# Patient Record
Sex: FEMALE | Race: WHITE | Employment: OTHER | URBAN - METROPOLITAN AREA
[De-identification: names, ages, dates, MRNs, and addresses within clinical notes are randomized per-mention and may not be internally consistent; named-entity substitution may affect disease eponyms.]

---

## 2018-03-26 ENCOUNTER — OFFICE VISIT (OUTPATIENT)
Dept: GERIATRIC MEDICINE | Age: 83
End: 2018-03-26
Payer: COMMERCIAL

## 2018-03-26 VITALS
SYSTOLIC BLOOD PRESSURE: 130 MMHG | TEMPERATURE: 98.2 F | DIASTOLIC BLOOD PRESSURE: 74 MMHG | RESPIRATION RATE: 12 BRPM | BODY MASS INDEX: 31.54 KG/M2 | WEIGHT: 178 LBS | HEIGHT: 63 IN | HEART RATE: 68 BPM

## 2018-03-26 DIAGNOSIS — G31.84 MCI (MILD COGNITIVE IMPAIRMENT): Primary | ICD-10-CM

## 2018-03-26 PROCEDURE — 1036F TOBACCO NON-USER: CPT | Performed by: INTERNAL MEDICINE

## 2018-03-26 PROCEDURE — G8400 PT W/DXA NO RESULTS DOC: HCPCS | Performed by: INTERNAL MEDICINE

## 2018-03-26 PROCEDURE — G8427 DOCREV CUR MEDS BY ELIG CLIN: HCPCS | Performed by: INTERNAL MEDICINE

## 2018-03-26 PROCEDURE — 1090F PRES/ABSN URINE INCON ASSESS: CPT | Performed by: INTERNAL MEDICINE

## 2018-03-26 PROCEDURE — 99211 OFF/OP EST MAY X REQ PHY/QHP: CPT

## 2018-03-26 PROCEDURE — 4040F PNEUMOC VAC/ADMIN/RCVD: CPT | Performed by: INTERNAL MEDICINE

## 2018-03-26 PROCEDURE — 1123F ACP DISCUSS/DSCN MKR DOCD: CPT | Performed by: INTERNAL MEDICINE

## 2018-03-26 PROCEDURE — 99212 OFFICE O/P EST SF 10 MIN: CPT | Performed by: INTERNAL MEDICINE

## 2018-03-26 PROCEDURE — G8417 CALC BMI ABV UP PARAM F/U: HCPCS | Performed by: INTERNAL MEDICINE

## 2018-03-26 PROCEDURE — G8484 FLU IMMUNIZE NO ADMIN: HCPCS | Performed by: INTERNAL MEDICINE

## 2018-03-26 ASSESSMENT — PATIENT HEALTH QUESTIONNAIRE - PHQ9
1. LITTLE INTEREST OR PLEASURE IN DOING THINGS: 0
SUM OF ALL RESPONSES TO PHQ9 QUESTIONS 1 & 2: 1
2. FEELING DOWN, DEPRESSED OR HOPELESS: 1
SUM OF ALL RESPONSES TO PHQ QUESTIONS 1-9: 1

## 2018-03-26 NOTE — PROGRESS NOTES
Chief Complaint   Patient presents with    Follow-up     2nd visit here. Last seen in September re: MCI. Daughter feels pt's memory is about the same.  Discuss Medications     Daughter states pt is concerned about thinning hair & always being cold. Pt adds that her skin is very dry and she is always tired. Pt states she is already on Thyoid medicine. Encouraged her to check with PCP as well.  Immunizations     Per pt / daughter, pt does not take flu or pneumonia vaccines.  Fall     + screening for falls. Has fallen. Water aerobics helps pt, per daughter.

## 2018-03-26 NOTE — PATIENT INSTRUCTIONS
you can lose your balance and fall. · Talk to your doctor if you have numbness in your feet. Preventing falls at home  · Remove raised doorway thresholds, throw rugs, and clutter. Repair loose carpet or raised areas in the floor. · Move furniture and electrical cords to keep them out of walking paths. · Use nonskid floor wax, and wipe up spills right away, especially on ceramic tile floors. · If you use a walker or cane, put rubber tips on it. If you use crutches, clean the bottoms of them regularly with an abrasive pad, such as steel wool. · Keep your house well lit, especially Dontae Pitts, and outside walkways. Use night-lights in areas such as hallways and bathrooms. Add extra light switches or use remote switches (such as switches that go on or off when you clap your hands) to make it easier to turn lights on if you have to get up during the night. · Install sturdy handrails on stairways. · Move items in your cabinets so that the things you use a lot are on the lower shelves (about waist level). · Keep a cordless phone and a flashlight with new batteries by your bed. If possible, put a phone in each of the main rooms of your house, or carry a cell phone in case you fall and cannot reach a phone. Or, you can wear a device around your neck or wrist. You push a button that sends a signal for help. · Wear low-heeled shoes that fit well and give your feet good support. Use footwear with nonskid soles. Check the heels and soles of your shoes for wear. Repair or replace worn heels or soles. · Do not wear socks without shoes on wood floors. · Walk on the grass when the sidewalks are slippery. If you live in an area that gets snow and ice in the winter, sprinkle salt on slippery steps and sidewalks. Preventing falls in the bath  · Install grab bars and nonskid mats inside and outside your shower or tub and near the toilet and sinks. · Use shower chairs and bath benches.   · Use a hand-held shower head

## 2018-09-24 ENCOUNTER — OFFICE VISIT (OUTPATIENT)
Dept: GERIATRIC MEDICINE | Age: 83
End: 2018-09-24
Payer: COMMERCIAL

## 2018-09-24 VITALS
SYSTOLIC BLOOD PRESSURE: 136 MMHG | HEART RATE: 68 BPM | BODY MASS INDEX: 31.1 KG/M2 | RESPIRATION RATE: 16 BRPM | TEMPERATURE: 97.7 F | DIASTOLIC BLOOD PRESSURE: 72 MMHG | HEIGHT: 63 IN | WEIGHT: 175.5 LBS

## 2018-09-24 DIAGNOSIS — G31.84 MCI (MILD COGNITIVE IMPAIRMENT): Primary | ICD-10-CM

## 2018-09-24 PROCEDURE — 99212 OFFICE O/P EST SF 10 MIN: CPT | Performed by: INTERNAL MEDICINE

## 2018-09-24 PROCEDURE — 99211 OFF/OP EST MAY X REQ PHY/QHP: CPT | Performed by: INTERNAL MEDICINE

## 2018-09-24 PROCEDURE — 1101F PT FALLS ASSESS-DOCD LE1/YR: CPT | Performed by: INTERNAL MEDICINE

## 2018-09-24 PROCEDURE — 1123F ACP DISCUSS/DSCN MKR DOCD: CPT | Performed by: INTERNAL MEDICINE

## 2018-09-24 PROCEDURE — 1036F TOBACCO NON-USER: CPT | Performed by: INTERNAL MEDICINE

## 2018-09-24 PROCEDURE — 1090F PRES/ABSN URINE INCON ASSESS: CPT | Performed by: INTERNAL MEDICINE

## 2018-09-24 PROCEDURE — G8427 DOCREV CUR MEDS BY ELIG CLIN: HCPCS | Performed by: INTERNAL MEDICINE

## 2018-09-24 PROCEDURE — G8417 CALC BMI ABV UP PARAM F/U: HCPCS | Performed by: INTERNAL MEDICINE

## 2018-09-24 PROCEDURE — G8400 PT W/DXA NO RESULTS DOC: HCPCS | Performed by: INTERNAL MEDICINE

## 2018-09-24 PROCEDURE — 4040F PNEUMOC VAC/ADMIN/RCVD: CPT | Performed by: INTERNAL MEDICINE

## 2018-10-04 ENCOUNTER — HOSPITAL ENCOUNTER (EMERGENCY)
Age: 83
Discharge: HOME | End: 2018-10-04
Attending: EMERGENCY MEDICINE
Payer: COMMERCIAL

## 2018-10-04 ENCOUNTER — APPOINTMENT (OUTPATIENT)
Dept: GENERAL RADIOLOGY | Age: 83
End: 2018-10-04
Payer: COMMERCIAL

## 2018-10-04 VITALS
BODY MASS INDEX: 31.01 KG/M2 | WEIGHT: 175 LBS | DIASTOLIC BLOOD PRESSURE: 68 MMHG | TEMPERATURE: 98 F | SYSTOLIC BLOOD PRESSURE: 145 MMHG | RESPIRATION RATE: 18 BRPM | HEART RATE: 76 BPM | OXYGEN SATURATION: 98 % | HEIGHT: 63 IN

## 2018-10-04 DIAGNOSIS — S42.211A CLOSED DISPLACED FRACTURE OF SURGICAL NECK OF RIGHT HUMERUS, UNSPECIFIED FRACTURE MORPHOLOGY, INITIAL ENCOUNTER: ICD-10-CM

## 2018-10-04 DIAGNOSIS — S42.90XA CLOSED FRACTURE OF SHOULDER, UNSPECIFIED LATERALITY, INITIAL ENCOUNTER: Primary | ICD-10-CM

## 2018-10-04 PROCEDURE — 73030 X-RAY EXAM OF SHOULDER: CPT

## 2018-10-04 PROCEDURE — 96372 THER/PROPH/DIAG INJ SC/IM: CPT

## 2018-10-04 PROCEDURE — 99284 EMERGENCY DEPT VISIT MOD MDM: CPT

## 2018-10-04 PROCEDURE — 73070 X-RAY EXAM OF ELBOW: CPT

## 2018-10-04 PROCEDURE — 6360000002 HC RX W HCPCS: Performed by: EMERGENCY MEDICINE

## 2018-10-04 PROCEDURE — 6360000002 HC RX W HCPCS

## 2018-10-04 PROCEDURE — 6370000000 HC RX 637 (ALT 250 FOR IP): Performed by: EMERGENCY MEDICINE

## 2018-10-04 RX ORDER — HYDROCODONE BITARTRATE AND ACETAMINOPHEN 5; 325 MG/1; MG/1
1 TABLET ORAL EVERY 6 HOURS PRN
Qty: 10 TABLET | Refills: 0 | Status: SHIPPED | OUTPATIENT
Start: 2018-10-04 | End: 2018-10-07

## 2018-10-04 RX ORDER — DICLOFENAC SODIUM 75 MG/1
75 TABLET, DELAYED RELEASE ORAL 2 TIMES DAILY
Qty: 20 TABLET | Refills: 0 | Status: SHIPPED | OUTPATIENT
Start: 2018-10-04 | End: 2019-03-29

## 2018-10-04 RX ORDER — OXYCODONE HYDROCHLORIDE AND ACETAMINOPHEN 5; 325 MG/1; MG/1
2 TABLET ORAL ONCE
Status: COMPLETED | OUTPATIENT
Start: 2018-10-04 | End: 2018-10-04

## 2018-10-04 RX ADMIN — Medication 1 MG: at 13:25

## 2018-10-04 RX ADMIN — HYDROMORPHONE HYDROCHLORIDE 1 MG: 1 INJECTION, SOLUTION INTRAMUSCULAR; INTRAVENOUS; SUBCUTANEOUS at 13:25

## 2018-10-04 RX ADMIN — HYDROMORPHONE HYDROCHLORIDE 1 MG: 1 INJECTION, SOLUTION INTRAMUSCULAR; INTRAVENOUS; SUBCUTANEOUS at 15:37

## 2018-10-04 RX ADMIN — OXYCODONE HYDROCHLORIDE AND ACETAMINOPHEN 2 TABLET: 5; 325 TABLET ORAL at 16:51

## 2018-10-04 ASSESSMENT — PAIN SCALES - GENERAL
PAINLEVEL_OUTOF10: 8
PAINLEVEL_OUTOF10: 8
PAINLEVEL_OUTOF10: 7
PAINLEVEL_OUTOF10: 6
PAINLEVEL_OUTOF10: 7

## 2018-10-04 ASSESSMENT — PAIN DESCRIPTION - LOCATION
LOCATION: ARM
LOCATION: ELBOW

## 2018-10-04 ASSESSMENT — PAIN DESCRIPTION - ORIENTATION: ORIENTATION: LEFT

## 2018-10-04 ASSESSMENT — PAIN DESCRIPTION - PAIN TYPE: TYPE: ACUTE PAIN

## 2018-10-04 NOTE — ED NOTES
Bed: 02  Expected date:   Expected time:   Means of arrival:   Comments:  FALL     Ludmila Tomlin RN  10/04/18 1090

## 2018-10-04 NOTE — ED PROVIDER NOTES
dislocation.          ------------------------- NURSING NOTES AND VITALS REVIEWED ---------------------------   The nursing notes within the ED encounter and vital signs as below have been reviewed. BP (!) 145/68   Pulse 76   Temp 98 °F (36.7 °C) (Oral)   Resp 18   Ht 5' 3\" (1.6 m)   Wt 175 lb (79.4 kg)   SpO2 98%   BMI 31.00 kg/m²   Oxygen Saturation Interpretation: Normal    ---------------------------------------------------PHYSICAL EXAM--------------------------------------    Constitutional/General: Alert and oriented x3, well appearing, non toxic in NAD. Afebrile. Head: NC/AT  Eyes: PERRL, EOMI  HENT: Oropharynx clear. Handling secretions. Neck: Supple, full ROM  Pulmonary: Lungs clear to auscultation bilaterally, no wheezes, rales, or rhonchi. Not in respiratory distress. Cardiovascular: Regular rate. Regular rhythm. No murmurs. No gallops, or rubs. 2+ distal pulses. Abdomen: Soft, non tender, non distended. No guarding, rebound, or rigidity. Extremities: Moves all extremities x 4. Warm and well perfused. No edema. Skin: Warm and dry without rash. Back: No CVA tenderness. Neurologic: GCS 15, no focal deficits, systemic strength 5/5 to all extremities symmetrically, CN II-XII grossly intact. Psych: Speech and behavior appropriate.     ------------------------------ ED COURSE/MEDICAL DECISION MAKING----------------------  Medications   HYDROmorphone (DILAUDID) injection 1 mg (1 mg Intramuscular Given 10/4/18 1325)   HYDROmorphone (DILAUDID) injection 1 mg (1 mg Intramuscular Given 10/4/18 1537)   oxyCODONE-acetaminophen (PERCOCET) 5-325 MG per tablet 2 tablet (2 tablets Oral Given 10/4/18 3521)            Medical Decision Making:    Pt is an [de-identified]year old female presenting to the ED for mechanical fall, left shoulder injury. No thinners, no head injury, no LOC. She appears in NAD. Imaging ordered. Given Dilaudid. Pt's left humerus is Fx.      Re-evaluations:  Discussed results of imaging with the pt. She is requesting more medications for pain. She will be given another dose of dilaudid and discharged to home. Counseling: The emergency provider has spoken with the patient and daughter and discussed todays results, in addition to providing specific details for the plan of care and counseling regarding the diagnosis and prognosis. Questions are answered at this time and they are agreeable with the plan.      --------------------------------- IMPRESSION AND DISPOSITION ---------------------------------    IMPRESSION  1. Closed fracture of shoulder, unspecified laterality, initial encounter    2. Closed displaced fracture of surgical neck of right humerus, unspecified fracture morphology, initial encounter        DISPOSITION  Disposition: Discharge to home  Patient condition is stable    10/4/18, 12:56 PM.    This note is prepared by Chiquita Ruelas, acting as Scribe for Cj Zaiid MD.    Cj Zaidi MD:  The scribe's documentation has been prepared under my direction and personally reviewed by me in its entirety. I confirm that the note above accurately reflects all work, treatment, procedures, and medical decision making performed by me.              Cj Zaidi MD  11/01/18 7437

## 2018-10-16 ENCOUNTER — HOSPITAL ENCOUNTER (OUTPATIENT)
Age: 83
Discharge: HOME | End: 2018-10-18
Payer: COMMERCIAL

## 2018-10-16 ENCOUNTER — HOSPITAL ENCOUNTER (OUTPATIENT)
Dept: GENERAL RADIOLOGY | Age: 83
Discharge: HOME | End: 2018-10-18
Payer: COMMERCIAL

## 2018-10-16 DIAGNOSIS — S42.222A 2-PART DISP FX OF SURGICAL NECK OF LEFT HUMERUS, INIT: ICD-10-CM

## 2018-10-16 PROCEDURE — 73030 X-RAY EXAM OF SHOULDER: CPT

## 2018-10-30 ENCOUNTER — HOSPITAL ENCOUNTER (OUTPATIENT)
Age: 83
Discharge: HOME | End: 2018-11-01
Payer: MEDICARE

## 2018-10-30 ENCOUNTER — HOSPITAL ENCOUNTER (OUTPATIENT)
Dept: GENERAL RADIOLOGY | Age: 83
Discharge: HOME | End: 2018-11-01
Payer: MEDICARE

## 2018-10-30 DIAGNOSIS — S42.222A 2-PART DISP FX OF SURGICAL NECK OF LEFT HUMERUS, INIT: ICD-10-CM

## 2018-10-30 PROCEDURE — 73030 X-RAY EXAM OF SHOULDER: CPT

## 2018-11-13 ENCOUNTER — HOSPITAL ENCOUNTER (OUTPATIENT)
Dept: GENERAL RADIOLOGY | Age: 83
Discharge: HOME | End: 2018-11-15
Payer: MEDICARE

## 2018-11-13 ENCOUNTER — HOSPITAL ENCOUNTER (OUTPATIENT)
Age: 83
Discharge: HOME | End: 2018-11-15
Payer: MEDICARE

## 2018-11-13 DIAGNOSIS — S42.222A 2-PART DISP FX OF SURGICAL NECK OF LEFT HUMERUS, INIT: ICD-10-CM

## 2018-11-13 PROCEDURE — 73030 X-RAY EXAM OF SHOULDER: CPT

## 2018-12-03 ENCOUNTER — TELEPHONE (OUTPATIENT)
Dept: GERIATRIC MEDICINE | Age: 83
End: 2018-12-03

## 2018-12-04 NOTE — TELEPHONE ENCOUNTER
Spoke with daughter and patient has had exhaustive medical workup  And may have an early UTI . However since yesterday she is remarkably wide awake and alert   And doing well. Suggested to daughter we continue to watch her only at this time.

## 2018-12-11 ENCOUNTER — HOSPITAL ENCOUNTER (OUTPATIENT)
Age: 83
Discharge: HOME | End: 2018-12-13
Payer: MEDICARE

## 2018-12-11 ENCOUNTER — HOSPITAL ENCOUNTER (OUTPATIENT)
Dept: GENERAL RADIOLOGY | Age: 83
Discharge: HOME | End: 2018-12-13
Payer: MEDICARE

## 2018-12-11 DIAGNOSIS — S42.222A 2-PART DISP FX OF SURGICAL NECK OF LEFT HUMERUS, INIT: ICD-10-CM

## 2018-12-11 PROCEDURE — 73030 X-RAY EXAM OF SHOULDER: CPT

## 2019-01-22 ENCOUNTER — HOSPITAL ENCOUNTER (OUTPATIENT)
Dept: GENERAL RADIOLOGY | Age: 84
Discharge: HOME | End: 2019-01-24
Payer: MEDICARE

## 2019-01-22 ENCOUNTER — HOSPITAL ENCOUNTER (OUTPATIENT)
Age: 84
Discharge: HOME | End: 2019-01-24
Payer: MEDICARE

## 2019-01-22 DIAGNOSIS — S42.222A 2-PART DISP FX OF SURGICAL NECK OF LEFT HUMERUS, INIT: ICD-10-CM

## 2019-01-22 PROCEDURE — 73030 X-RAY EXAM OF SHOULDER: CPT

## 2019-03-29 ENCOUNTER — OFFICE VISIT (OUTPATIENT)
Dept: GERIATRIC MEDICINE | Age: 84
End: 2019-03-29
Payer: MEDICARE

## 2019-03-29 VITALS
HEIGHT: 63 IN | TEMPERATURE: 98.1 F | HEART RATE: 68 BPM | WEIGHT: 166.4 LBS | DIASTOLIC BLOOD PRESSURE: 74 MMHG | BODY MASS INDEX: 29.48 KG/M2 | SYSTOLIC BLOOD PRESSURE: 136 MMHG | RESPIRATION RATE: 16 BRPM

## 2019-03-29 DIAGNOSIS — G31.84 MCI (MILD COGNITIVE IMPAIRMENT): Primary | ICD-10-CM

## 2019-03-29 PROCEDURE — 99211 OFF/OP EST MAY X REQ PHY/QHP: CPT | Performed by: INTERNAL MEDICINE

## 2019-03-29 PROCEDURE — G8400 PT W/DXA NO RESULTS DOC: HCPCS | Performed by: INTERNAL MEDICINE

## 2019-03-29 PROCEDURE — 4040F PNEUMOC VAC/ADMIN/RCVD: CPT | Performed by: INTERNAL MEDICINE

## 2019-03-29 PROCEDURE — G8427 DOCREV CUR MEDS BY ELIG CLIN: HCPCS | Performed by: INTERNAL MEDICINE

## 2019-03-29 PROCEDURE — G8417 CALC BMI ABV UP PARAM F/U: HCPCS | Performed by: INTERNAL MEDICINE

## 2019-03-29 PROCEDURE — 1123F ACP DISCUSS/DSCN MKR DOCD: CPT | Performed by: INTERNAL MEDICINE

## 2019-03-29 PROCEDURE — G8484 FLU IMMUNIZE NO ADMIN: HCPCS | Performed by: INTERNAL MEDICINE

## 2019-03-29 PROCEDURE — 1090F PRES/ABSN URINE INCON ASSESS: CPT | Performed by: INTERNAL MEDICINE

## 2019-03-29 PROCEDURE — 1036F TOBACCO NON-USER: CPT | Performed by: INTERNAL MEDICINE

## 2019-03-29 PROCEDURE — 99212 OFFICE O/P EST SF 10 MIN: CPT | Performed by: INTERNAL MEDICINE

## 2019-03-29 ASSESSMENT — PATIENT HEALTH QUESTIONNAIRE - PHQ9
SUM OF ALL RESPONSES TO PHQ QUESTIONS 1-9: 0
2. FEELING DOWN, DEPRESSED OR HOPELESS: 0
SUM OF ALL RESPONSES TO PHQ9 QUESTIONS 1 & 2: 0
SUM OF ALL RESPONSES TO PHQ QUESTIONS 1-9: 0
1. LITTLE INTEREST OR PLEASURE IN DOING THINGS: 0

## 2019-03-29 NOTE — PROGRESS NOTES
Here with Marina Tejeda  And living with Sumaya Holguin   And injured left shoulder and not at home   And NOT [address] at pool in November  And recently back to [address]   She has a whistling behavior and stops with gum  ADL's indepndent but help on standby  IADL's per daughter   Has 6 girls and 2 boys mostly   HYM? 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

## 2019-03-29 NOTE — PROGRESS NOTES
Chief Complaint   Patient presents with    Follow-up     September [facility] follow up.  Hearing Problem     Pt states she has some trouble with hearing. Daughter would like DR's recommendation on who to see for this.  Blood Pressure Check     150/80. Repeat 136/74.  Immunizations     Pt states she does not take flu or pneumonia vaccines.  Fall     History of fall with injury. + for risk factors.

## 2019-03-29 NOTE — PATIENT INSTRUCTIONS
Patient Education        Preventing Falls: Care Instructions  Your Care Instructions    Getting around your home safely can be a challenge if you have injuries or health problems that make it easy for you to fall. Loose rugs and furniture in walkways are among the dangers for many older people who have problems walking or who have poor eyesight. People who have conditions such as arthritis, osteoporosis, or dementia also have to be careful not to fall. You can make your home safer with a few simple measures. Follow-up care is a key part of your treatment and safety. Be sure to make and go to all appointments, and call your doctor if you are having problems. It's also a good idea to know your test results and keep a list of the medicines you take. How can you care for yourself at home? Taking care of yourself  · You may get dizzy if you do not drink enough water. To prevent dehydration, drink plenty of fluids, enough so that your urine is light yellow or clear like water. Choose water and other caffeine-free clear liquids. If you have kidney, heart, or liver disease and have to limit fluids, talk with your doctor before you increase the amount of fluids you drink. · Exercise regularly to improve your strength, muscle tone, and balance. Walk if you can. Swimming may be a good choice if you cannot walk easily. · Have your vision and hearing checked each year or any time you notice a change. If you have trouble seeing and hearing, you might not be able to avoid objects and could lose your balance. · Know the side effects of the medicines you take. Ask your doctor or pharmacist whether the medicines you take can affect your balance. Sleeping pills or sedatives can affect your balance. · Limit the amount of alcohol you drink. Alcohol can impair your balance and other senses. · Ask your doctor whether calluses or corns on your feet need to be removed.  If you wear loose-fitting shoes because of calluses or corns, you can lose your balance and fall. · Talk to your doctor if you have numbness in your feet. Preventing falls at home  · Remove raised doorway thresholds, throw rugs, and clutter. Repair loose carpet or raised areas in the floor. · Move furniture and electrical cords to keep them out of walking paths. · Use nonskid floor wax, and wipe up spills right away, especially on ceramic tile floors. · If you use a walker or cane, put rubber tips on it. If you use crutches, clean the bottoms of them regularly with an abrasive pad, such as steel wool. · Keep your house well lit, especially Donneta Brocks, and outside walkways. Use night-lights in areas such as hallways and bathrooms. Add extra light switches or use remote switches (such as switches that go on or off when you clap your hands) to make it easier to turn lights on if you have to get up during the night. · Install sturdy handrails on stairways. · Move items in your cabinets so that the things you use a lot are on the lower shelves (about waist level). · Keep a cordless phone and a flashlight with new batteries by your bed. If possible, put a phone in each of the main rooms of your house, or carry a cell phone in case you fall and cannot reach a phone. Or, you can wear a device around your neck or wrist. You push a button that sends a signal for help. · Wear low-heeled shoes that fit well and give your feet good support. Use footwear with nonskid soles. Check the heels and soles of your shoes for wear. Repair or replace worn heels or soles. · Do not wear socks without shoes on wood floors. · Walk on the grass when the sidewalks are slippery. If you live in an area that gets snow and ice in the winter, sprinkle salt on slippery steps and sidewalks. Preventing falls in the bath  · Install grab bars and nonskid mats inside and outside your shower or tub and near the toilet and sinks. · Use shower chairs and bath benches.   · Use a hand-held shower head

## 2019-04-01 ENCOUNTER — TELEPHONE (OUTPATIENT)
Dept: GERIATRIC MEDICINE | Age: 84
End: 2019-04-01

## 2019-04-01 DIAGNOSIS — H91.13 PRESBYCUSIS OF BOTH EARS: Primary | ICD-10-CM

## 2019-04-01 NOTE — TELEPHONE ENCOUNTER
LM for daughter to call re: hearing eval.  Will need PCP to make referral d/t insurance. Left message for [facility] to cancel our referral d/t insurance issues.

## 2019-04-02 NOTE — TELEPHONE ENCOUNTER
Left message for NATASHA BOLIVAR [facility] reconfirming cancellation of referral, advised that PCP must order eval, and that daughter is aware. Requested they call back with any questions.

## 2019-05-20 ENCOUNTER — HOSPITAL ENCOUNTER (OUTPATIENT)
Dept: AUDIOLOGY | Age: 84
Discharge: HOME | End: 2019-05-20
Payer: MEDICARE

## 2019-05-20 PROCEDURE — 92557 COMPREHENSIVE HEARING TEST: CPT | Performed by: AUDIOLOGIST

## 2019-05-20 PROCEDURE — 92567 TYMPANOMETRY: CPT | Performed by: AUDIOLOGIST

## 2019-05-20 NOTE — PROGRESS NOTES
AUDIOMETRIC EVALUATION    REASON FOR REFERRAL:  This patient was referred for audiometric testing by Rajeev Joe MD   due to hearing loss. She tried hearing aids a few years ago but did not keep them. She denies tinnitus but notes balance problems, especially when standing up. RESULTS:  Pure tone audiometric testing using earphones was carried out. Results revealed air conduction thresholds averaging 40 dBHL through 2000 Hz, sloping to 80-90 dBHL at 8000 Hz. Speech reception thresholds were obtained at 40 dBHL . Speech discrimination testing was performed at 70-75 dBHL. Obtained were scores of 92% in the left ear and 76% in the right ear. (Un)Masked Bone Conduction Testing revealed no air bone gaps. Tympanometry was administered and revealed normal peak pressure and compliance bilaterally. 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 Speech testing was in agreement with the pure tones, bilaterally. Speech discrimination was excellent in the left ear and good in the right ear. Impedance testing revealed essentially normal middle ear function bilaterally. An ENT consult is suggested if you feel it is clinically warranted due to asymmetrical speech discrimination (right ear worse than left ear). A re-evaluation is recommended annually or if changes are noted. She would like to proceed with ITE hearing aids, with TV and phone adapters. The above results were reviewed with the patient and her daughter. Can call Kevin Pal ([email or web address]) at [phone] to schedule hearing aid fitting when hearing aids are in. If I can be of further assistance or provide additional information, please do not hesitate to contact this office.       Thank you for the referral.      Leanne Alonzo M.A., [address] [facility] [id number]  Electronically signed by Jina Esparza on 5/20/2019 at 2:53 PM

## 2019-10-30 ENCOUNTER — OFFICE VISIT (OUTPATIENT)
Dept: GERIATRIC MEDICINE | Age: 84
End: 2019-10-30
Payer: MEDICARE

## 2019-10-30 VITALS
HEART RATE: 60 BPM | SYSTOLIC BLOOD PRESSURE: 178 MMHG | DIASTOLIC BLOOD PRESSURE: 88 MMHG | RESPIRATION RATE: 16 BRPM | HEIGHT: 64 IN | TEMPERATURE: 98 F | BODY MASS INDEX: 28.61 KG/M2 | WEIGHT: 167.6 LBS

## 2019-10-30 DIAGNOSIS — G31.84 MCI (MILD COGNITIVE IMPAIRMENT): Primary | ICD-10-CM

## 2019-10-30 PROCEDURE — G8417 CALC BMI ABV UP PARAM F/U: HCPCS | Performed by: INTERNAL MEDICINE

## 2019-10-30 PROCEDURE — 99212 OFFICE O/P EST SF 10 MIN: CPT | Performed by: INTERNAL MEDICINE

## 2019-10-30 PROCEDURE — 1090F PRES/ABSN URINE INCON ASSESS: CPT | Performed by: INTERNAL MEDICINE

## 2019-10-30 PROCEDURE — G8400 PT W/DXA NO RESULTS DOC: HCPCS | Performed by: INTERNAL MEDICINE

## 2019-10-30 PROCEDURE — 1036F TOBACCO NON-USER: CPT | Performed by: INTERNAL MEDICINE

## 2019-10-30 PROCEDURE — 1123F ACP DISCUSS/DSCN MKR DOCD: CPT | Performed by: INTERNAL MEDICINE

## 2019-10-30 PROCEDURE — G8427 DOCREV CUR MEDS BY ELIG CLIN: HCPCS | Performed by: INTERNAL MEDICINE

## 2019-10-30 PROCEDURE — 4040F PNEUMOC VAC/ADMIN/RCVD: CPT | Performed by: INTERNAL MEDICINE

## 2019-10-30 PROCEDURE — G8484 FLU IMMUNIZE NO ADMIN: HCPCS | Performed by: INTERNAL MEDICINE

## 2020-01-01 ENCOUNTER — OFFICE VISIT (OUTPATIENT)
Dept: GERIATRIC MEDICINE | Age: 85
End: 2020-01-01
Payer: MEDICARE

## 2020-01-01 VITALS
DIASTOLIC BLOOD PRESSURE: 82 MMHG | RESPIRATION RATE: 22 BRPM | WEIGHT: 164.5 LBS | SYSTOLIC BLOOD PRESSURE: 158 MMHG | TEMPERATURE: 97.5 F | HEIGHT: 63 IN | BODY MASS INDEX: 29.15 KG/M2 | HEART RATE: 64 BPM

## 2020-01-01 PROCEDURE — G8400 PT W/DXA NO RESULTS DOC: HCPCS | Performed by: INTERNAL MEDICINE

## 2020-01-01 PROCEDURE — 1036F TOBACCO NON-USER: CPT | Performed by: INTERNAL MEDICINE

## 2020-01-01 PROCEDURE — 99212 OFFICE O/P EST SF 10 MIN: CPT | Performed by: INTERNAL MEDICINE

## 2020-01-01 PROCEDURE — 1090F PRES/ABSN URINE INCON ASSESS: CPT | Performed by: INTERNAL MEDICINE

## 2020-01-01 PROCEDURE — 1123F ACP DISCUSS/DSCN MKR DOCD: CPT | Performed by: INTERNAL MEDICINE

## 2020-01-01 PROCEDURE — 4040F PNEUMOC VAC/ADMIN/RCVD: CPT | Performed by: INTERNAL MEDICINE

## 2020-01-01 PROCEDURE — G8427 DOCREV CUR MEDS BY ELIG CLIN: HCPCS | Performed by: INTERNAL MEDICINE

## 2020-01-01 PROCEDURE — G8484 FLU IMMUNIZE NO ADMIN: HCPCS | Performed by: INTERNAL MEDICINE

## 2020-01-01 PROCEDURE — G8417 CALC BMI ABV UP PARAM F/U: HCPCS | Performed by: INTERNAL MEDICINE

## 2020-01-01 RX ORDER — MEMANTINE HYDROCHLORIDE 5 MG/1
5 TABLET ORAL DAILY
COMMUNITY

## 2020-01-01 RX ORDER — MEMANTINE HYDROCHLORIDE 10 MG/1
10 TABLET ORAL DAILY
COMMUNITY

## 2020-01-01 RX ORDER — UBIDECARENONE 100 MG
2 CAPSULE ORAL DAILY
COMMUNITY

## 2020-01-01 RX ORDER — CHLORPHENIR/PHENYLEPH/ASPIRIN 2-7.8-325
1 TABLET, EFFERVESCENT ORAL DAILY
COMMUNITY

## 2020-01-01 RX ORDER — DONEPEZIL HYDROCHLORIDE 5 MG/1
5 TABLET, FILM COATED ORAL NIGHTLY
COMMUNITY

## 2020-01-01 RX ORDER — CHOLECALCIFEROL (VITAMIN D3) 125 MCG
5 CAPSULE ORAL NIGHTLY
COMMUNITY

## 2020-01-01 ASSESSMENT — PATIENT HEALTH QUESTIONNAIRE - PHQ9
SUM OF ALL RESPONSES TO PHQ QUESTIONS 1-9: 0
1. LITTLE INTEREST OR PLEASURE IN DOING THINGS: 0
SUM OF ALL RESPONSES TO PHQ9 QUESTIONS 1 & 2: 0
2. FEELING DOWN, DEPRESSED OR HOPELESS: 0
SUM OF ALL RESPONSES TO PHQ QUESTIONS 1-9: 0
SUM OF ALL RESPONSES TO PHQ QUESTIONS 1-9: 0

## 2020-01-17 ENCOUNTER — TELEPHONE (OUTPATIENT)
Dept: GERIATRIC MEDICINE | Age: 85
End: 2020-01-17

## 2020-01-21 NOTE — TELEPHONE ENCOUNTER
I spoke with daughter and it seems she had altered mental state with a URI now clearing. Will be spending the next week one on one with the other daughter. Recommend no changes at this time. They will be attending the [address] [place] [address] next week   And I feel this is a great option.

## 2020-04-29 ENCOUNTER — VIRTUAL VISIT (OUTPATIENT)
Dept: GERIATRIC MEDICINE | Age: 85
End: 2020-04-29
Payer: MEDICARE

## 2020-04-29 VITALS — WEIGHT: 165 LBS | BODY MASS INDEX: 29.23 KG/M2 | HEIGHT: 63 IN

## 2020-04-29 RX ORDER — NITROFURANTOIN MACROCRYSTALS 25 MG/1
25 CAPSULE ORAL DAILY
COMMUNITY

## 2020-04-29 RX ORDER — MEMANTINE HYDROCHLORIDE 10 MG/1
TABLET ORAL
Qty: 30 TABLET | Refills: 5 | Status: SHIPPED
Start: 2020-04-29 | End: 2020-01-01

## 2020-06-17 ENCOUNTER — APPOINTMENT (OUTPATIENT)
Dept: GENERAL RADIOLOGY | Age: 85
DRG: 482 | End: 2020-06-17
Payer: MEDICARE

## 2020-06-17 ENCOUNTER — APPOINTMENT (OUTPATIENT)
Dept: CT IMAGING | Age: 85
DRG: 482 | End: 2020-06-17
Payer: MEDICARE

## 2020-06-17 ENCOUNTER — HOSPITAL ENCOUNTER (INPATIENT)
Age: 85
LOS: 5 days | Discharge: HOME HEALTH | DRG: 482 | End: 2020-06-22
Attending: EMERGENCY MEDICINE | Admitting: FAMILY MEDICINE
Payer: MEDICARE

## 2020-06-17 ENCOUNTER — ANESTHESIA EVENT (OUTPATIENT)
Dept: OPERATING ROOM | Age: 85
DRG: 482 | End: 2020-06-17
Payer: MEDICARE

## 2020-06-17 PROBLEM — S72.142A INTERTROCHANTERIC FRACTURE OF LEFT HIP, CLOSED, INITIAL ENCOUNTER (HCC): Status: ACTIVE | Noted: 2020-06-17

## 2020-06-17 LAB
ANION GAP SERPL CALCULATED.3IONS-SCNC: 8 MMOL/L (ref 7–16)
BASOPHILS ABSOLUTE: 0.02 E9/L (ref 0–0.2)
BASOPHILS RELATIVE PERCENT: 0.3 % (ref 0–2)
BUN BLDV-MCNC: 22 MG/DL (ref 8–23)
CALCIUM SERPL-MCNC: 9.4 MG/DL (ref 8.6–10.2)
CHLORIDE BLD-SCNC: 102 MMOL/L (ref 98–107)
CO2: 27 MMOL/L (ref 22–29)
CREAT SERPL-MCNC: 1.2 MG/DL (ref 0.5–1)
EOSINOPHILS ABSOLUTE: 0.17 E9/L (ref 0.05–0.5)
EOSINOPHILS RELATIVE PERCENT: 2.7 % (ref 0–6)
GFR AFRICAN AMERICAN: 52
GFR NON-AFRICAN AMERICAN: 43 ML/MIN/1.73
GLUCOSE BLD-MCNC: 154 MG/DL (ref 74–99)
HCT VFR BLD CALC: 35.2 % (ref 34–48)
HEMOGLOBIN: 11.3 G/DL (ref 11.5–15.5)
IMMATURE GRANULOCYTES #: 0.04 E9/L
IMMATURE GRANULOCYTES %: 0.6 % (ref 0–5)
LYMPHOCYTES ABSOLUTE: 0.9 E9/L (ref 1.5–4)
LYMPHOCYTES RELATIVE PERCENT: 14.4 % (ref 20–42)
MCH RBC QN AUTO: 29.2 PG (ref 26–35)
MCHC RBC AUTO-ENTMCNC: 32.1 % (ref 32–34.5)
MCV RBC AUTO: 91 FL (ref 80–99.9)
METER GLUCOSE: 119 MG/DL (ref 74–99)
METER GLUCOSE: 187 MG/DL (ref 74–99)
MONOCYTES ABSOLUTE: 0.46 E9/L (ref 0.1–0.95)
MONOCYTES RELATIVE PERCENT: 7.3 % (ref 2–12)
NEUTROPHILS ABSOLUTE: 4.67 E9/L (ref 1.8–7.3)
NEUTROPHILS RELATIVE PERCENT: 74.7 % (ref 43–80)
PDW BLD-RTO: 14.4 FL (ref 11.5–15)
PLATELET # BLD: 189 E9/L (ref 130–450)
PMV BLD AUTO: 10.8 FL (ref 7–12)
POTASSIUM REFLEX MAGNESIUM: 4.3 MMOL/L (ref 3.5–5)
RBC # BLD: 3.87 E12/L (ref 3.5–5.5)
SODIUM BLD-SCNC: 137 MMOL/L (ref 132–146)
TROPONIN: <0.01 NG/ML (ref 0–0.03)
WBC # BLD: 6.3 E9/L (ref 4.5–11.5)

## 2020-06-17 PROCEDURE — 85025 COMPLETE CBC W/AUTO DIFF WBC: CPT

## 2020-06-17 PROCEDURE — 80048 BASIC METABOLIC PNL TOTAL CA: CPT

## 2020-06-17 PROCEDURE — 73562 X-RAY EXAM OF KNEE 3: CPT

## 2020-06-17 PROCEDURE — 6360000002 HC RX W HCPCS: Performed by: EMERGENCY MEDICINE

## 2020-06-17 PROCEDURE — 99285 EMERGENCY DEPT VISIT HI MDM: CPT

## 2020-06-17 PROCEDURE — 93005 ELECTROCARDIOGRAM TRACING: CPT | Performed by: EMERGENCY MEDICINE

## 2020-06-17 PROCEDURE — 72125 CT NECK SPINE W/O DYE: CPT

## 2020-06-17 PROCEDURE — 96374 THER/PROPH/DIAG INJ IV PUSH: CPT

## 2020-06-17 PROCEDURE — 6360000002 HC RX W HCPCS: Performed by: ORTHOPAEDIC SURGERY

## 2020-06-17 PROCEDURE — 82962 GLUCOSE BLOOD TEST: CPT

## 2020-06-17 PROCEDURE — 1200000000 HC SEMI PRIVATE

## 2020-06-17 PROCEDURE — 71045 X-RAY EXAM CHEST 1 VIEW: CPT

## 2020-06-17 PROCEDURE — 73502 X-RAY EXAM HIP UNI 2-3 VIEWS: CPT

## 2020-06-17 PROCEDURE — 2580000003 HC RX 258: Performed by: ORTHOPAEDIC SURGERY

## 2020-06-17 PROCEDURE — 96375 TX/PRO/DX INJ NEW DRUG ADDON: CPT

## 2020-06-17 PROCEDURE — 84484 ASSAY OF TROPONIN QUANT: CPT

## 2020-06-17 PROCEDURE — 70450 CT HEAD/BRAIN W/O DYE: CPT

## 2020-06-17 PROCEDURE — 6370000000 HC RX 637 (ALT 250 FOR IP): Performed by: FAMILY MEDICINE

## 2020-06-17 PROCEDURE — 6370000000 HC RX 637 (ALT 250 FOR IP): Performed by: ORTHOPAEDIC SURGERY

## 2020-06-17 RX ORDER — ACETAMINOPHEN 650 MG/1
650 SUPPOSITORY RECTAL EVERY 6 HOURS PRN
Status: DISCONTINUED | OUTPATIENT
Start: 2020-06-17 | End: 2020-06-22 | Stop reason: HOSPADM

## 2020-06-17 RX ORDER — DONEPEZIL HYDROCHLORIDE 5 MG/1
10 TABLET, FILM COATED ORAL NIGHTLY
Status: DISCONTINUED | OUTPATIENT
Start: 2020-06-17 | End: 2020-06-22 | Stop reason: HOSPADM

## 2020-06-17 RX ORDER — FENTANYL CITRATE 50 UG/ML
50 INJECTION, SOLUTION INTRAMUSCULAR; INTRAVENOUS ONCE
Status: COMPLETED | OUTPATIENT
Start: 2020-06-17 | End: 2020-06-17

## 2020-06-17 RX ORDER — DOCUSATE SODIUM 100 MG/1
100 CAPSULE, LIQUID FILLED ORAL DAILY
Status: DISCONTINUED | OUTPATIENT
Start: 2020-06-17 | End: 2020-06-22 | Stop reason: HOSPADM

## 2020-06-17 RX ORDER — FENOFIBRATE 160 MG/1
160 TABLET ORAL DAILY
Status: DISCONTINUED | OUTPATIENT
Start: 2020-06-17 | End: 2020-06-22 | Stop reason: HOSPADM

## 2020-06-17 RX ORDER — MORPHINE SULFATE 4 MG/ML
4 INJECTION, SOLUTION INTRAMUSCULAR; INTRAVENOUS ONCE
Status: COMPLETED | OUTPATIENT
Start: 2020-06-17 | End: 2020-06-17

## 2020-06-17 RX ORDER — SODIUM CHLORIDE 0.9 % (FLUSH) 0.9 %
10 SYRINGE (ML) INJECTION PRN
Status: DISCONTINUED | OUTPATIENT
Start: 2020-06-17 | End: 2020-06-22 | Stop reason: HOSPADM

## 2020-06-17 RX ORDER — PROMETHAZINE HYDROCHLORIDE 25 MG/1
12.5 TABLET ORAL EVERY 6 HOURS PRN
Status: DISCONTINUED | OUTPATIENT
Start: 2020-06-17 | End: 2020-06-22 | Stop reason: HOSPADM

## 2020-06-17 RX ORDER — CHOLECALCIFEROL (VITAMIN D3) 50 MCG
2000 TABLET ORAL DAILY
Status: DISCONTINUED | OUTPATIENT
Start: 2020-06-17 | End: 2020-06-18

## 2020-06-17 RX ORDER — ONDANSETRON 2 MG/ML
4 INJECTION INTRAMUSCULAR; INTRAVENOUS EVERY 6 HOURS PRN
Status: DISCONTINUED | OUTPATIENT
Start: 2020-06-17 | End: 2020-06-22 | Stop reason: HOSPADM

## 2020-06-17 RX ORDER — ACETAMINOPHEN 325 MG/1
650 TABLET ORAL EVERY 6 HOURS PRN
Status: DISCONTINUED | OUTPATIENT
Start: 2020-06-17 | End: 2020-06-22 | Stop reason: HOSPADM

## 2020-06-17 RX ORDER — HYDROCODONE BITARTRATE AND ACETAMINOPHEN 5; 325 MG/1; MG/1
1 TABLET ORAL EVERY 6 HOURS PRN
Status: DISCONTINUED | OUTPATIENT
Start: 2020-06-17 | End: 2020-06-22 | Stop reason: HOSPADM

## 2020-06-17 RX ORDER — NICOTINE POLACRILEX 4 MG
15 LOZENGE BUCCAL PRN
Status: DISCONTINUED | OUTPATIENT
Start: 2020-06-17 | End: 2020-06-22 | Stop reason: HOSPADM

## 2020-06-17 RX ORDER — MEMANTINE HYDROCHLORIDE 10 MG/1
10 TABLET ORAL DAILY
Status: DISCONTINUED | OUTPATIENT
Start: 2020-06-17 | End: 2020-06-22 | Stop reason: HOSPADM

## 2020-06-17 RX ORDER — SODIUM CHLORIDE 0.9 % (FLUSH) 0.9 %
10 SYRINGE (ML) INJECTION EVERY 12 HOURS SCHEDULED
Status: DISCONTINUED | OUTPATIENT
Start: 2020-06-17 | End: 2020-06-22 | Stop reason: HOSPADM

## 2020-06-17 RX ORDER — MORPHINE SULFATE 2 MG/ML
1 INJECTION, SOLUTION INTRAMUSCULAR; INTRAVENOUS
Status: DISCONTINUED | OUTPATIENT
Start: 2020-06-17 | End: 2020-06-22 | Stop reason: HOSPADM

## 2020-06-17 RX ORDER — DEXTROSE MONOHYDRATE 25 G/50ML
12.5 INJECTION, SOLUTION INTRAVENOUS PRN
Status: DISCONTINUED | OUTPATIENT
Start: 2020-06-17 | End: 2020-06-22 | Stop reason: HOSPADM

## 2020-06-17 RX ORDER — LEVOTHYROXINE SODIUM 0.07 MG/1
75 TABLET ORAL DAILY
Status: DISCONTINUED | OUTPATIENT
Start: 2020-06-17 | End: 2020-06-22 | Stop reason: HOSPADM

## 2020-06-17 RX ORDER — HYDROCODONE BITARTRATE AND ACETAMINOPHEN 5; 325 MG/1; MG/1
0.5 TABLET ORAL EVERY 6 HOURS PRN
Status: DISCONTINUED | OUTPATIENT
Start: 2020-06-17 | End: 2020-06-22 | Stop reason: HOSPADM

## 2020-06-17 RX ORDER — MORPHINE SULFATE 2 MG/ML
2 INJECTION, SOLUTION INTRAMUSCULAR; INTRAVENOUS
Status: DISCONTINUED | OUTPATIENT
Start: 2020-06-17 | End: 2020-06-22 | Stop reason: HOSPADM

## 2020-06-17 RX ORDER — POLYETHYLENE GLYCOL 3350 17 G/17G
17 POWDER, FOR SOLUTION ORAL DAILY PRN
Status: DISCONTINUED | OUTPATIENT
Start: 2020-06-17 | End: 2020-06-22 | Stop reason: HOSPADM

## 2020-06-17 RX ORDER — DEXTROSE MONOHYDRATE 50 MG/ML
100 INJECTION, SOLUTION INTRAVENOUS PRN
Status: DISCONTINUED | OUTPATIENT
Start: 2020-06-17 | End: 2020-06-22 | Stop reason: HOSPADM

## 2020-06-17 RX ORDER — SODIUM CHLORIDE 450 MG/100ML
INJECTION, SOLUTION INTRAVENOUS CONTINUOUS
Status: DISCONTINUED | OUTPATIENT
Start: 2020-06-17 | End: 2020-06-20

## 2020-06-17 RX ADMIN — MORPHINE SULFATE 4 MG: 4 INJECTION, SOLUTION INTRAMUSCULAR; INTRAVENOUS at 14:50

## 2020-06-17 RX ADMIN — MORPHINE SULFATE 1 MG: 2 INJECTION, SOLUTION INTRAMUSCULAR; INTRAVENOUS at 23:12

## 2020-06-17 RX ADMIN — HYDROCODONE BITARTRATE AND ACETAMINOPHEN 0.5 TABLET: 5; 325 TABLET ORAL at 19:00

## 2020-06-17 RX ADMIN — DONEPEZIL HYDROCHLORIDE 10 MG: 5 TABLET, FILM COATED ORAL at 21:21

## 2020-06-17 RX ADMIN — METFORMIN HYDROCHLORIDE 1000 MG: 1000 TABLET ORAL at 21:20

## 2020-06-17 RX ADMIN — MEMANTINE HYDROCHLORIDE 10 MG: 10 TABLET, FILM COATED ORAL at 21:20

## 2020-06-17 RX ADMIN — CHOLECALCIFEROL TAB 50 MCG (2000 UNIT) 2000 UNITS: 50 TAB at 23:12

## 2020-06-17 RX ADMIN — DOCUSATE SODIUM 100 MG: 100 CAPSULE, LIQUID FILLED ORAL at 21:21

## 2020-06-17 RX ADMIN — SODIUM CHLORIDE: 4.5 INJECTION, SOLUTION INTRAVENOUS at 23:17

## 2020-06-17 RX ADMIN — FENTANYL CITRATE 50 MCG: 50 INJECTION INTRAMUSCULAR; INTRAVENOUS at 13:22

## 2020-06-17 ASSESSMENT — PAIN DESCRIPTION - DESCRIPTORS
DESCRIPTORS: ACHING;DISCOMFORT;CONSTANT
DESCRIPTORS: ACHING;DISCOMFORT;SORE
DESCRIPTORS: ACHING;DISCOMFORT;SORE

## 2020-06-17 ASSESSMENT — PAIN DESCRIPTION - ORIENTATION
ORIENTATION: LEFT

## 2020-06-17 ASSESSMENT — PAIN DESCRIPTION - LOCATION
LOCATION: HIP

## 2020-06-17 ASSESSMENT — PAIN DESCRIPTION - PROGRESSION
CLINICAL_PROGRESSION: GRADUALLY WORSENING

## 2020-06-17 ASSESSMENT — PAIN DESCRIPTION - PAIN TYPE
TYPE: ACUTE PAIN

## 2020-06-17 ASSESSMENT — PAIN DESCRIPTION - ONSET
ONSET: ON-GOING

## 2020-06-17 ASSESSMENT — PAIN SCALES - GENERAL
PAINLEVEL_OUTOF10: 9
PAINLEVEL_OUTOF10: 10
PAINLEVEL_OUTOF10: 0
PAINLEVEL_OUTOF10: 10
PAINLEVEL_OUTOF10: 6
PAINLEVEL_OUTOF10: 6
PAINLEVEL_OUTOF10: 10
PAINLEVEL_OUTOF10: 5
PAINLEVEL_OUTOF10: 0
PAINLEVEL_OUTOF10: 0

## 2020-06-17 ASSESSMENT — PAIN DESCRIPTION - FREQUENCY
FREQUENCY: CONTINUOUS

## 2020-06-17 ASSESSMENT — PAIN - FUNCTIONAL ASSESSMENT
PAIN_FUNCTIONAL_ASSESSMENT: PREVENTS OR INTERFERES SOME ACTIVE ACTIVITIES AND ADLS

## 2020-06-17 NOTE — ED NOTES
Bed: 16  Expected date:   Expected time:   Means of arrival:   Comments:  fatuma Sandhu RN  06/17/20 8688

## 2020-06-17 NOTE — ED PROVIDER NOTES
HPI:  6/17/20,   Time: 1:08 PM EDT         Ainsley Rivera is a 82 y.o. female presenting to the ED for a fall after a chair twisted unexpectedly with associated injury to the left hip as well as a fractured tooth and pain in her knee and her neck, beginning a short time ago. The complaint has been persistent, moderate in severity, and worsened by changing position. No prior dizziness palpitations. Patient denies back pain, abdominal pain, nausea, vomiting and chest pain    ROS:   Pertinent positives and negatives are stated within HPI, all other systems reviewed and are negative.  --------------------------------------------- PAST HISTORY ---------------------------------------------  Past Medical History:  has a past medical history of Diabetes ([facility] [address] and Thyroid disease. Past Surgical History:  has no past surgical history on file. Social History:  reports that she quit smoking about 63 years ago. She started smoking about 64 years ago. She has a 0.30 pack-year smoking history. She has never used smokeless tobacco. She reports current alcohol use. She reports that she does not use drugs. Family History: family history is not on file. The patients home medications have been reviewed.     Allergies: Seasonal    -------------------------------------------------- RESULTS -------------------------------------------------  All laboratory and radiology results have been personally reviewed by myself   LABS:  Results for orders placed or performed during the hospital encounter of 06/17/20   CBC Auto Differential   Result Value Ref Range    WBC 6.3 4.5 - 11.5 E9/L    RBC 3.87 3.50 - 5.50 E12/L    Hemoglobin 11.3 (L) 11.5 - 15.5 g/dL    Hematocrit 35.2 34.0 - 48.0 %    MCV 91.0 80.0 - 99.9 fL    MCH 29.2 26.0 - 35.0 pg    MCHC 32.1 32.0 - 34.5 %    RDW 14.4 11.5 - 15.0 fL    Platelets 929 849 - 502 E9/L    MPV 10.8 7.0 - 12.0 fL    Neutrophils % 74.7 43.0 - 80.0 %    Immature Granulocytes % 0.6 0.0 - 5.0 %

## 2020-06-18 ENCOUNTER — ANESTHESIA (OUTPATIENT)
Dept: OPERATING ROOM | Age: 85
DRG: 482 | End: 2020-06-18
Payer: MEDICARE

## 2020-06-18 ENCOUNTER — APPOINTMENT (OUTPATIENT)
Dept: GENERAL RADIOLOGY | Age: 85
DRG: 482 | End: 2020-06-18
Payer: MEDICARE

## 2020-06-18 VITALS — SYSTOLIC BLOOD PRESSURE: 102 MMHG | DIASTOLIC BLOOD PRESSURE: 47 MMHG | OXYGEN SATURATION: 99 % | TEMPERATURE: 95.7 F

## 2020-06-18 LAB
ABO/RH: NORMAL
ALBUMIN SERPL-MCNC: 3.4 G/DL (ref 3.5–5.2)
ALP BLD-CCNC: 68 U/L (ref 35–104)
ALT SERPL-CCNC: 18 U/L (ref 0–32)
ANION GAP SERPL CALCULATED.3IONS-SCNC: 10 MMOL/L (ref 7–16)
ANTIBODY SCREEN: NORMAL
AST SERPL-CCNC: 29 U/L (ref 0–31)
BASOPHILS ABSOLUTE: 0.02 E9/L (ref 0–0.2)
BASOPHILS RELATIVE PERCENT: 0.3 % (ref 0–2)
BILIRUB SERPL-MCNC: 0.5 MG/DL (ref 0–1.2)
BUN BLDV-MCNC: 25 MG/DL (ref 8–23)
CALCIUM SERPL-MCNC: 9 MG/DL (ref 8.6–10.2)
CHLORIDE BLD-SCNC: 103 MMOL/L (ref 98–107)
CO2: 22 MMOL/L (ref 22–29)
CREAT SERPL-MCNC: 1.2 MG/DL (ref 0.5–1)
EKG ATRIAL RATE: 65 BPM
EKG P AXIS: 25 DEGREES
EKG P-R INTERVAL: 168 MS
EKG Q-T INTERVAL: 400 MS
EKG QRS DURATION: 90 MS
EKG QTC CALCULATION (BAZETT): 416 MS
EKG R AXIS: -10 DEGREES
EKG T AXIS: 40 DEGREES
EKG VENTRICULAR RATE: 65 BPM
EOSINOPHILS ABSOLUTE: 0.04 E9/L (ref 0.05–0.5)
EOSINOPHILS RELATIVE PERCENT: 0.7 % (ref 0–6)
GFR AFRICAN AMERICAN: 52
GFR NON-AFRICAN AMERICAN: 43 ML/MIN/1.73
GLUCOSE BLD-MCNC: 120 MG/DL (ref 74–99)
HCT VFR BLD CALC: 32.2 % (ref 34–48)
HEMOGLOBIN: 10.1 G/DL (ref 11.5–15.5)
IMMATURE GRANULOCYTES #: 0.02 E9/L
IMMATURE GRANULOCYTES %: 0.3 % (ref 0–5)
LYMPHOCYTES ABSOLUTE: 0.99 E9/L (ref 1.5–4)
LYMPHOCYTES RELATIVE PERCENT: 16.2 % (ref 20–42)
MCH RBC QN AUTO: 28.9 PG (ref 26–35)
MCHC RBC AUTO-ENTMCNC: 31.4 % (ref 32–34.5)
MCV RBC AUTO: 92 FL (ref 80–99.9)
METER GLUCOSE: 106 MG/DL (ref 74–99)
METER GLUCOSE: 114 MG/DL (ref 74–99)
METER GLUCOSE: 186 MG/DL (ref 74–99)
METER GLUCOSE: 99 MG/DL (ref 74–99)
MONOCYTES ABSOLUTE: 0.55 E9/L (ref 0.1–0.95)
MONOCYTES RELATIVE PERCENT: 9 % (ref 2–12)
NEUTROPHILS ABSOLUTE: 4.48 E9/L (ref 1.8–7.3)
NEUTROPHILS RELATIVE PERCENT: 73.5 % (ref 43–80)
PDW BLD-RTO: 14.5 FL (ref 11.5–15)
PLATELET # BLD: 159 E9/L (ref 130–450)
PMV BLD AUTO: 10.8 FL (ref 7–12)
POTASSIUM REFLEX MAGNESIUM: 4.3 MMOL/L (ref 3.5–5)
RBC # BLD: 3.5 E12/L (ref 3.5–5.5)
SODIUM BLD-SCNC: 135 MMOL/L (ref 132–146)
TOTAL PROTEIN: 6.5 G/DL (ref 6.4–8.3)
VITAMIN D 25-HYDROXY: 37 NG/ML (ref 30–100)
WBC # BLD: 6.1 E9/L (ref 4.5–11.5)

## 2020-06-18 PROCEDURE — 6360000002 HC RX W HCPCS: Performed by: ORTHOPAEDIC SURGERY

## 2020-06-18 PROCEDURE — 2580000003 HC RX 258: Performed by: NURSE ANESTHETIST, CERTIFIED REGISTERED

## 2020-06-18 PROCEDURE — 7100000001 HC PACU RECOVERY - ADDTL 15 MIN: Performed by: ORTHOPAEDIC SURGERY

## 2020-06-18 PROCEDURE — 86901 BLOOD TYPING SEROLOGIC RH(D): CPT

## 2020-06-18 PROCEDURE — 85025 COMPLETE CBC W/AUTO DIFF WBC: CPT

## 2020-06-18 PROCEDURE — 2720000010 HC SURG SUPPLY STERILE: Performed by: ORTHOPAEDIC SURGERY

## 2020-06-18 PROCEDURE — 2580000003 HC RX 258: Performed by: ORTHOPAEDIC SURGERY

## 2020-06-18 PROCEDURE — 6370000000 HC RX 637 (ALT 250 FOR IP): Performed by: ORTHOPAEDIC SURGERY

## 2020-06-18 PROCEDURE — 86900 BLOOD TYPING SEROLOGIC ABO: CPT

## 2020-06-18 PROCEDURE — 82306 VITAMIN D 25 HYDROXY: CPT

## 2020-06-18 PROCEDURE — 2709999900 HC NON-CHARGEABLE SUPPLY: Performed by: ORTHOPAEDIC SURGERY

## 2020-06-18 PROCEDURE — 6360000002 HC RX W HCPCS: Performed by: NURSE ANESTHETIST, CERTIFIED REGISTERED

## 2020-06-18 PROCEDURE — 2500000003 HC RX 250 WO HCPCS: Performed by: NURSE ANESTHETIST, CERTIFIED REGISTERED

## 2020-06-18 PROCEDURE — 6370000000 HC RX 637 (ALT 250 FOR IP): Performed by: FAMILY MEDICINE

## 2020-06-18 PROCEDURE — 3600000014 HC SURGERY LEVEL 4 ADDTL 15MIN: Performed by: ORTHOPAEDIC SURGERY

## 2020-06-18 PROCEDURE — 86850 RBC ANTIBODY SCREEN: CPT

## 2020-06-18 PROCEDURE — 7100000000 HC PACU RECOVERY - FIRST 15 MIN: Performed by: ORTHOPAEDIC SURGERY

## 2020-06-18 PROCEDURE — 3600000004 HC SURGERY LEVEL 4 BASE: Performed by: ORTHOPAEDIC SURGERY

## 2020-06-18 PROCEDURE — 3700000001 HC ADD 15 MINUTES (ANESTHESIA): Performed by: ORTHOPAEDIC SURGERY

## 2020-06-18 PROCEDURE — 0QS706Z REPOSITION LEFT UPPER FEMUR WITH INTRAMEDULLARY INTERNAL FIXATION DEVICE, OPEN APPROACH: ICD-10-PCS | Performed by: ORTHOPAEDIC SURGERY

## 2020-06-18 PROCEDURE — 1200000000 HC SEMI PRIVATE

## 2020-06-18 PROCEDURE — 3700000000 HC ANESTHESIA ATTENDED CARE: Performed by: ORTHOPAEDIC SURGERY

## 2020-06-18 PROCEDURE — C1769 GUIDE WIRE: HCPCS | Performed by: ORTHOPAEDIC SURGERY

## 2020-06-18 PROCEDURE — 36415 COLL VENOUS BLD VENIPUNCTURE: CPT

## 2020-06-18 PROCEDURE — 82962 GLUCOSE BLOOD TEST: CPT

## 2020-06-18 PROCEDURE — C1713 ANCHOR/SCREW BN/BN,TIS/BN: HCPCS | Performed by: ORTHOPAEDIC SURGERY

## 2020-06-18 PROCEDURE — 80053 COMPREHEN METABOLIC PANEL: CPT

## 2020-06-18 PROCEDURE — C1776 JOINT DEVICE (IMPLANTABLE): HCPCS | Performed by: ORTHOPAEDIC SURGERY

## 2020-06-18 PROCEDURE — 3209999900 FLUORO FOR SURGICAL PROCEDURES

## 2020-06-18 PROCEDURE — 93010 ELECTROCARDIOGRAM REPORT: CPT | Performed by: INTERNAL MEDICINE

## 2020-06-18 PROCEDURE — 73502 X-RAY EXAM HIP UNI 2-3 VIEWS: CPT

## 2020-06-18 PROCEDURE — 2500000003 HC RX 250 WO HCPCS: Performed by: ORTHOPAEDIC SURGERY

## 2020-06-18 DEVICE — IMPLANTABLE DEVICE: Type: IMPLANTABLE DEVICE | Site: HIP | Status: FUNCTIONAL

## 2020-06-18 DEVICE — SCREW BNE L36MM DIA5MM TIB LT GRN TI ST CANN LOK FULL THRD: Type: IMPLANTABLE DEVICE | Site: HIP | Status: FUNCTIONAL

## 2020-06-18 DEVICE — SCREW BNE L95MM DIA10.35MM PROX FEM G TI CANN FOR TFN ADV: Type: IMPLANTABLE DEVICE | Site: HIP | Status: FUNCTIONAL

## 2020-06-18 RX ORDER — PROMETHAZINE HYDROCHLORIDE 25 MG/ML
6.25 INJECTION, SOLUTION INTRAMUSCULAR; INTRAVENOUS
Status: DISCONTINUED | OUTPATIENT
Start: 2020-06-18 | End: 2020-06-18 | Stop reason: HOSPADM

## 2020-06-18 RX ORDER — BUPIVACAINE HYDROCHLORIDE AND EPINEPHRINE 2.5; 5 MG/ML; UG/ML
INJECTION, SOLUTION EPIDURAL; INFILTRATION; INTRACAUDAL; PERINEURAL PRN
Status: DISCONTINUED | OUTPATIENT
Start: 2020-06-18 | End: 2020-06-18 | Stop reason: ALTCHOICE

## 2020-06-18 RX ORDER — CHOLECALCIFEROL (VITAMIN D3) 50 MCG
1000 TABLET ORAL DAILY
Status: DISCONTINUED | OUTPATIENT
Start: 2020-06-19 | End: 2020-06-22 | Stop reason: HOSPADM

## 2020-06-18 RX ORDER — SODIUM CHLORIDE, SODIUM LACTATE, POTASSIUM CHLORIDE, CALCIUM CHLORIDE 600; 310; 30; 20 MG/100ML; MG/100ML; MG/100ML; MG/100ML
INJECTION, SOLUTION INTRAVENOUS CONTINUOUS PRN
Status: DISCONTINUED | OUTPATIENT
Start: 2020-06-18 | End: 2020-06-18 | Stop reason: SDUPTHER

## 2020-06-18 RX ORDER — LIDOCAINE HYDROCHLORIDE 20 MG/ML
INJECTION, SOLUTION EPIDURAL; INFILTRATION; INTRACAUDAL; PERINEURAL PRN
Status: DISCONTINUED | OUTPATIENT
Start: 2020-06-18 | End: 2020-06-18 | Stop reason: SDUPTHER

## 2020-06-18 RX ORDER — HYDROCODONE BITARTRATE AND ACETAMINOPHEN 5; 325 MG/1; MG/1
.5-1 TABLET ORAL EVERY 6 HOURS PRN
Qty: 28 TABLET | Refills: 0 | Status: SHIPPED | OUTPATIENT
Start: 2020-06-18 | End: 2020-06-25

## 2020-06-18 RX ORDER — ASPIRIN 81 MG/1
81 TABLET ORAL DAILY
Qty: 30 TABLET | Refills: 0 | Status: SHIPPED | OUTPATIENT
Start: 2020-06-18 | End: 2021-01-01

## 2020-06-18 RX ORDER — LABETALOL HYDROCHLORIDE 5 MG/ML
5 INJECTION, SOLUTION INTRAVENOUS EVERY 10 MIN PRN
Status: DISCONTINUED | OUTPATIENT
Start: 2020-06-18 | End: 2020-06-18 | Stop reason: HOSPADM

## 2020-06-18 RX ORDER — PROPOFOL 10 MG/ML
INJECTION, EMULSION INTRAVENOUS CONTINUOUS PRN
Status: DISCONTINUED | OUTPATIENT
Start: 2020-06-18 | End: 2020-06-18 | Stop reason: SDUPTHER

## 2020-06-18 RX ORDER — HYDRALAZINE HYDROCHLORIDE 20 MG/ML
5 INJECTION INTRAMUSCULAR; INTRAVENOUS EVERY 10 MIN PRN
Status: DISCONTINUED | OUTPATIENT
Start: 2020-06-18 | End: 2020-06-18 | Stop reason: HOSPADM

## 2020-06-18 RX ORDER — MEPERIDINE HYDROCHLORIDE 25 MG/ML
12.5 INJECTION INTRAMUSCULAR; INTRAVENOUS; SUBCUTANEOUS EVERY 5 MIN PRN
Status: DISCONTINUED | OUTPATIENT
Start: 2020-06-18 | End: 2020-06-18 | Stop reason: HOSPADM

## 2020-06-18 RX ORDER — PROPOFOL 10 MG/ML
INJECTION, EMULSION INTRAVENOUS PRN
Status: DISCONTINUED | OUTPATIENT
Start: 2020-06-18 | End: 2020-06-18 | Stop reason: SDUPTHER

## 2020-06-18 RX ORDER — ASPIRIN 81 MG/1
81 TABLET ORAL DAILY
Status: DISCONTINUED | OUTPATIENT
Start: 2020-06-19 | End: 2020-06-22 | Stop reason: HOSPADM

## 2020-06-18 RX ORDER — BUPIVACAINE HYDROCHLORIDE 5 MG/ML
INJECTION, SOLUTION EPIDURAL; INTRACAUDAL PRN
Status: DISCONTINUED | OUTPATIENT
Start: 2020-06-18 | End: 2020-06-18 | Stop reason: SDUPTHER

## 2020-06-18 RX ORDER — EPHEDRINE SULFATE/0.9% NACL/PF 50 MG/5 ML
SYRINGE (ML) INTRAVENOUS PRN
Status: DISCONTINUED | OUTPATIENT
Start: 2020-06-18 | End: 2020-06-18 | Stop reason: SDUPTHER

## 2020-06-18 RX ADMIN — MORPHINE SULFATE 2 MG: 2 INJECTION, SOLUTION INTRAMUSCULAR; INTRAVENOUS at 08:00

## 2020-06-18 RX ADMIN — MEMANTINE HYDROCHLORIDE 10 MG: 10 TABLET, FILM COATED ORAL at 22:38

## 2020-06-18 RX ADMIN — LIDOCAINE HYDROCHLORIDE 40 MG: 20 INJECTION, SOLUTION EPIDURAL; INFILTRATION; INTRACAUDAL; PERINEURAL at 16:29

## 2020-06-18 RX ADMIN — BUPIVACAINE HYDROCHLORIDE 2.2 ML: 5 INJECTION, SOLUTION EPIDURAL; INTRACAUDAL; PERINEURAL at 17:17

## 2020-06-18 RX ADMIN — Medication 20 MG: at 17:20

## 2020-06-18 RX ADMIN — DONEPEZIL HYDROCHLORIDE 10 MG: 5 TABLET, FILM COATED ORAL at 22:38

## 2020-06-18 RX ADMIN — HYDROCODONE BITARTRATE AND ACETAMINOPHEN 1 TABLET: 5; 325 TABLET ORAL at 02:29

## 2020-06-18 RX ADMIN — Medication 2 G: at 16:23

## 2020-06-18 RX ADMIN — LEVOTHYROXINE SODIUM 75 MCG: 75 TABLET ORAL at 05:21

## 2020-06-18 RX ADMIN — MORPHINE SULFATE 2 MG: 2 INJECTION, SOLUTION INTRAMUSCULAR; INTRAVENOUS at 12:11

## 2020-06-18 RX ADMIN — SODIUM CHLORIDE: 4.5 INJECTION, SOLUTION INTRAVENOUS at 12:12

## 2020-06-18 RX ADMIN — SODIUM CHLORIDE, POTASSIUM CHLORIDE, SODIUM LACTATE AND CALCIUM CHLORIDE: 600; 310; 30; 20 INJECTION, SOLUTION INTRAVENOUS at 15:57

## 2020-06-18 RX ADMIN — PROPOFOL 100 MCG/KG/MIN: 10 INJECTION, EMULSION INTRAVENOUS at 16:31

## 2020-06-18 RX ADMIN — PROPOFOL 30 MG: 10 INJECTION, EMULSION INTRAVENOUS at 16:29

## 2020-06-18 RX ADMIN — Medication 10 MG: at 16:51

## 2020-06-18 RX ADMIN — Medication 20 MG: at 17:24

## 2020-06-18 ASSESSMENT — PAIN DESCRIPTION - ONSET
ONSET: ON-GOING
ONSET: ON-GOING

## 2020-06-18 ASSESSMENT — PAIN - FUNCTIONAL ASSESSMENT
PAIN_FUNCTIONAL_ASSESSMENT: PREVENTS OR INTERFERES SOME ACTIVE ACTIVITIES AND ADLS
PAIN_FUNCTIONAL_ASSESSMENT: 0-10
PAIN_FUNCTIONAL_ASSESSMENT: PREVENTS OR INTERFERES SOME ACTIVE ACTIVITIES AND ADLS

## 2020-06-18 ASSESSMENT — PAIN SCALES - GENERAL
PAINLEVEL_OUTOF10: 0
PAINLEVEL_OUTOF10: 7
PAINLEVEL_OUTOF10: 0
PAINLEVEL_OUTOF10: 7
PAINLEVEL_OUTOF10: 7
PAINLEVEL_OUTOF10: 0
PAINLEVEL_OUTOF10: 0

## 2020-06-18 ASSESSMENT — PAIN DESCRIPTION - LOCATION
LOCATION: HIP

## 2020-06-18 ASSESSMENT — PAIN DESCRIPTION - FREQUENCY
FREQUENCY: CONTINUOUS
FREQUENCY: CONTINUOUS

## 2020-06-18 ASSESSMENT — PAIN DESCRIPTION - ORIENTATION
ORIENTATION: LEFT

## 2020-06-18 ASSESSMENT — PAIN DESCRIPTION - PAIN TYPE
TYPE: SURGICAL PAIN
TYPE: ACUTE PAIN
TYPE: SURGICAL PAIN
TYPE: ACUTE PAIN
TYPE: SURGICAL PAIN

## 2020-06-18 ASSESSMENT — PAIN DESCRIPTION - PROGRESSION
CLINICAL_PROGRESSION: GRADUALLY WORSENING
CLINICAL_PROGRESSION: GRADUALLY WORSENING

## 2020-06-18 ASSESSMENT — LIFESTYLE VARIABLES: SMOKING_STATUS: 0

## 2020-06-18 ASSESSMENT — PAIN DESCRIPTION - DESCRIPTORS
DESCRIPTORS: ACHING;DISCOMFORT
DESCRIPTORS: ACHING;DISCOMFORT;DULL

## 2020-06-18 NOTE — CONSULTS
[address] [address]                                  CONSULTATION    PATIENT NAME: oRseann Munguia                  :        1935  MED REC NO:   [id number]                            ROOM:  ACCOUNT NO:   [de-identified]                           ADMIT DATE: 2020  PROVIDER:     Charleen Anthony MD    CONSULT DATE:  2020    REQUESTING PHYSICIAN:  Shahida Khan M.D., from the emergency room. CHIEF COMPLAINT:  Left hip pain. HISTORY OF PRESENT ILLNESS:  This is an 53-year-old minimal community  ambulator without any assistive devices that lives with her daughter in  a single-story dwelling. She put her hand on the armrest of a chair  that is on a hinge that allows it to rotate and it spun out, causing her  to fall yesterday. Prior to the injury, she would be able to go up and  down steps one after the other, but would hold onto a handrail. She  does not feel that she is able to get up from a kneeling position, but  she can tie her shoelaces without any difficulty. She also states her  left knee is bothering her a little bit. She is accompanied by her  daughter, who is present while examining her. Please see my note in the  [address] for further information. PHYSICAL EXAMINATION:  She is awake and alert and answers questions  appropriately. She has no pain with passive range of motion of either  wrist, elbow, or shoulder. There is no crepitation with either  clavicle.  strength is 5/5. She is able to dorsiflex and plantar  flex her left foot. Her left leg is shortened and externally rotated. There is no pain with palpation of the medial or lateral malleoli,  medial or lateral femoral condyle, or medial or lateral tibial plateau  bilaterally. There is no effusion in her left knee. She is a little  tender to light touch at the soft tissue, but I see no bruising around  the left knee. Her extensor mechanism is intact. The skin around her  left hip is unremarkable. There is no pain with logrolling her right  leg. She denies any numbness in her feet or her fingers. DIAGNOSTIC STUDIES:  Radiographic studies showed displaced right  intertrochanteric hip fracture on the right side. X-rays of her right  knee are essentially unremarkable. LABORATORY STUDIES:  Laboratory data reviewed showing a drop in her  hemoglobin to 10.1 from her admission. Her vitamin D level was 37. ASSESSMENT:  Displaced left intertrochanteric hip fracture. PLAN:  I have discussed the risks and benefits with the daughter and the  patient. They verbalized understanding and wished to proceed. I have  had a lengthy discussion with the daughter via phone yesterday and  described the entire procedure as well as the rationale for fixation  versus hip replacement and she has been made aware and given a homework  assignment to review what the implant looks like and how the surgery is  done online for further information. Postoperatively, she will be  weightbearing as tolerated and I will recommend a baby aspirin starting  24 hours postprocedure. We will keep an eye on her blood count. I will  continue to give her some vitamin D, but at a lower level of a 1000  units per day. She will follow up in the office in two weeks' time. She will continue with pneumatic stockings as well as MAGED hose on her  legs to limit DVT prophylaxis. I start the baby aspirin 24 hours  postprocedure in order for the patient to limit the risk of bleeding.         Michael Zavala MD    D: 06/18/2020 16:35:39       T: 06/18/2020 16:49:06     [email or web address]  Job#: [id number]     Doc#: [id number]    CC:  MD Murali Bright MD

## 2020-06-18 NOTE — ANESTHESIA PRE PROCEDURE
injection 10 mL  10 mL Intravenous 2 times per day Leslie Luna MD        sodium chloride flush 0.9 % injection 10 mL  10 mL Intravenous PRN Leslie Luna MD        acetaminophen (TYLENOL) tablet 650 mg  650 mg Oral Q6H PRN Leslie Luna MD        Or    acetaminophen (TYLENOL) suppository 650 mg  650 mg Rectal Q6H PRN Leslie Luna MD        polyethylene glycol [facility]) packet 17 g  17 g Oral Daily PRN Leslie Luna MD        promethazine (PHENERGAN) tablet 12.5 mg  12.5 mg Oral Q6H PRN Leslie Luna MD        Or    ondansetron [facility]) injection 4 mg  4 mg Intravenous Q6H PRN Leslie Luna MD        insulin lispro (HUMALOG) injection vial 0-12 Units  0-12 Units Subcutaneous TID WC Leslie Luna MD        insulin lispro (HUMALOG) injection vial 0-6 Units  0-6 Units Subcutaneous Nightly Leslie Luna MD        glucose (GLUTOSE) 40 % oral gel 15 g  15 g Oral PRN Leslie Luna MD        dextrose 50 % IV solution  12.5 g Intravenous PRN Leslie Luna MD        glucagon (rDNA) injection 1 mg  1 mg Intramuscular PRN Leslie Luna MD        dextrose 5 % solution  100 mL/hr Intravenous PRN Leslie Luna MD        morphine (PF) injection 1 mg  1 mg Intravenous Q3H PRN Evy Briseno MD        Or    morphine (PF) injection 2 mg  2 mg Intravenous Q3H PRN Evy Briseno MD        HYDROcodone-acetaminophen [facility]) 5-325 MG per tablet 0.5 tablet  0.5 tablet Oral Q6H PRN Evy Briseno MD   0.5 tablet at 06/17/20 1900    Or    HYDROcodone-acetaminophen (NORCO) 5-325 MG per tablet 1 tablet  1 tablet Oral Q6H PRN Evy Briseno MD        ceFAZolin (ANCEF) 2 g in sterile water 20 mL IV syringe  2 g Intravenous See Admin Instructions Evy Briseno MD        0.45 % sodium chloride infusion   Intravenous Continuous Evy Briseno MD        vitamin D tablet 2,000 Units  2,000 Units Oral Daily Evy Briseno MD        docusate sodium (COLACE) capsule 100 mg  100 mg Oral Daily Evy Briseno MD   100 mg at 06/17/20 8992       Allergies:     Allergies ROS.                                   Anesthesia Plan      MAC and spinal     ASA 3       Induction: intravenous. MIPS: Postoperative opioids intended and Prophylactic antiemetics administered. Anesthetic plan and risks discussed with patient, child/children and healthcare power of . Anesthesia pre-op assessment was generated by review of the EMR. Patient was sleeping at time of anesthesia pre-op visit. Patient will need to be interviewed and anesthesia pre-op assessment will need to be updated by DOS anesthesiologist        Hoa Saldivar MD   6/17/2020    -----DOS anesthesiologist addendum-----------  Patient seen and evaluated. Patient's daughter concerned regarding cognitive decline due to anesthesia. I explained that spinal anesthesia would be better for limiting cognitive decline due to anesthesia. Risks and benefits of spinal anesthetic discussed with patient's daughter, who states she is also her mom's POA. All patient's daughter's questions were answered to her satisfaction. Patient's daughter consents to and agrees to spinal anesthetic. She is also agreeable to GETA if a spinal technique is unsuccessful.   [address] Lazaro HOFFMAN  6/18/20

## 2020-06-18 NOTE — H&P
[address] [facility]                  [address]                              HISTORY AND PHYSICAL    PATIENT NAME: Yudelka Eubanks                  :        1935  MED REC NO:   [id number]                            ROOM:       0703  ACCOUNT NO:   [de-identified]                           ADMIT DATE: 2020  PROVIDER:     Joel Barba MD    CHIEF COMPLAINT:  Left hip fracture. HISTORY OF PRESENTING ILLNESS:  This 82-year-old with a history of  hypertension, hyperlipidemia, diabetes, hypothyroidism, and dementia,  who fell at home fracturing her left hip. The patient is admitted, will  be having surgery. RECENT PRESENT MEDICATIONS:  See med rec in the chart. PAST MEDICAL HISTORY:  As described above. PAST SURGICAL HISTORY:  None. SOCIAL HISTORY:  Lives at home with her daughter. Quit smoking many  years ago. Occasional alcohol. FAMILY MEDICAL HISTORY:  Noncontributory. REVIEW OF SYSTEMS:  ALLERGIES:  Just seasonal, no med allergies. SKIN AND LYMPHATICS:  Negative. CENTRAL NERVOUS SYSTEM:  She does have dementia. CIRCULATORY:  She denies chest pain, orthopnea, or PND. DIGESTIVE:  Denies abdominal pain. Denies diarrhea, nausea, melena,  hematochezia, constipation. GENITOURINARY:  She does have history of chronic UTIs. MUSCULOSKELETAL:  Fractured hip as discussed above. PHYSICAL EXAMINATION:  GENERAL:  The patient is lying in bed. She is in no distress at all. VITAL SIGNS:  Temperature 98.1, pulse 70, respirations 16, pressure  128/58, O2 sat 98% on room air. SKIN:  Warm and dry. Good turgor. No lesions. No pallor. No  jaundice. EYES:  Reveal no icterus. NECK:  Supple without bruits or masses. CHEST:  Clear to auscultation. HEART:  Regular rate and rhythm without murmur, gallop, or rub. ABDOMEN:  Soft, nontender. EXTREMITIES:  Left leg is short and externally rotated. She has no  edema.   She has decent peripheral pulses. NEUROLOGICAL:  There does not appear to be any focal neurological  deficits. LABORATORIES:  CMP basically unremarkable. CBC:  Hemoglobin is 10. White count is normal.  Sugar this morning was 120. Chest x-ray was  negative. CT of the head and cervical spine showed no acute changes. EKG was normal.    DIAGNOSTIC IMPRESSION:  Left intertrochanteric hip fracture, history of  hypertension, hyperlipidemia, diabetes, hypothyroidism, and dementia. The patient is medically stable for surgery. PLAN:  Orthopedic surgery and then placement at rehab once stable.         Anirudh Gutierrez MD    D: 06/18/2020 6:47:31       T: 06/18/2020 6:55:15     /[email or web address]  Job#: [id number]     Doc#: [id number]    CC:

## 2020-06-18 NOTE — ANESTHESIA PROCEDURE NOTES
Spinal Block    Patient location during procedure: OR  Start time: 6/18/2020 5:00 PM  End time: 6/18/2020 5:07 PM  Reason for block: primary anesthetic and at surgeon's request  Staffing  Anesthesiologist: Hayden Mustafa MD  Resident/CRNA: EFREN Prince CRNA  Performed: resident/CRNA   Preanesthetic Checklist  Completed: patient identified, site marked, surgical consent, pre-op evaluation, timeout performed, IV checked, risks and benefits discussed, monitors and equipment checked, anesthesia consent given, oxygen available and patient being monitored  Spinal Block  Patient position: sitting  Prep: ChloraPrep  Patient monitoring: cardiac monitor, continuous pulse ox, continuous capnometry and frequent blood pressure checks  Approach: midline  Provider prep: mask, sterile gloves and sterile gown  Local infiltration: lidocaine  Agent: bupivacaine  Dose: 2.2  Dose: 2.2  Needle  Needle type: Quincke   Needle gauge: 22 G  Needle length: 3.5 in  Assessment  Sensory level: T6  Swirl obtained: Yes  CSF: clear  Attempts: 1  Hemodynamics: stable

## 2020-06-18 NOTE — DISCHARGE INSTR - COC
MENTAL STATUS:13369}    IV Access:  { CECILIA IV ACCESS:952764991}    Nursing Mobility/ADLs:  Walking   {P DME DYDA:981902155}  Transfer  {P DME YOEW:765440765}  Bathing  {CHP DME CIIN:006668203}  Dressing  {CHP DME GONL:206287636}  Toileting  {P DME DBLU:316013222}  Feeding  {[facility] DME DOFT:507334219}  Med Admin  {P DME AGPT:545845253}  Med Delivery   { CECILIA MED Delivery:632729390}    Wound Care Documentation and Therapy:  Keep dressing clean and dry, may remove staples 7/2 and get wet, don't soak blot dry. May cover with dressing to prevent irritation of staples. Elimination:  Continence:   · Bowel: {YES / NP:11285}  · Bladder: {YES / XO:08290}  Urinary Catheter: {Urinary Catheter:518613287}   Colostomy/Ileostomy/Ileal Conduit: {YES / HD:58932}       Date of Last BM: ***  No intake or output data in the 24 hours ending 06/18/20 1626  No intake/output data recorded.     Safety Concerns:     [address] Safety Concerns:209100736}    Impairments/Disabilities:      [address] Impairments/Disabilities:387583089}    Nutrition Therapy:  Current Nutrition Therapy:   [address] Diet List:625441707}    Routes of Feeding: {[facility] DME Other Feedings:776470838}  Liquids: {Slp liquid thickness:67527}  Daily Fluid Restriction: {P DME Yes amt example:866039524}  Last Modified Barium Swallow with Video (Video Swallowing Test): {Done Not Done GFQV:102450430}    Treatments at the Time of Hospital Discharge:   Respiratory Treatments: ***  Oxygen Therapy:  {Therapy; copd oxygen:00158}  Ventilator:    { CC Vent QZML:454966322}    Rehab Therapies: {THERAPEUTIC INTERVENTION:2915881306}  Weight Bearing Status/Restrictions: wbat, no restrictions  Other Medical Equipment (for information only, NOT a DME order):  {EQUIPMENT:395851831}  Other Treatments: ***    Patient's personal belongings (please select all that are sent with patient):  {P DME Belongings:995475948}    RN SIGNATURE:  {Esignature:385200446}    CASE MANAGEMENT/SOCIAL WORK SECTION    Inpatient Status Date: ***    Readmission Risk Assessment Score:  Readmission Risk              Risk of Unplanned Readmission:        14           Discharging to Facility/ Agency   · Name:   · Address:  · Phone:  · Fax:    Dialysis Facility (if applicable)   · Name:  · Address:  · Dialysis Schedule:  · Phone:  · Fax:    / signature: {Esignature:794774165}    PHYSICIAN SECTION    Prognosis: {Prognosis:2245908800}    Condition at Discharge: [address] [place] [address] Patient Condition:134534952}    Rehab Potential (if transferring to Rehab): {Prognosis:7931215699}    Recommended Labs or Other Treatments After Discharge: ***    Physician Certification: I certify the above information and transfer of Cadence Chowdhury  is necessary for the continuing treatment of the diagnosis listed and that she requires {Admit to Appropriate Level of Care:07828} for {GREATER/LESS:811332157} 30 days.      Update Admission H&P: {CHP DME Changes in OAXBS:200028454}    PHYSICIAN SIGNATURE:  {Esignature:851900365}

## 2020-06-19 LAB
ANION GAP SERPL CALCULATED.3IONS-SCNC: 9 MMOL/L (ref 7–16)
BASOPHILS ABSOLUTE: 0.01 E9/L (ref 0–0.2)
BASOPHILS RELATIVE PERCENT: 0.1 % (ref 0–2)
BUN BLDV-MCNC: 21 MG/DL (ref 8–23)
CALCIUM SERPL-MCNC: 8.7 MG/DL (ref 8.6–10.2)
CHLORIDE BLD-SCNC: 99 MMOL/L (ref 98–107)
CO2: 24 MMOL/L (ref 22–29)
CREAT SERPL-MCNC: 1 MG/DL (ref 0.5–1)
EOSINOPHILS ABSOLUTE: 0.02 E9/L (ref 0.05–0.5)
EOSINOPHILS RELATIVE PERCENT: 0.3 % (ref 0–6)
GFR AFRICAN AMERICAN: >60
GFR NON-AFRICAN AMERICAN: 53 ML/MIN/1.73
GLUCOSE BLD-MCNC: 193 MG/DL (ref 74–99)
HCT VFR BLD CALC: 28.3 % (ref 34–48)
HEMOGLOBIN: 9.3 G/DL (ref 11.5–15.5)
IMMATURE GRANULOCYTES #: 0.02 E9/L
IMMATURE GRANULOCYTES %: 0.3 % (ref 0–5)
LYMPHOCYTES ABSOLUTE: 0.55 E9/L (ref 1.5–4)
LYMPHOCYTES RELATIVE PERCENT: 7 % (ref 20–42)
MCH RBC QN AUTO: 29.7 PG (ref 26–35)
MCHC RBC AUTO-ENTMCNC: 32.9 % (ref 32–34.5)
MCV RBC AUTO: 90.4 FL (ref 80–99.9)
METER GLUCOSE: 138 MG/DL (ref 74–99)
METER GLUCOSE: 147 MG/DL (ref 74–99)
METER GLUCOSE: 151 MG/DL (ref 74–99)
METER GLUCOSE: 153 MG/DL (ref 74–99)
MONOCYTES ABSOLUTE: 0.73 E9/L (ref 0.1–0.95)
MONOCYTES RELATIVE PERCENT: 9.3 % (ref 2–12)
NEUTROPHILS ABSOLUTE: 6.48 E9/L (ref 1.8–7.3)
NEUTROPHILS RELATIVE PERCENT: 83 % (ref 43–80)
PDW BLD-RTO: 14.2 FL (ref 11.5–15)
PLATELET # BLD: 135 E9/L (ref 130–450)
PMV BLD AUTO: 11.2 FL (ref 7–12)
POTASSIUM SERPL-SCNC: 4.3 MMOL/L (ref 3.5–5)
RBC # BLD: 3.13 E12/L (ref 3.5–5.5)
RBC # BLD: NORMAL 10*6/UL
SODIUM BLD-SCNC: 132 MMOL/L (ref 132–146)
WBC # BLD: 7.8 E9/L (ref 4.5–11.5)

## 2020-06-19 PROCEDURE — 80048 BASIC METABOLIC PNL TOTAL CA: CPT

## 2020-06-19 PROCEDURE — 82962 GLUCOSE BLOOD TEST: CPT

## 2020-06-19 PROCEDURE — 6360000002 HC RX W HCPCS: Performed by: ORTHOPAEDIC SURGERY

## 2020-06-19 PROCEDURE — 97530 THERAPEUTIC ACTIVITIES: CPT

## 2020-06-19 PROCEDURE — 97165 OT EVAL LOW COMPLEX 30 MIN: CPT

## 2020-06-19 PROCEDURE — 85025 COMPLETE CBC W/AUTO DIFF WBC: CPT

## 2020-06-19 PROCEDURE — 6370000000 HC RX 637 (ALT 250 FOR IP): Performed by: ORTHOPAEDIC SURGERY

## 2020-06-19 PROCEDURE — 2580000003 HC RX 258: Performed by: ORTHOPAEDIC SURGERY

## 2020-06-19 PROCEDURE — 36415 COLL VENOUS BLD VENIPUNCTURE: CPT

## 2020-06-19 PROCEDURE — 97161 PT EVAL LOW COMPLEX 20 MIN: CPT

## 2020-06-19 PROCEDURE — 1200000000 HC SEMI PRIVATE

## 2020-06-19 RX ADMIN — CEFAZOLIN 1 G: 1 INJECTION, POWDER, FOR SOLUTION INTRAMUSCULAR; INTRAVENOUS at 01:51

## 2020-06-19 RX ADMIN — DOCUSATE SODIUM 100 MG: 100 CAPSULE, LIQUID FILLED ORAL at 08:35

## 2020-06-19 RX ADMIN — MEMANTINE HYDROCHLORIDE 10 MG: 10 TABLET, FILM COATED ORAL at 22:03

## 2020-06-19 RX ADMIN — DONEPEZIL HYDROCHLORIDE 10 MG: 5 TABLET, FILM COATED ORAL at 22:03

## 2020-06-19 RX ADMIN — INSULIN LISPRO 2 UNITS: 100 INJECTION, SOLUTION INTRAVENOUS; SUBCUTANEOUS at 11:28

## 2020-06-19 RX ADMIN — LEVOTHYROXINE SODIUM 75 MCG: 75 TABLET ORAL at 07:28

## 2020-06-19 RX ADMIN — METFORMIN HYDROCHLORIDE 1000 MG: 1000 TABLET ORAL at 22:03

## 2020-06-19 RX ADMIN — CEFAZOLIN 1 G: 1 INJECTION, POWDER, FOR SOLUTION INTRAMUSCULAR; INTRAVENOUS at 08:29

## 2020-06-19 RX ADMIN — HYDROCODONE BITARTRATE AND ACETAMINOPHEN 1 TABLET: 5; 325 TABLET ORAL at 07:28

## 2020-06-19 RX ADMIN — FENOFIBRATE 160 MG: 160 TABLET ORAL at 08:35

## 2020-06-19 RX ADMIN — CHOLECALCIFEROL TAB 50 MCG (2000 UNIT) 1000 UNITS: 50 TAB at 08:28

## 2020-06-19 RX ADMIN — HYDROCODONE BITARTRATE AND ACETAMINOPHEN 1 TABLET: 5; 325 TABLET ORAL at 01:23

## 2020-06-19 RX ADMIN — INSULIN LISPRO 2 UNITS: 100 INJECTION, SOLUTION INTRAVENOUS; SUBCUTANEOUS at 08:30

## 2020-06-19 RX ADMIN — ASPIRIN 81 MG: 81 TABLET, COATED ORAL at 17:48

## 2020-06-19 RX ADMIN — INSULIN LISPRO 1 UNITS: 100 INJECTION, SOLUTION INTRAVENOUS; SUBCUTANEOUS at 22:03

## 2020-06-19 RX ADMIN — HYDROCODONE BITARTRATE AND ACETAMINOPHEN 1 TABLET: 5; 325 TABLET ORAL at 14:15

## 2020-06-19 ASSESSMENT — PAIN SCALES - GENERAL
PAINLEVEL_OUTOF10: 0
PAINLEVEL_OUTOF10: 8
PAINLEVEL_OUTOF10: 0
PAINLEVEL_OUTOF10: 0
PAINLEVEL_OUTOF10: 8
PAINLEVEL_OUTOF10: 7
PAINLEVEL_OUTOF10: 0
PAINLEVEL_OUTOF10: 0

## 2020-06-19 ASSESSMENT — PAIN DESCRIPTION - PAIN TYPE: TYPE: SURGICAL PAIN

## 2020-06-19 ASSESSMENT — PAIN SCALES - PAIN ASSESSMENT IN ADVANCED DEMENTIA (PAINAD)
BREATHING: 0
TOTALSCORE: 0
BODYLANGUAGE: 0
NEGVOCALIZATION: 0
CONSOLABILITY: 0
FACIALEXPRESSION: 0

## 2020-06-19 ASSESSMENT — PAIN DESCRIPTION - PROGRESSION: CLINICAL_PROGRESSION: GRADUALLY WORSENING

## 2020-06-19 ASSESSMENT — PAIN DESCRIPTION - LOCATION: LOCATION: HIP

## 2020-06-19 ASSESSMENT — PAIN DESCRIPTION - DESCRIPTORS: DESCRIPTORS: ACHING;DISCOMFORT;DULL

## 2020-06-19 ASSESSMENT — PAIN DESCRIPTION - ORIENTATION: ORIENTATION: LEFT

## 2020-06-19 ASSESSMENT — PAIN DESCRIPTION - ONSET: ONSET: ON-GOING

## 2020-06-19 ASSESSMENT — PAIN - FUNCTIONAL ASSESSMENT: PAIN_FUNCTIONAL_ASSESSMENT: ACTIVITIES ARE NOT PREVENTED

## 2020-06-19 ASSESSMENT — PAIN DESCRIPTION - FREQUENCY: FREQUENCY: CONTINUOUS

## 2020-06-19 NOTE — PROGRESS NOTES
Subjective: The patient is awake and alert. No problems overnight. Denies chest pain, angina, and dyspnea. Denies abdominal pain. Tolerating diet. No nausea or vomiting. Objective:    /60   Pulse 76   Temp 97.5 °F (36.4 °C) (Oral)   Resp 16   Ht 5' 4\" (1.626 m)   Wt 167 lb (75.8 kg)   SpO2 97%   BMI 28.67 kg/m²     Heart:  RRR, no murmurs, gallops, or rubs.   Lungs:  CTA bilaterally, no wheeze, rales or rhonchi  Abd: bowel sounds present, nontender, nondistended, no masses  Extrem:  No clubbing, cyanosis, or edema    CBC with Differential:    Lab Results   Component Value Date    WBC 7.8 06/19/2020    RBC 3.13 06/19/2020    HGB 9.3 06/19/2020    HCT 28.3 06/19/2020     06/19/2020    MCV 90.4 06/19/2020    MCH 29.7 06/19/2020    MCHC 32.9 06/19/2020    RDW 14.2 06/19/2020    LYMPHOPCT 7.0 06/19/2020    MONOPCT 9.3 06/19/2020    BASOPCT 0.1 06/19/2020    MONOSABS 0.73 06/19/2020    LYMPHSABS 0.55 06/19/2020    EOSABS 0.02 06/19/2020    BASOSABS 0.01 06/19/2020     CMP:    Lab Results   Component Value Date     06/19/2020    K 4.3 06/19/2020    K 4.3 06/18/2020    CL 99 06/19/2020    CO2 24 06/19/2020    BUN 21 06/19/2020    CREATININE 1.0 06/19/2020    GFRAA >60 06/19/2020    LABGLOM 53 06/19/2020    GLUCOSE 193 06/19/2020    PROT 6.5 06/18/2020    LABALBU 3.4 06/18/2020    CALCIUM 8.7 06/19/2020    BILITOT 0.5 06/18/2020    ALKPHOS 68 06/18/2020    AST 29 06/18/2020    ALT 18 06/18/2020        Assessment:    Patient Active Problem List   Diagnosis    Intertrochanteric fracture of left hip, closed, initial encounter ([facility] [address]   hypertension  NIDDM  Dementia    Plan:  Continue current care per orthopedics  Discharge planning        Leslie Luna  8:32 AM  6/19/2020

## 2020-06-19 NOTE — PROGRESS NOTES
[employer]  Small steps armchair to bed  Max increased time, hand over hand placement of hands, assist in weight shift to allow step of R LE  Min A during ADLs   Balance Sitting: fair/fair minus pending attention and level of pain  Sitting EOB maylin x7 min    Standing: poor at [employer]  Standing maylin x4 min     Activity Tolerance Poor  significantly limited due to reports of pain in L LE  standing maylin x5-6 min with fair balance during self care tasks             Treatment: Patient educated on techniques for completion of ADL, safe functional transfers and functional mobility. Patient required cues for follow through with proper hand/foot placement, pacing, safety, attention, sequencing and technique in bed mobility, functional transfers, functional mobility and LB dressing in preparation for maximum independence in all self care tasks. Hand Dominance: Right [x]  Left []   Strength ROM Additional Info:    RUE  3+/5 WFL good  and FMC/dexterity noted during ADL tasks     LUE Proximally: 2-/5  Distally: 3+/5 WFL elbow to digits.  L shoulder flexion approx 45 degrees fair  and FMC/dexterity noted during ADL tasks         Hearing: [place]  Vision: WFL, glasses  Sensation:  No c/o numbness or tingling   Tone: WFL                             Long Term Goal (1-3 wks): Pt will maximize functional performance in all self care tasks/functional transfers with good follow through of all trained techniques for safe transition to next level of care    Eval Complexity: Low    Assessment of current deficits   Functional mobility [x]  ADLs [x] Strength [x]  Cognition []  Functional transfers  [x] IADLs [x] Safety Awareness [x]  Endurance [x]  Fine Motor Coordination [] Balance [x] Vision/perception [] Sensation []   Gross Motor Coordination [] ROM [] Delirium []                  Motor Control []    Plan of Care:   ADL retraining [x]   Equipment needs [x]   Neuromuscular re-education [x] Energy Conservation Techniques [x]  Functional Transfer

## 2020-06-19 NOTE — PROGRESS NOTES
Physical Therapy    Facility/Department: [facility] SURGERY  Initial Assessment    NAME: Luciaan Person  : 1935  MRN: [id number]    Date of Service: 2020      Attending Provider:  Alise Diaz MD    Evaluating PT:  Didier Molina P.T. Room #:  6669/4468-Q  Diagnosis:  L hip fx due to fall at home  Pertinent PMHx/PSHx:  dementia  Procedure/Surgery:  20 ORIF L hip with IM nail  Precautions:  WBAT LLE, falls, bed/chair alarm if no person is present with pt. Equipment Needs:  Wheeled walker    SUBJECTIVE:    Pt lives with daughter in a 1 story home with 2 stairs and 1 rail to enter. Pt ambulated with no AD PTA. OBJECTIVE:   Initial Evaluation  Date: 20 Treatment Short Term/ Long Term   Goals   Was pt agreeable to Eval/treatment? yes     Does pt have pain? No c/o pain at rest, but had increased L hip pain with moving and standing     Bed Mobility  Rolling: MAX A  Supine to sit: MAX A  Sit to supine: NA  Scooting: MAX A  Supervision   Transfers Sit to stand: MAX A x2  Stand to sit: MAX A x2  Stand pivot: MOD A x2 with ww  SBA   Ambulation   3 feet with ww MOD A x2  150 feet with ww SBA   Stair negotiation: ascended and descended NA  2 steps with 1 rail SBA   AM-PAC 6 Clicks 58/92       BLE ROM is WFL. RLE strength is grossly 4/5 and L hip 2/5 to 3-/5, knee 3-/5, and ankle 4-/5. Edema:  None noted  Balance: sitting is SBA and standing with ww is MOD A x2  Endurance: Fair-    Patient education  Pt educated on WBAT LLE, gait with ww, hand placement during transfers, and bed mobility. Patient response to education:   Pt verbalized understanding Pt demonstrated skill Pt requires further education in this area   yes inconsistent yes     ASSESSMENT:    Comments:  Pt was in bed and had increased L hip pain with movement. She moved slowly due to pain and required increased time to complete functional mobility tasks.   She sat EOB x 8 min working on posture and core strength and exercises 57039 ** minutes  [] Neuromuscular reeducation 49354 ** minutes     Lamont Marsh., P.T.   License Number: [id number]

## 2020-06-19 NOTE — PROGRESS NOTES
[facility] Quality Flow/Interdisciplinary Rounds Progress Note        Quality Flow Rounds held on June 19, 2020    Disciplines Attending:  Bedside Nurse, ,  and Nursing Unit Leadership    Rory Alexandre was admitted on 6/17/2020 12:55 PM    Anticipated Discharge Date:       Disposition:    Adam Score:  Adam Scale Score: 16    Readmission Risk              Risk of Unplanned Readmission:        11           Discussed patient goal for the day, patient clinical progression, and barriers to discharge. The following Goal(s) of the Day/Commitment(s) have been identified:  PT/OT eval, safety, discharge planning.        Jasvir James  June 19, 2020

## 2020-06-20 LAB
BASOPHILS ABSOLUTE: 0.01 E9/L (ref 0–0.2)
BASOPHILS RELATIVE PERCENT: 0.2 % (ref 0–2)
EOSINOPHILS ABSOLUTE: 0.05 E9/L (ref 0.05–0.5)
EOSINOPHILS RELATIVE PERCENT: 0.8 % (ref 0–6)
HCT VFR BLD CALC: 27.5 % (ref 34–48)
HEMOGLOBIN: 8.8 G/DL (ref 11.5–15.5)
IMMATURE GRANULOCYTES #: 0.02 E9/L
IMMATURE GRANULOCYTES %: 0.3 % (ref 0–5)
LYMPHOCYTES ABSOLUTE: 0.7 E9/L (ref 1.5–4)
LYMPHOCYTES RELATIVE PERCENT: 10.6 % (ref 20–42)
MCH RBC QN AUTO: 29 PG (ref 26–35)
MCHC RBC AUTO-ENTMCNC: 32 % (ref 32–34.5)
MCV RBC AUTO: 90.8 FL (ref 80–99.9)
METER GLUCOSE: 115 MG/DL (ref 74–99)
METER GLUCOSE: 139 MG/DL (ref 74–99)
METER GLUCOSE: 143 MG/DL (ref 74–99)
METER GLUCOSE: 154 MG/DL (ref 74–99)
MONOCYTES ABSOLUTE: 0.54 E9/L (ref 0.1–0.95)
MONOCYTES RELATIVE PERCENT: 8.2 % (ref 2–12)
NEUTROPHILS ABSOLUTE: 5.26 E9/L (ref 1.8–7.3)
NEUTROPHILS RELATIVE PERCENT: 79.9 % (ref 43–80)
PDW BLD-RTO: 14.3 FL (ref 11.5–15)
PLATELET # BLD: 122 E9/L (ref 130–450)
PMV BLD AUTO: 11.1 FL (ref 7–12)
RBC # BLD: 3.03 E12/L (ref 3.5–5.5)
WBC # BLD: 6.6 E9/L (ref 4.5–11.5)

## 2020-06-20 PROCEDURE — 36415 COLL VENOUS BLD VENIPUNCTURE: CPT

## 2020-06-20 PROCEDURE — 97530 THERAPEUTIC ACTIVITIES: CPT

## 2020-06-20 PROCEDURE — 1200000000 HC SEMI PRIVATE

## 2020-06-20 PROCEDURE — 6370000000 HC RX 637 (ALT 250 FOR IP): Performed by: ORTHOPAEDIC SURGERY

## 2020-06-20 PROCEDURE — 6370000000 HC RX 637 (ALT 250 FOR IP): Performed by: FAMILY MEDICINE

## 2020-06-20 PROCEDURE — 97535 SELF CARE MNGMENT TRAINING: CPT

## 2020-06-20 PROCEDURE — 85025 COMPLETE CBC W/AUTO DIFF WBC: CPT

## 2020-06-20 PROCEDURE — 97110 THERAPEUTIC EXERCISES: CPT

## 2020-06-20 PROCEDURE — 87088 URINE BACTERIA CULTURE: CPT

## 2020-06-20 PROCEDURE — 82962 GLUCOSE BLOOD TEST: CPT

## 2020-06-20 PROCEDURE — 99231 SBSQ HOSP IP/OBS SF/LOW 25: CPT | Performed by: FAMILY MEDICINE

## 2020-06-20 RX ADMIN — DOCUSATE SODIUM 100 MG: 100 CAPSULE, LIQUID FILLED ORAL at 08:26

## 2020-06-20 RX ADMIN — LEVOTHYROXINE SODIUM 75 MCG: 75 TABLET ORAL at 06:51

## 2020-06-20 RX ADMIN — INSULIN LISPRO 2 UNITS: 100 INJECTION, SOLUTION INTRAVENOUS; SUBCUTANEOUS at 16:30

## 2020-06-20 RX ADMIN — DONEPEZIL HYDROCHLORIDE 10 MG: 5 TABLET, FILM COATED ORAL at 22:30

## 2020-06-20 RX ADMIN — CHOLECALCIFEROL TAB 50 MCG (2000 UNIT) 1000 UNITS: 50 TAB at 08:26

## 2020-06-20 RX ADMIN — INSULIN LISPRO 1 UNITS: 100 INJECTION, SOLUTION INTRAVENOUS; SUBCUTANEOUS at 22:32

## 2020-06-20 RX ADMIN — HYDROCODONE BITARTRATE AND ACETAMINOPHEN 0.5 TABLET: 5; 325 TABLET ORAL at 08:26

## 2020-06-20 RX ADMIN — FENOFIBRATE 160 MG: 160 TABLET ORAL at 08:26

## 2020-06-20 RX ADMIN — ASPIRIN 81 MG: 81 TABLET, COATED ORAL at 16:42

## 2020-06-20 RX ADMIN — MEMANTINE HYDROCHLORIDE 10 MG: 10 TABLET, FILM COATED ORAL at 22:30

## 2020-06-20 RX ADMIN — HYDROCODONE BITARTRATE AND ACETAMINOPHEN 1 TABLET: 5; 325 TABLET ORAL at 22:30

## 2020-06-20 RX ADMIN — PSYLLIUM HUSK 1 PACKET: 3.4 GRANULE ORAL at 11:45

## 2020-06-20 RX ADMIN — MAGNESIUM HYDROXIDE 30 ML: 400 SUSPENSION ORAL at 11:45

## 2020-06-20 ASSESSMENT — PAIN DESCRIPTION - ORIENTATION
ORIENTATION: LEFT
ORIENTATION: LEFT

## 2020-06-20 ASSESSMENT — PAIN DESCRIPTION - DESCRIPTORS
DESCRIPTORS: ACHING;DISCOMFORT;DULL
DESCRIPTORS: SORE;PRESSURE;TENDER

## 2020-06-20 ASSESSMENT — PAIN DESCRIPTION - PAIN TYPE
TYPE: SURGICAL PAIN
TYPE: SURGICAL PAIN

## 2020-06-20 ASSESSMENT — PAIN DESCRIPTION - LOCATION
LOCATION: HIP
LOCATION: HIP

## 2020-06-20 ASSESSMENT — PAIN SCALES - GENERAL
PAINLEVEL_OUTOF10: 0
PAINLEVEL_OUTOF10: 0
PAINLEVEL_OUTOF10: 6
PAINLEVEL_OUTOF10: 0
PAINLEVEL_OUTOF10: 8
PAINLEVEL_OUTOF10: 0
PAINLEVEL_OUTOF10: 8

## 2020-06-20 ASSESSMENT — PAIN SCALES - PAIN ASSESSMENT IN ADVANCED DEMENTIA (PAINAD)
BODYLANGUAGE: 0
TOTALSCORE: 0
NEGVOCALIZATION: 0
BREATHING: 0
NEGVOCALIZATION: 2
FACIALEXPRESSION: 0
TOTALSCORE: 8
CONSOLABILITY: 1
BODYLANGUAGE: 2
FACIALEXPRESSION: 2
CONSOLABILITY: 0
BREATHING: 1

## 2020-06-20 ASSESSMENT — PAIN - FUNCTIONAL ASSESSMENT
PAIN_FUNCTIONAL_ASSESSMENT: PREVENTS OR INTERFERES SOME ACTIVE ACTIVITIES AND ADLS
PAIN_FUNCTIONAL_ASSESSMENT: ACTIVITIES ARE NOT PREVENTED
PAIN_FUNCTIONAL_ASSESSMENT: PREVENTS OR INTERFERES SOME ACTIVE ACTIVITIES AND ADLS

## 2020-06-20 ASSESSMENT — PAIN DESCRIPTION - ONSET
ONSET: ON-GOING
ONSET: ON-GOING

## 2020-06-20 ASSESSMENT — PAIN DESCRIPTION - FREQUENCY
FREQUENCY: CONTINUOUS
FREQUENCY: INTERMITTENT

## 2020-06-20 ASSESSMENT — PAIN DESCRIPTION - PROGRESSION
CLINICAL_PROGRESSION: RAPIDLY IMPROVING
CLINICAL_PROGRESSION: GRADUALLY IMPROVING
CLINICAL_PROGRESSION: GRADUALLY WORSENING

## 2020-06-20 NOTE — PROGRESS NOTES
Daughter present and with good encouragement. Exercise performed with Pt supine, noted limited participation, Pt states hip felt better after movement. Pt assisted to EOB, sat EOB Mod A for balance. Gait performed, praveena very slow, inconsistent and laboring, step to pattern used. Pt unsteady throughout, requires constant hand on assist for balance and safety, is presently unsafe to gait or transfer alone. Pt fatigued after activity. Treatment: Pt practiced and was instructed in the following treatment: UE usage to assist with transfers, gait promoting posture and staying within [employer] base of support, exercise promoting circulation, ROM and strengthening    Pt was left in a bedside chair with call light in reach, Daughter present    Time in 0841   Time out 0905   Total Treatment Time 24 minutes   CPT codes:     Therapeutic activities 25181 12 minutes   Therapeutic exercises 91694 12 minutes       Pt is making good progress toward established Physical Therapy goals as per increased functional mobility performed. Continue with physical therapy current plan of care.     CallmyName PTA   License Number: [id number]

## 2020-06-20 NOTE — PROGRESS NOTES
maximize safety and patient's participation with sit-to-stand. Stand-to-Sit: Mod A  Verbal/tactile/visual cues needed to facilitate proper hand placement.  Min A   Functional Mobility Max A with [employer]  Small steps armchair to bed  Max increased time, hand over hand placement of hands, assist in weight shift to allow step of R LE  Mod A for few small steps from EOB to bedside chair (with walker). Assistance needed with management of walker. Consistent cues needed for sequencing, recall, and problem solving. Increased time needed. Min A during ADLs   Balance Sitting: fair/fair minus pending attention and level of pain  Sitting EOB maylin x7 min     Standing: poor at [employer]  Standing maylin x4 min Sitting: Fair  (at EOB)    Standing: Poor+  (with walker)     Activity Tolerance Poor  significantly limited due to reports of pain in L LE Poor  Limited secondary to L LE pain (nursing present and aware).   standing maylin x5-6 min with fair balance during self care tasks       Comments: Upon this OTR's arrival to patient's room, patient supine in bed. Upon conclusion of this session, patient seated in bedside chair with all lines and tubes intact, patient's daughter present, and call light within reach. Further skilled OT treatment indicated to increase patient's safety and independence with completion of ADL/IADL tasks in order to maximize patient's functional independence and quality of life. Education: Family education provided regardin) potential benefits of DME (3:1/BSC and extended tub bench) to maximize independence/safety with ADLs upon return home, 2) importance of having 24-hour assistance to ensure safety upon return home, 3) techniques to maximize independence/safety with ADLs, 4) importance of encouraging patient's participation in ADLs and other functional tasks, 5) importance of OOB activities, 6) potential benefits of continued therapy services upon discharge.  Patient's daughter verbalized understanding. Patient education provided regardin) safe transfer techniques, 2) techniques to maximize safety with use of walker, 3) importance of OOB activities, 4) importance of having assistance with OOB activities to prevent falls. Patient demonstrated Poor understanding. · Patient has made progress towards set goals. · Continue with current plan of care. Treatment Time In: 1330  Treatment Time Out: 8402    Treatment Charges: Minutes: Units: Ther Ex  84845     Manual Therapy Duane Moreno 8141 88743 08 1   ADL/Home Mgt 75159 15 1   Neuro Re-ed 33253     Group Therapy      Orthotic manage/training  33589     Total Timed Treatment 30 2     Lashonda Walker OTR/L  License Number: [id number]

## 2020-06-21 PROBLEM — R33.8 POSTOPERATIVE URINARY RETENTION: Status: ACTIVE | Noted: 2020-06-21

## 2020-06-21 PROBLEM — K59.03 DRUG-INDUCED CONSTIPATION: Status: ACTIVE | Noted: 2020-06-21

## 2020-06-21 PROBLEM — N99.89 POSTOPERATIVE URINARY RETENTION: Status: ACTIVE | Noted: 2020-06-21

## 2020-06-21 LAB
BASOPHILS ABSOLUTE: 0.01 E9/L (ref 0–0.2)
BASOPHILS RELATIVE PERCENT: 0.1 % (ref 0–2)
EOSINOPHILS ABSOLUTE: 0.09 E9/L (ref 0.05–0.5)
EOSINOPHILS RELATIVE PERCENT: 1.3 % (ref 0–6)
HCT VFR BLD CALC: 27.4 % (ref 34–48)
HEMOGLOBIN: 8.7 G/DL (ref 11.5–15.5)
IMMATURE GRANULOCYTES #: 0.02 E9/L
IMMATURE GRANULOCYTES %: 0.3 % (ref 0–5)
LYMPHOCYTES ABSOLUTE: 1.02 E9/L (ref 1.5–4)
LYMPHOCYTES RELATIVE PERCENT: 14.9 % (ref 20–42)
MCH RBC QN AUTO: 29 PG (ref 26–35)
MCHC RBC AUTO-ENTMCNC: 31.8 % (ref 32–34.5)
MCV RBC AUTO: 91.3 FL (ref 80–99.9)
METER GLUCOSE: 137 MG/DL (ref 74–99)
METER GLUCOSE: 155 MG/DL (ref 74–99)
METER GLUCOSE: 182 MG/DL (ref 74–99)
MONOCYTES ABSOLUTE: 0.61 E9/L (ref 0.1–0.95)
MONOCYTES RELATIVE PERCENT: 8.9 % (ref 2–12)
NEUTROPHILS ABSOLUTE: 5.1 E9/L (ref 1.8–7.3)
NEUTROPHILS RELATIVE PERCENT: 74.5 % (ref 43–80)
PDW BLD-RTO: 14.3 FL (ref 11.5–15)
PLATELET # BLD: 140 E9/L (ref 130–450)
PMV BLD AUTO: 11 FL (ref 7–12)
RBC # BLD: 3 E12/L (ref 3.5–5.5)
WBC # BLD: 6.9 E9/L (ref 4.5–11.5)

## 2020-06-21 PROCEDURE — 99231 SBSQ HOSP IP/OBS SF/LOW 25: CPT | Performed by: FAMILY MEDICINE

## 2020-06-21 PROCEDURE — 6370000000 HC RX 637 (ALT 250 FOR IP): Performed by: ORTHOPAEDIC SURGERY

## 2020-06-21 PROCEDURE — 85025 COMPLETE CBC W/AUTO DIFF WBC: CPT

## 2020-06-21 PROCEDURE — 51702 INSERT TEMP BLADDER CATH: CPT

## 2020-06-21 PROCEDURE — 1200000000 HC SEMI PRIVATE

## 2020-06-21 PROCEDURE — 51798 US URINE CAPACITY MEASURE: CPT

## 2020-06-21 PROCEDURE — 97530 THERAPEUTIC ACTIVITIES: CPT

## 2020-06-21 PROCEDURE — 2580000003 HC RX 258: Performed by: ORTHOPAEDIC SURGERY

## 2020-06-21 PROCEDURE — 82962 GLUCOSE BLOOD TEST: CPT

## 2020-06-21 PROCEDURE — 51701 INSERT BLADDER CATHETER: CPT

## 2020-06-21 PROCEDURE — 6370000000 HC RX 637 (ALT 250 FOR IP): Performed by: FAMILY MEDICINE

## 2020-06-21 PROCEDURE — 36415 COLL VENOUS BLD VENIPUNCTURE: CPT

## 2020-06-21 PROCEDURE — 97110 THERAPEUTIC EXERCISES: CPT

## 2020-06-21 RX ADMIN — ACETAMINOPHEN 650 MG: 325 TABLET ORAL at 06:08

## 2020-06-21 RX ADMIN — ASPIRIN 81 MG: 81 TABLET, COATED ORAL at 22:37

## 2020-06-21 RX ADMIN — MEMANTINE HYDROCHLORIDE 10 MG: 10 TABLET, FILM COATED ORAL at 22:37

## 2020-06-21 RX ADMIN — LEVOTHYROXINE SODIUM 75 MCG: 75 TABLET ORAL at 06:08

## 2020-06-21 RX ADMIN — DOCUSATE SODIUM 100 MG: 100 CAPSULE, LIQUID FILLED ORAL at 10:16

## 2020-06-21 RX ADMIN — FENOFIBRATE 160 MG: 160 TABLET ORAL at 10:16

## 2020-06-21 RX ADMIN — SODIUM CHLORIDE, PRESERVATIVE FREE 10 ML: 5 INJECTION INTRAVENOUS at 10:25

## 2020-06-21 RX ADMIN — INSULIN LISPRO 2 UNITS: 100 INJECTION, SOLUTION INTRAVENOUS; SUBCUTANEOUS at 10:23

## 2020-06-21 RX ADMIN — PSYLLIUM HUSK 1 PACKET: 3.4 GRANULE ORAL at 10:15

## 2020-06-21 RX ADMIN — CHOLECALCIFEROL TAB 50 MCG (2000 UNIT) 1000 UNITS: 50 TAB at 10:18

## 2020-06-21 RX ADMIN — INSULIN LISPRO 1 UNITS: 100 INJECTION, SOLUTION INTRAVENOUS; SUBCUTANEOUS at 22:40

## 2020-06-21 RX ADMIN — DONEPEZIL HYDROCHLORIDE 10 MG: 5 TABLET, FILM COATED ORAL at 22:37

## 2020-06-21 ASSESSMENT — PAIN SCALES - PAIN ASSESSMENT IN ADVANCED DEMENTIA (PAINAD)
NEGVOCALIZATION: 1
TOTALSCORE: 4
BREATHING: 0
CONSOLABILITY: 1
BODYLANGUAGE: 1
FACIALEXPRESSION: 1

## 2020-06-21 ASSESSMENT — PAIN DESCRIPTION - ORIENTATION: ORIENTATION: LEFT

## 2020-06-21 ASSESSMENT — PAIN SCALES - GENERAL
PAINLEVEL_OUTOF10: 0
PAINLEVEL_OUTOF10: 0
PAINLEVEL_OUTOF10: 4
PAINLEVEL_OUTOF10: 0
PAINLEVEL_OUTOF10: 0
PAINLEVEL_OUTOF10: 3

## 2020-06-21 ASSESSMENT — PAIN DESCRIPTION - PROGRESSION
CLINICAL_PROGRESSION: RAPIDLY IMPROVING
CLINICAL_PROGRESSION: GRADUALLY WORSENING

## 2020-06-21 ASSESSMENT — PAIN - FUNCTIONAL ASSESSMENT
PAIN_FUNCTIONAL_ASSESSMENT: ACTIVITIES ARE NOT PREVENTED
PAIN_FUNCTIONAL_ASSESSMENT: PREVENTS OR INTERFERES SOME ACTIVE ACTIVITIES AND ADLS

## 2020-06-21 ASSESSMENT — PAIN DESCRIPTION - DESCRIPTORS: DESCRIPTORS: SORE;TENDER

## 2020-06-21 ASSESSMENT — PAIN DESCRIPTION - LOCATION: LOCATION: HIP

## 2020-06-21 ASSESSMENT — PAIN DESCRIPTION - PAIN TYPE: TYPE: SURGICAL PAIN

## 2020-06-21 NOTE — PROGRESS NOTES
Pt awaken confused alert to name only. Reoriented pt to room and unit. Pt calm able to take oral medications. Removed Both Syed hose and assessed heels for slight redness applied TEDS and put on heel protectors . Repositioned pt in bed. Bed alarm on. Assessed buttocks for skin tear in crack of buttocks applied Mepilex per skin protocol. Hourly rounds continued.

## 2020-06-21 NOTE — PROGRESS NOTES
Physical Therapy    Facility/Department: [address] ORTHO SURGERY  Treatment note    NAME: Gill Parrish  : 1935  MRN: [id number]    Date of Service: 2020      Attending Provider:  Jg Le MD    Evaluating PT:  Zulema Abad, P.T. Room #:  8895/6656-Q  Diagnosis:  L hip fx due to fall at home  Pertinent PMHx/PSHx:  dementia  Procedure/Surgery:  20 ORIF L hip with IM nail  Precautions:  WBAT LLE, falls, bed/chair alarm if no person is present with pt. Equipment Needs:  Wheeled walker    SUBJECTIVE:    Pt lives with daughter in a 1 story home with 2 stairs and 1 rail to enter. Pt ambulated with no AD PTA. OBJECTIVE:   Initial Evaluation  Date: 20 Treatment  2020 Short Term/ Long Term   Goals   Was pt agreeable to Eval/treatment? yes yes    Does pt have pain? No c/o pain at rest, but had increased L hip pain with moving and standing L hip pain during gait, non specific to severity    Bed Mobility  Rolling: MAX A  Supine to sit: MAX A  Sit to supine: NA  Scooting: MAX A Rolling: NT  Supine to sit: Max A  Scooting: Max A to EOB Supervision   Transfers Sit to stand: MAX A x2  Stand to sit: MAX A x2  Stand pivot: MOD A x2 with ww Sit to stand: Max A  Stand to sit: Max A  Stand Pivot;  Mod A using [employer] for support SBA   Ambulation   3 feet with ww MOD A x2 30 feet x 1 using WW for support Min/Mod A for balance 150 feet with ww SBA   Stair negotiation: ascended and descended NA NT 2 steps with 1 rail SBA   AM-PAC 6 Clicks         Balance: poor dynamic using [employer] for support    Pt performed therapeutic exercise of the following: NT  Patient education  Pt and Daughter was educated on transfers and gait    Patient response to education:   Pt verbalized understanding Pt demonstrated skill Pt requires further education in this area   yes With prompt for transfers yes     ASSESSMENT:   Comments: Pt found in bed, Daughter and Staff present, Pt states needs to use the bedside commode. Pt assisted to EOB, sat EOB Mod A for balance. Transfer bed to bedside commode using [employer] for support, Pt able to advance LEs but displays poor balance. Pt then given time to sit on the commode, was re approached later in AM for gait. Gait performed, praveena very slow, inconsistent and laboring, step to pattern used. Pt remains unsteady throughout, requires constant hand on assist for balance and safety, is presently unsafe to gait or transfer alone. Pt fatigued after activity. Much discussion with Daughter about home set up and equipment needs, states Pt to have a [employer], W/C, bedside commode for home use. Treatment: Pt practiced and was instructed in the following treatment: UE usage to assist with transfers, gait promoting posture and staying within [employer] base of support. Pt was left in a bedside chair with call light in reach, Daughter present    Time in 0922  Time out 0930  Time in: 0956  Time out: 1011   Total Treatment Time 23 minutes   CPT codes:     Therapeutic activities 96929 23 minutes   Therapeutic exercises 49583 0 minutes       Pt is making good progress toward established Physical Therapy goals as per increased functional mobility performed. Continue with physical therapy current plan of care.     Adrian Uribe PTA   License Number: [id number]

## 2020-06-22 VITALS
DIASTOLIC BLOOD PRESSURE: 65 MMHG | OXYGEN SATURATION: 98 % | SYSTOLIC BLOOD PRESSURE: 153 MMHG | RESPIRATION RATE: 16 BRPM | HEIGHT: 64 IN | BODY MASS INDEX: 28.51 KG/M2 | TEMPERATURE: 98.8 F | HEART RATE: 58 BPM | WEIGHT: 167 LBS

## 2020-06-22 LAB
METER GLUCOSE: 120 MG/DL (ref 74–99)
METER GLUCOSE: 130 MG/DL (ref 74–99)
URINE CULTURE, ROUTINE: NORMAL

## 2020-06-22 PROCEDURE — 97530 THERAPEUTIC ACTIVITIES: CPT

## 2020-06-22 PROCEDURE — 97535 SELF CARE MNGMENT TRAINING: CPT

## 2020-06-22 PROCEDURE — 82962 GLUCOSE BLOOD TEST: CPT

## 2020-06-22 PROCEDURE — 6370000000 HC RX 637 (ALT 250 FOR IP): Performed by: ORTHOPAEDIC SURGERY

## 2020-06-22 PROCEDURE — 6370000000 HC RX 637 (ALT 250 FOR IP): Performed by: FAMILY MEDICINE

## 2020-06-22 RX ORDER — PSEUDOEPHEDRINE HCL 30 MG
100 TABLET ORAL 2 TIMES DAILY
Qty: 30 CAPSULE | Refills: 3 | Status: ON HOLD | OUTPATIENT
Start: 2020-06-22 | End: 2021-01-01 | Stop reason: HOSPADM

## 2020-06-22 RX ORDER — FENOFIBRATE 134 MG/1
134 CAPSULE ORAL DAILY
Qty: 30 CAPSULE | Refills: 3 | Status: SHIPPED | OUTPATIENT
Start: 2020-06-22 | End: 2020-01-01

## 2020-06-22 RX ADMIN — HYDROCODONE BITARTRATE AND ACETAMINOPHEN 0.5 TABLET: 5; 325 TABLET ORAL at 05:43

## 2020-06-22 RX ADMIN — CHOLECALCIFEROL TAB 50 MCG (2000 UNIT) 1000 UNITS: 50 TAB at 10:57

## 2020-06-22 RX ADMIN — PSYLLIUM HUSK 1 PACKET: 3.4 GRANULE ORAL at 10:58

## 2020-06-22 RX ADMIN — FENOFIBRATE 160 MG: 160 TABLET ORAL at 10:56

## 2020-06-22 RX ADMIN — LEVOTHYROXINE SODIUM 75 MCG: 75 TABLET ORAL at 05:42

## 2020-06-22 RX ADMIN — DOCUSATE SODIUM 100 MG: 100 CAPSULE, LIQUID FILLED ORAL at 10:56

## 2020-06-22 ASSESSMENT — PAIN SCALES - PAIN ASSESSMENT IN ADVANCED DEMENTIA (PAINAD)
CONSOLABILITY: 0
BODYLANGUAGE: 1
BREATHING: 1
TOTALSCORE: 4
FACIALEXPRESSION: 1
NEGVOCALIZATION: 1

## 2020-06-22 ASSESSMENT — PAIN SCALES - GENERAL
PAINLEVEL_OUTOF10: 0
PAINLEVEL_OUTOF10: 0
PAINLEVEL_OUTOF10: 4

## 2020-06-22 ASSESSMENT — PAIN DESCRIPTION - FREQUENCY: FREQUENCY: INTERMITTENT

## 2020-06-22 ASSESSMENT — PAIN DESCRIPTION - PAIN TYPE: TYPE: SURGICAL PAIN

## 2020-06-22 ASSESSMENT — PAIN DESCRIPTION - PROGRESSION
CLINICAL_PROGRESSION: RAPIDLY IMPROVING
CLINICAL_PROGRESSION: GRADUALLY WORSENING

## 2020-06-22 ASSESSMENT — PAIN DESCRIPTION - LOCATION: LOCATION: HIP

## 2020-06-22 ASSESSMENT — PAIN DESCRIPTION - ONSET: ONSET: ON-GOING

## 2020-06-22 ASSESSMENT — PAIN DESCRIPTION - DESCRIPTORS: DESCRIPTORS: DISCOMFORT;SORE;TENDER

## 2020-06-22 ASSESSMENT — PAIN DESCRIPTION - ORIENTATION: ORIENTATION: LEFT

## 2020-06-22 NOTE — PROGRESS NOTES
weight shift to allow step of R LE  Mod A using ww in room  Min A during ADLs   Balance Sitting: fair/fair minus pending attention and level of pain  Sitting EOB maylin x7 min     Standing: poor at [employer]  Standing maylin x4 min Sitting: SBA     Standing: Mod A     Activity Tolerance Poor  significantly limited due to reports of pain in L LE Poor+  standing maylin x5-6 min with fair balance during self care tasks         B UE were [facility] during tasks. Treatment: Pt was initially seen for assistance with hygiene management. Due to breakfast tray arriving pt was seen again for ADLs this AM.  AE NT due to confusion and limited follow through ability to commands. Pt daughter present during tx. Educated daughter on techniques to relieve pressure sores, safety training and easy-slide to don jay hose. Provided pt with easy- side during tx. Per pt daughter they ordered a mechanical lift to facilitate tub transfers with pt. Pt daughter stated they were interested in [facility]. SW aware. Pt and family declined additional questions/ concerns for therapist.  Jesús Torres for hand placement provided during transfers. Education: Pressure relieving techniques, ADL retraining,easy- slide and fall prevention techniques. · Pt has made good progress towards set goals.    · Continue with current plan of care      Treatment Charges: Mins Units   Ther Ex  46681     Manual Therapy 17708     Thera Activities 55391 14 1   ADL/Home Mgt 50758 25 2   Neuro Re-ed 38040     Group Therapy      Orthotic manage/training  61152     Non-Billable Time     Total Timed Treatment 39 3       Constance Blake SANTOS/L [id number]

## 2020-06-22 NOTE — CARE COORDINATION
Social work / Discharge Planning:       CM updated social work that patient's DME will be delivered to her home around noon by [employer] [facility]. Social work arranged for [place] to  at [place].   CM provided phone number for Efrain Klinefelter (daughter will call and make payment by phone). CM updated [place]  that patient is being discharged.   Electronically signed by BUDDY Newby on 6/22/2020 at 12:21 PM

## 2020-06-22 NOTE — PROGRESS NOTES
Physical Therapy    Facility/Department: [facility] 2N ORTHO SURGERY  Treatment note    NAME: Indra Robert  : 1935  MRN: [id number]    Date of Service: 2020      Attending Provider:  Sandy Pino MD    Evaluating PT:  Rima Espinoza, P.T. Room #:  1332/7186-Z  Diagnosis:  L hip fx due to fall at home  Pertinent PMHx/PSHx:  dementia  Procedure/Surgery:  20 ORIF L hip with IM nail  Precautions:  WBAT LLE, falls, bed/chair alarm if no person is present with pt. Equipment Needs:  Wheeled walker    SUBJECTIVE:    Pt lives with daughter in a 1 story home with 2 stairs and 1 rail to enter. Pt ambulated with no AD PTA. OBJECTIVE:   Initial Evaluation  Date: 20 Treatment  2020 Short Term/ Long Term   Goals   Was pt agreeable to Eval/treatment? yes yes    Does pt have pain? No c/o pain at rest, but had increased L hip pain with moving and standing L hip pain during gait, non specific to severity    Bed Mobility  Rolling: MAX A  Supine to sit: MAX A  Sit to supine: NA  Scooting: MAX A Rolling: NT  Supine to sit: Max A  Scooting: Max A to EOB Supervision   Transfers Sit to stand: MAX A x2  Stand to sit: MAX A x2  Stand pivot: MOD A x2 with ww Sit to stand: Max A  Stand to sit: Max A  Stand Pivot; Mod A using [address] for support SBA   Ambulation   3 feet with ww MOD A x2 35 feet x 1 using [address] for support Min A for balance 150 feet with ww SBA   Stair negotiation: ascended and descended NA NT 2 steps with 1 rail SBA   AM-PAC 6 Clicks 63/35 38/58        Balance: poor dynamic using WW for support    Pt performed therapeutic exercise of the following: NT    Patient education  Pt and Daughter was educated on transfers and gait. Pt with many Daughters, this the 2rd different Daughter present for Rx.     Patient response to education:   Pt verbalized understanding Pt demonstrated skill Pt requires further education in this area   yes With prompt for transfers yes     ASSESSMENT:   Comments: Pt found in bed, Daughter present, Pt states needs to use the bedside commode. Pt assisted to EOB, sat EOB Min A for balance. Transfer bed to bedside commode using [employer] for support, Pt able to advance LEs but displays poor balance. Pt then given time to sit on the commode, was re approached later in AM for gait. Gait performed, praveena remains very slow, inconsistent and laboring, step to pattern used. Pt remains unsteady throughout, requires constant hand on assist for balance and safety, is presently unsafe to gait or transfer alone. Pt fatigued after activity. Treatment: Pt practiced and was instructed in the following treatment: UE usage to assist with transfers, gait promoting posture and staying within [employer] base of support. Pt was left in a bedside chair with call light in reach, Daughter and WILL present    Time in 0743  Time out 0754  Time in: 0956  Time out: 1010   Total Treatment Time 25 minutes   CPT codes:     Therapeutic activities 38316 25 minutes   Therapeutic exercises 39072 0 minutes       Pt is making good progress toward established Physical Therapy goals as per increased functional mobility performed and improved balance during gait, less assist required today. Continue with physical therapy current plan of care.     Chucho Mercury [facility]   License Number: [id number]

## 2020-08-04 ENCOUNTER — TELEPHONE (OUTPATIENT)
Dept: GERIATRIC MEDICINE | Age: 85
End: 2020-08-04

## 2020-09-04 ENCOUNTER — TELEPHONE (OUTPATIENT)
Dept: GERIATRIC MEDICINE | Age: 85
End: 2020-09-04

## 2020-09-04 NOTE — TELEPHONE ENCOUNTER
Dalila Garcia is declining.  Spoke with daughter and they will increase Aricept to 15 mg and in 2 weeks will increase Namenda to 15 mg a day  They will call us with updates

## 2020-09-04 NOTE — TELEPHONE ENCOUNTER
Pt daughter called states that moms second med is no longer working, They have noticed changes.  Please call daughter

## 2020-10-27 NOTE — PROGRESS NOTES
Here for follow up  Here with [place] daughter  And recent wedding of grandson in [facility] she attended  ADL returning with PT and OT after   Fx hip in June and getting better on walker   Living with daughter in [place]  IADL's per family  And 6 daughters and share with her care  4/6 6 point fix and missed Month and date  GREGORIO MILAN  41 cents  ATP and 2/3   Clock   Animals 5/2  Remains on Namenda 15 mg and Namenda 15 mg a day  Last week a bad week with more confusion  Goes to [place] and forgets process of mictuition  Impression: SDAT and worsening                       From last visit and hx of Sx                       S/P Hip Fx on walker                       Brain confused from wedding  Plan: Continue same and if no better             Consider increase of Aricept 20 mg a day             And or Namenda 20 mg a day

## 2020-10-27 NOTE — PROGRESS NOTES
Chief Complaint   Patient presents with   Rafal Spina Dementia     April follow up.  Blood Pressure Check     162/86. Repeat 158/82.  Flu Vaccine     Pt states she does not take flu vaccine.

## 2021-01-01 ENCOUNTER — TELEPHONE (OUTPATIENT)
Dept: GERIATRIC MEDICINE | Age: 86
End: 2021-01-01

## 2021-01-01 ENCOUNTER — TELEPHONE (OUTPATIENT)
Dept: FAMILY MEDICINE CLINIC | Age: 86
End: 2021-01-01

## 2021-01-01 ENCOUNTER — ANESTHESIA EVENT (OUTPATIENT)
Dept: OPERATING ROOM | Age: 86
DRG: 329 | End: 2021-01-01
Payer: MEDICARE

## 2021-01-01 ENCOUNTER — APPOINTMENT (OUTPATIENT)
Dept: ULTRASOUND IMAGING | Age: 86
End: 2021-01-01
Payer: MEDICARE

## 2021-01-01 ENCOUNTER — OFFICE VISIT (OUTPATIENT)
Dept: SURGERY | Age: 86
End: 2021-01-01
Payer: MEDICARE

## 2021-01-01 ENCOUNTER — TELEPHONE (OUTPATIENT)
Dept: SURGERY | Age: 86
End: 2021-01-01

## 2021-01-01 ENCOUNTER — APPOINTMENT (OUTPATIENT)
Dept: CT IMAGING | Age: 86
DRG: 329 | End: 2021-01-01
Payer: MEDICARE

## 2021-01-01 ENCOUNTER — HOSPITAL ENCOUNTER (EMERGENCY)
Age: 86
Discharge: HOME | End: 2021-07-22
Attending: EMERGENCY MEDICINE
Payer: MEDICARE

## 2021-01-01 ENCOUNTER — HOSPITAL ENCOUNTER (INPATIENT)
Age: 86
LOS: 8 days | Discharge: HOME | DRG: 329 | End: 2021-07-13
Attending: EMERGENCY MEDICINE | Admitting: FAMILY MEDICINE
Payer: MEDICARE

## 2021-01-01 ENCOUNTER — HOSPITAL ENCOUNTER (OUTPATIENT)
Dept: WOUND CARE | Age: 86
Discharge: HOME | End: 2021-08-19
Payer: MEDICARE

## 2021-01-01 ENCOUNTER — HOSPITAL ENCOUNTER (EMERGENCY)
Age: 86
Discharge: HOME | End: 2021-06-23
Attending: EMERGENCY MEDICINE
Payer: MEDICARE

## 2021-01-01 ENCOUNTER — HOSPITAL ENCOUNTER (EMERGENCY)
Age: 86
Discharge: HOME | End: 2021-04-23
Attending: EMERGENCY MEDICINE
Payer: MEDICARE

## 2021-01-01 ENCOUNTER — APPOINTMENT (OUTPATIENT)
Dept: CT IMAGING | Age: 86
End: 2021-01-01
Payer: MEDICARE

## 2021-01-01 ENCOUNTER — APPOINTMENT (OUTPATIENT)
Dept: GENERAL RADIOLOGY | Age: 86
End: 2021-01-01
Payer: MEDICARE

## 2021-01-01 ENCOUNTER — OFFICE VISIT (OUTPATIENT)
Dept: GERIATRIC MEDICINE | Age: 86
End: 2021-01-01
Payer: MEDICARE

## 2021-01-01 ENCOUNTER — ANESTHESIA (OUTPATIENT)
Dept: OPERATING ROOM | Age: 86
DRG: 329 | End: 2021-01-01
Payer: MEDICARE

## 2021-01-01 ENCOUNTER — TELEPHONE (OUTPATIENT)
Dept: PALLATIVE CARE | Age: 86
End: 2021-01-01

## 2021-01-01 ENCOUNTER — APPOINTMENT (OUTPATIENT)
Dept: GENERAL RADIOLOGY | Age: 86
DRG: 329 | End: 2021-01-01
Payer: MEDICARE

## 2021-01-01 VITALS
RESPIRATION RATE: 18 BRPM | HEIGHT: 64 IN | TEMPERATURE: 98.2 F | WEIGHT: 163 LBS | DIASTOLIC BLOOD PRESSURE: 96 MMHG | BODY MASS INDEX: 27.83 KG/M2 | SYSTOLIC BLOOD PRESSURE: 168 MMHG | HEART RATE: 67 BPM | OXYGEN SATURATION: 96 %

## 2021-01-01 VITALS
HEIGHT: 65 IN | BODY MASS INDEX: 28.32 KG/M2 | SYSTOLIC BLOOD PRESSURE: 113 MMHG | WEIGHT: 170 LBS | RESPIRATION RATE: 16 BRPM | DIASTOLIC BLOOD PRESSURE: 63 MMHG | HEART RATE: 87 BPM | OXYGEN SATURATION: 93 % | TEMPERATURE: 96.9 F

## 2021-01-01 VITALS
DIASTOLIC BLOOD PRESSURE: 72 MMHG | SYSTOLIC BLOOD PRESSURE: 170 MMHG | OXYGEN SATURATION: 95 % | HEIGHT: 64 IN | RESPIRATION RATE: 16 BRPM | BODY MASS INDEX: 28.17 KG/M2 | HEART RATE: 77 BPM | WEIGHT: 165 LBS | TEMPERATURE: 98 F

## 2021-01-01 VITALS
HEART RATE: 93 BPM | OXYGEN SATURATION: 97 % | SYSTOLIC BLOOD PRESSURE: 130 MMHG | RESPIRATION RATE: 16 BRPM | BODY MASS INDEX: 27.82 KG/M2 | DIASTOLIC BLOOD PRESSURE: 69 MMHG | WEIGHT: 157 LBS | HEIGHT: 63 IN | TEMPERATURE: 97.8 F

## 2021-01-01 VITALS — OXYGEN SATURATION: 100 % | TEMPERATURE: 97.7 F | SYSTOLIC BLOOD PRESSURE: 156 MMHG | DIASTOLIC BLOOD PRESSURE: 63 MMHG

## 2021-01-01 VITALS
HEART RATE: 80 BPM | SYSTOLIC BLOOD PRESSURE: 151 MMHG | DIASTOLIC BLOOD PRESSURE: 65 MMHG | TEMPERATURE: 98.6 F | BODY MASS INDEX: 26.68 KG/M2 | OXYGEN SATURATION: 98 % | HEIGHT: 67 IN | WEIGHT: 170 LBS | RESPIRATION RATE: 16 BRPM

## 2021-01-01 VITALS
SYSTOLIC BLOOD PRESSURE: 130 MMHG | WEIGHT: 163.3 LBS | DIASTOLIC BLOOD PRESSURE: 70 MMHG | BODY MASS INDEX: 27.88 KG/M2 | RESPIRATION RATE: 16 BRPM | HEIGHT: 64 IN | HEART RATE: 64 BPM | TEMPERATURE: 96.8 F

## 2021-01-01 DIAGNOSIS — G30.1 LATE ONSET ALZHEIMER'S DISEASE WITHOUT BEHAVIORAL DISTURBANCE (HCC): Primary | ICD-10-CM

## 2021-01-01 DIAGNOSIS — R07.89 MUSCULOSKELETAL CHEST PAIN: Primary | ICD-10-CM
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
## 2021-01-01 DIAGNOSIS — F02.80 LATE ONSET ALZHEIMER'S DISEASE WITHOUT BEHAVIORAL DISTURBANCE (HCC): Primary | ICD-10-CM

## 2021-01-01 DIAGNOSIS — L03.114 CELLULITIS OF LEFT UPPER EXTREMITY: Primary | ICD-10-CM

## 2021-01-01 DIAGNOSIS — J96.01 ACUTE RESPIRATORY FAILURE WITH HYPOXIA (HCC): Primary | ICD-10-CM

## 2021-01-01 DIAGNOSIS — Z90.49 S/P PARTIAL RESECTION OF COLON: Primary | ICD-10-CM

## 2021-01-01 DIAGNOSIS — R06.02 SHORTNESS OF BREATH: Primary | ICD-10-CM

## 2021-01-01 DIAGNOSIS — R53.83 OTHER FATIGUE: ICD-10-CM

## 2021-01-01 DIAGNOSIS — G93.40 ACUTE ENCEPHALOPATHY: ICD-10-CM

## 2021-01-01 LAB
ABO/RH: NORMAL
ALBUMIN SERPL-MCNC: 3.4 G/DL (ref 3.5–5.2)
ALBUMIN SERPL-MCNC: 3.5 G/DL (ref 3.5–5.2)
ALP BLD-CCNC: 82 U/L (ref 35–104)
ALP BLD-CCNC: 88 U/L (ref 35–104)
ALT SERPL-CCNC: 7 U/L (ref 0–32)
ALT SERPL-CCNC: 9 U/L (ref 0–32)
ANION GAP SERPL CALCULATED.3IONS-SCNC: 10 MMOL/L (ref 7–16)
ANION GAP SERPL CALCULATED.3IONS-SCNC: 12 MMOL/L (ref 7–16)
ANION GAP SERPL CALCULATED.3IONS-SCNC: 7 MMOL/L (ref 7–16)
ANION GAP SERPL CALCULATED.3IONS-SCNC: 8 MMOL/L (ref 7–16)
ANION GAP SERPL CALCULATED.3IONS-SCNC: 9 MMOL/L (ref 7–16)
ANISOCYTOSIS: ABNORMAL
ANTIBODY SCREEN: NORMAL
AST SERPL-CCNC: 15 U/L (ref 0–31)
AST SERPL-CCNC: 16 U/L (ref 0–31)
B.E.: 1.6 MMOL/L (ref -3–3)
BACTERIA: ABNORMAL /HPF
BASOPHILIC STIPPLING: ABNORMAL
BASOPHILS ABSOLUTE: 0.01 E9/L (ref 0–0.2)
BASOPHILS ABSOLUTE: 0.02 E9/L (ref 0–0.2)
BASOPHILS ABSOLUTE: 0.02 E9/L (ref 0–0.2)
BASOPHILS ABSOLUTE: 0.03 E9/L (ref 0–0.2)
BASOPHILS ABSOLUTE: 0.13 E9/L (ref 0–0.2)
BASOPHILS RELATIVE PERCENT: 0.1 % (ref 0–2)
BASOPHILS RELATIVE PERCENT: 0.2 % (ref 0–2)
BASOPHILS RELATIVE PERCENT: 0.3 % (ref 0–2)
BASOPHILS RELATIVE PERCENT: 0.3 % (ref 0–2)
BASOPHILS RELATIVE PERCENT: 0.4 % (ref 0–2)
BASOPHILS RELATIVE PERCENT: 0.9 % (ref 0–2)
BETA-HYDROXYBUTYRATE: 0.32 MMOL/L (ref 0.02–0.27)
BILIRUB SERPL-MCNC: 0.3 MG/DL (ref 0–1.2)
BILIRUB SERPL-MCNC: <0.2 MG/DL (ref 0–1.2)
BILIRUBIN DIRECT: <0.2 MG/DL (ref 0–0.3)
BILIRUBIN URINE: ABNORMAL
BILIRUBIN URINE: NEGATIVE
BILIRUBIN, INDIRECT: NORMAL MG/DL (ref 0–1)
BLOOD BANK DISPENSE STATUS: NORMAL
BLOOD BANK PRODUCT CODE: NORMAL
BLOOD CULTURE, ROUTINE: NORMAL
BLOOD, URINE: ABNORMAL
BLOOD, URINE: NEGATIVE
BPU ID: NORMAL
BUN BLDV-MCNC: 10 MG/DL (ref 6–23)
BUN BLDV-MCNC: 12 MG/DL (ref 6–23)
BUN BLDV-MCNC: 18 MG/DL (ref 6–23)
BUN BLDV-MCNC: 19 MG/DL (ref 6–23)
BUN BLDV-MCNC: 20 MG/DL (ref 6–23)
BUN BLDV-MCNC: 23 MG/DL (ref 6–23)
BUN BLDV-MCNC: 24 MG/DL (ref 6–23)
BUN BLDV-MCNC: 8 MG/DL (ref 6–23)
CALCIUM SERPL-MCNC: 8.2 MG/DL (ref 8.6–10.2)
CALCIUM SERPL-MCNC: 8.2 MG/DL (ref 8.6–10.2)
CALCIUM SERPL-MCNC: 8.3 MG/DL (ref 8.6–10.2)
CALCIUM SERPL-MCNC: 8.3 MG/DL (ref 8.6–10.2)
CALCIUM SERPL-MCNC: 8.4 MG/DL (ref 8.6–10.2)
CALCIUM SERPL-MCNC: 8.9 MG/DL (ref 8.6–10.2)
CALCIUM SERPL-MCNC: 9.3 MG/DL (ref 8.6–10.2)
CALCIUM SERPL-MCNC: 9.4 MG/DL (ref 8.6–10.2)
CEA: 205.1 NG/ML (ref 0–5.2)
CHLORIDE BLD-SCNC: 103 MMOL/L (ref 98–107)
CHLORIDE BLD-SCNC: 104 MMOL/L (ref 98–107)
CHLORIDE BLD-SCNC: 106 MMOL/L (ref 98–107)
CHLORIDE BLD-SCNC: 107 MMOL/L (ref 98–107)
CHLORIDE BLD-SCNC: 109 MMOL/L (ref 98–107)
CHLORIDE BLD-SCNC: 99 MMOL/L (ref 98–107)
CLARITY: CLEAR
CLARITY: CLEAR
CO2: 22 MMOL/L (ref 22–29)
CO2: 23 MMOL/L (ref 22–29)
CO2: 25 MMOL/L (ref 22–29)
CO2: 27 MMOL/L (ref 22–29)
CO2: 29 MMOL/L (ref 22–29)
COHB: 1.9 % (ref 0–1.5)
COLOR: YELLOW
COLOR: YELLOW
CREAT SERPL-MCNC: 0.8 MG/DL (ref 0.5–1)
CREAT SERPL-MCNC: 0.8 MG/DL (ref 0.5–1)
CREAT SERPL-MCNC: 0.9 MG/DL (ref 0.5–1)
CREAT SERPL-MCNC: 1 MG/DL (ref 0.5–1)
CREAT SERPL-MCNC: 1.1 MG/DL (ref 0.5–1)
CREAT SERPL-MCNC: 1.1 MG/DL (ref 0.5–1)
CREAT SERPL-MCNC: 1.2 MG/DL (ref 0.5–1)
CREAT SERPL-MCNC: 1.3 MG/DL (ref 0.5–1)
CRITICAL: ABNORMAL
CULTURE, BLOOD 2: NORMAL
DATE ANALYZED: ABNORMAL
DATE OF COLLECTION: ABNORMAL
DESCRIPTION BLOOD BANK: NORMAL
EKG ATRIAL RATE: 72 BPM
EKG ATRIAL RATE: 74 BPM
EKG ATRIAL RATE: 84 BPM
EKG P AXIS: 24 DEGREES
EKG P AXIS: 36 DEGREES
EKG P AXIS: 38 DEGREES
EKG P-R INTERVAL: 172 MS
EKG P-R INTERVAL: 174 MS
EKG P-R INTERVAL: 188 MS
EKG Q-T INTERVAL: 366 MS
EKG Q-T INTERVAL: 380 MS
EKG Q-T INTERVAL: 392 MS
EKG QRS DURATION: 90 MS
EKG QRS DURATION: 92 MS
EKG QRS DURATION: 94 MS
EKG QTC CALCULATION (BAZETT): 400 MS
EKG QTC CALCULATION (BAZETT): 435 MS
EKG QTC CALCULATION (BAZETT): 449 MS
EKG R AXIS: -16 DEGREES
EKG R AXIS: -18 DEGREES
EKG R AXIS: -27 DEGREES
EKG T AXIS: 31 DEGREES
EKG T AXIS: 33 DEGREES
EKG T AXIS: 57 DEGREES
EKG VENTRICULAR RATE: 72 BPM
EKG VENTRICULAR RATE: 74 BPM
EKG VENTRICULAR RATE: 84 BPM
EOSINOPHILS ABSOLUTE: 0 E9/L (ref 0.05–0.5)
EOSINOPHILS ABSOLUTE: 0.08 E9/L (ref 0.05–0.5)
EOSINOPHILS ABSOLUTE: 0.08 E9/L (ref 0.05–0.5)
EOSINOPHILS ABSOLUTE: 0.1 E9/L (ref 0.05–0.5)
EOSINOPHILS ABSOLUTE: 0.1 E9/L (ref 0.05–0.5)
EOSINOPHILS ABSOLUTE: 0.2 E9/L (ref 0.05–0.5)
EOSINOPHILS ABSOLUTE: 0.22 E9/L (ref 0.05–0.5)
EOSINOPHILS RELATIVE PERCENT: 0 % (ref 0–6)
EOSINOPHILS RELATIVE PERCENT: 1.1 % (ref 0–6)
EOSINOPHILS RELATIVE PERCENT: 1.3 % (ref 0–6)
EOSINOPHILS RELATIVE PERCENT: 1.3 % (ref 0–6)
EOSINOPHILS RELATIVE PERCENT: 1.4 % (ref 0–6)
EOSINOPHILS RELATIVE PERCENT: 2.5 % (ref 0–6)
EOSINOPHILS RELATIVE PERCENT: 2.8 % (ref 0–6)
FERRITIN: 53 NG/ML
GFR AFRICAN AMERICAN: 47
GFR AFRICAN AMERICAN: 52
GFR AFRICAN AMERICAN: 57
GFR AFRICAN AMERICAN: 57
GFR AFRICAN AMERICAN: >60
GFR NON-AFRICAN AMERICAN: 39 ML/MIN/1.73
GFR NON-AFRICAN AMERICAN: 43 ML/MIN/1.73
GFR NON-AFRICAN AMERICAN: 47 ML/MIN/1.73
GFR NON-AFRICAN AMERICAN: 47 ML/MIN/1.73
GFR NON-AFRICAN AMERICAN: 53 ML/MIN/1.73
GFR NON-AFRICAN AMERICAN: 60 ML/MIN/1.73
GFR NON-AFRICAN AMERICAN: >60 ML/MIN/1.73
GFR NON-AFRICAN AMERICAN: >60 ML/MIN/1.73
GLUCOSE BLD-MCNC: 116 MG/DL (ref 74–99)
GLUCOSE BLD-MCNC: 126 MG/DL (ref 74–99)
GLUCOSE BLD-MCNC: 162 MG/DL (ref 74–99)
GLUCOSE BLD-MCNC: 164 MG/DL (ref 74–99)
GLUCOSE BLD-MCNC: 83 MG/DL (ref 74–99)
GLUCOSE BLD-MCNC: 85 MG/DL (ref 74–99)
GLUCOSE BLD-MCNC: 96 MG/DL (ref 74–99)
GLUCOSE BLD-MCNC: 97 MG/DL (ref 74–99)
GLUCOSE URINE: NEGATIVE MG/DL
GLUCOSE URINE: NEGATIVE MG/DL
HCO3: 24.6 MMOL/L (ref 22–26)
HCT VFR BLD CALC: 22.6 % (ref 34–48)
HCT VFR BLD CALC: 26.1 % (ref 34–48)
HCT VFR BLD CALC: 27.1 % (ref 34–48)
HCT VFR BLD CALC: 27.1 % (ref 34–48)
HCT VFR BLD CALC: 27.4 % (ref 34–48)
HCT VFR BLD CALC: 28.5 % (ref 34–48)
HCT VFR BLD CALC: 30.2 % (ref 34–48)
HCT VFR BLD CALC: 30.5 % (ref 34–48)
HCT VFR BLD CALC: 31.5 % (ref 34–48)
HCT VFR BLD CALC: 32.8 % (ref 34–48)
HEMOGLOBIN: 6.6 G/DL (ref 11.5–15.5)
HEMOGLOBIN: 7.8 G/DL (ref 11.5–15.5)
HEMOGLOBIN: 8.2 G/DL (ref 11.5–15.5)
HEMOGLOBIN: 8.5 G/DL (ref 11.5–15.5)
HEMOGLOBIN: 8.5 G/DL (ref 11.5–15.5)
HEMOGLOBIN: 8.7 G/DL (ref 11.5–15.5)
HEMOGLOBIN: 9.2 G/DL (ref 11.5–15.5)
HEMOGLOBIN: 9.3 G/DL (ref 11.5–15.5)
HEMOGLOBIN: 9.5 G/DL (ref 11.5–15.5)
HEMOGLOBIN: 9.9 G/DL (ref 11.5–15.5)
HHB: 3.3 % (ref 0–5)
HYPOCHROMIA: ABNORMAL
IMMATURE GRANULOCYTES #: 0.03 E9/L
IMMATURE GRANULOCYTES #: 0.04 E9/L
IMMATURE GRANULOCYTES #: 0.09 E9/L
IMMATURE GRANULOCYTES #: 0.1 E9/L
IMMATURE GRANULOCYTES #: 0.11 E9/L
IMMATURE GRANULOCYTES #: 0.12 E9/L
IMMATURE GRANULOCYTES %: 0.4 % (ref 0–5)
IMMATURE GRANULOCYTES %: 0.4 % (ref 0–5)
IMMATURE GRANULOCYTES %: 0.5 % (ref 0–5)
IMMATURE GRANULOCYTES %: 0.5 % (ref 0–5)
IMMATURE GRANULOCYTES %: 0.8 % (ref 0–5)
IMMATURE GRANULOCYTES %: 1.1 % (ref 0–5)
IMMATURE GRANULOCYTES %: 1.2 % (ref 0–5)
IMMATURE GRANULOCYTES %: 1.3 % (ref 0–5)
INR BLD: 1.3
IRON SATURATION: 9 % (ref 15–50)
IRON: 19 MCG/DL (ref 37–145)
KETONES, URINE: 15 MG/DL
KETONES, URINE: NEGATIVE MG/DL
LAB: ABNORMAL
LACTIC ACID: 1.2 MMOL/L (ref 0.5–2.2)
LEUKOCYTE ESTERASE, URINE: ABNORMAL
LEUKOCYTE ESTERASE, URINE: NEGATIVE
LV EF: 60 %
LVEF MODALITY: NORMAL
LYMPHOCYTES ABSOLUTE: 0 E9/L (ref 1.5–4)
LYMPHOCYTES ABSOLUTE: 0.62 E9/L (ref 1.5–4)
LYMPHOCYTES ABSOLUTE: 0.67 E9/L (ref 1.5–4)
LYMPHOCYTES ABSOLUTE: 0.68 E9/L (ref 1.5–4)
LYMPHOCYTES ABSOLUTE: 0.71 E9/L (ref 1.5–4)
LYMPHOCYTES ABSOLUTE: 0.73 E9/L (ref 1.5–4)
LYMPHOCYTES ABSOLUTE: 0.74 E9/L (ref 1.5–4)
LYMPHOCYTES ABSOLUTE: 0.74 E9/L (ref 1.5–4)
LYMPHOCYTES ABSOLUTE: 0.78 E9/L (ref 1.5–4)
LYMPHOCYTES RELATIVE PERCENT: 11.3 % (ref 20–42)
LYMPHOCYTES RELATIVE PERCENT: 12.4 % (ref 20–42)
LYMPHOCYTES RELATIVE PERCENT: 4 % (ref 20–42)
LYMPHOCYTES RELATIVE PERCENT: 5.1 % (ref 20–42)
LYMPHOCYTES RELATIVE PERCENT: 6.8 % (ref 20–42)
LYMPHOCYTES RELATIVE PERCENT: 8.5 % (ref 20–42)
LYMPHOCYTES RELATIVE PERCENT: 8.6 % (ref 20–42)
LYMPHOCYTES RELATIVE PERCENT: 9 % (ref 20–42)
LYMPHOCYTES RELATIVE PERCENT: 9.1 % (ref 20–42)
Lab: ABNORMAL
MAGNESIUM: 1.4 MG/DL (ref 1.6–2.6)
MAGNESIUM: 1.4 MG/DL (ref 1.6–2.6)
MAGNESIUM: 2 MG/DL (ref 1.6–2.6)
MCH RBC QN AUTO: 26 PG (ref 26–35)
MCH RBC QN AUTO: 26.5 PG (ref 26–35)
MCH RBC QN AUTO: 26.6 PG (ref 26–35)
MCH RBC QN AUTO: 26.6 PG (ref 26–35)
MCH RBC QN AUTO: 27 PG (ref 26–35)
MCH RBC QN AUTO: 27.2 PG (ref 26–35)
MCH RBC QN AUTO: 27.7 PG (ref 26–35)
MCH RBC QN AUTO: 28.1 PG (ref 26–35)
MCH RBC QN AUTO: 28.4 PG (ref 26–35)
MCHC RBC AUTO-ENTMCNC: 29.2 % (ref 32–34.5)
MCHC RBC AUTO-ENTMCNC: 29.9 % (ref 32–34.5)
MCHC RBC AUTO-ENTMCNC: 30.2 % (ref 32–34.5)
MCHC RBC AUTO-ENTMCNC: 30.3 % (ref 32–34.5)
MCHC RBC AUTO-ENTMCNC: 30.5 % (ref 32–34.5)
MCHC RBC AUTO-ENTMCNC: 30.8 % (ref 32–34.5)
MCHC RBC AUTO-ENTMCNC: 31.4 % (ref 32–34.5)
MCV RBC AUTO: 87.2 FL (ref 80–99.9)
MCV RBC AUTO: 87.5 FL (ref 80–99.9)
MCV RBC AUTO: 88.2 FL (ref 80–99.9)
MCV RBC AUTO: 88.3 FL (ref 80–99.9)
MCV RBC AUTO: 88.8 FL (ref 80–99.9)
MCV RBC AUTO: 89 FL (ref 80–99.9)
MCV RBC AUTO: 89.7 FL (ref 80–99.9)
MCV RBC AUTO: 93.3 FL (ref 80–99.9)
MCV RBC AUTO: 94 FL (ref 80–99.9)
METER GLUCOSE: 103 MG/DL (ref 74–99)
METER GLUCOSE: 108 MG/DL (ref 74–99)
METER GLUCOSE: 109 MG/DL (ref 74–99)
METER GLUCOSE: 117 MG/DL (ref 74–99)
METER GLUCOSE: 120 MG/DL (ref 74–99)
METER GLUCOSE: 127 MG/DL (ref 74–99)
METER GLUCOSE: 132 MG/DL (ref 74–99)
METER GLUCOSE: 156 MG/DL (ref 74–99)
METER GLUCOSE: 161 MG/DL (ref 74–99)
METER GLUCOSE: 164 MG/DL (ref 74–99)
METER GLUCOSE: 170 MG/DL (ref 74–99)
METER GLUCOSE: 69 MG/DL (ref 74–99)
METER GLUCOSE: 96 MG/DL (ref 74–99)
METER GLUCOSE: 97 MG/DL (ref 74–99)
METER GLUCOSE: 98 MG/DL (ref 74–99)
METHB: 0.3 % (ref 0–1.5)
MODE: ABNORMAL
MONOCYTES ABSOLUTE: 0.28 E9/L (ref 0.1–0.95)
MONOCYTES ABSOLUTE: 0.37 E9/L (ref 0.1–0.95)
MONOCYTES ABSOLUTE: 0.41 E9/L (ref 0.1–0.95)
MONOCYTES ABSOLUTE: 0.44 E9/L (ref 0.1–0.95)
MONOCYTES ABSOLUTE: 0.46 E9/L (ref 0.1–0.95)
MONOCYTES ABSOLUTE: 0.46 E9/L (ref 0.1–0.95)
MONOCYTES ABSOLUTE: 0.55 E9/L (ref 0.1–0.95)
MONOCYTES ABSOLUTE: 0.64 E9/L (ref 0.1–0.95)
MONOCYTES ABSOLUTE: 0.96 E9/L (ref 0.1–0.95)
MONOCYTES RELATIVE PERCENT: 1.7 % (ref 2–12)
MONOCYTES RELATIVE PERCENT: 4.5 % (ref 2–12)
MONOCYTES RELATIVE PERCENT: 5.8 % (ref 2–12)
MONOCYTES RELATIVE PERCENT: 5.9 % (ref 2–12)
MONOCYTES RELATIVE PERCENT: 6 % (ref 2–12)
MONOCYTES RELATIVE PERCENT: 6.6 % (ref 2–12)
MONOCYTES RELATIVE PERCENT: 6.9 % (ref 2–12)
MONOCYTES RELATIVE PERCENT: 7 % (ref 2–12)
MONOCYTES RELATIVE PERCENT: 7.3 % (ref 2–12)
NEUTROPHILS ABSOLUTE: 12.72 E9/L (ref 1.8–7.3)
NEUTROPHILS ABSOLUTE: 13.48 E9/L (ref 1.8–7.3)
NEUTROPHILS ABSOLUTE: 4.63 E9/L (ref 1.8–7.3)
NEUTROPHILS ABSOLUTE: 4.99 E9/L (ref 1.8–7.3)
NEUTROPHILS ABSOLUTE: 6.22 E9/L (ref 1.8–7.3)
NEUTROPHILS ABSOLUTE: 6.41 E9/L (ref 1.8–7.3)
NEUTROPHILS ABSOLUTE: 6.71 E9/L (ref 1.8–7.3)
NEUTROPHILS ABSOLUTE: 7.67 E9/L (ref 1.8–7.3)
NEUTROPHILS ABSOLUTE: 7.83 E9/L (ref 1.8–7.3)
NEUTROPHILS RELATIVE PERCENT: 78.5 % (ref 43–80)
NEUTROPHILS RELATIVE PERCENT: 79.4 % (ref 43–80)
NEUTROPHILS RELATIVE PERCENT: 81.6 % (ref 43–80)
NEUTROPHILS RELATIVE PERCENT: 82.3 % (ref 43–80)
NEUTROPHILS RELATIVE PERCENT: 83 % (ref 43–80)
NEUTROPHILS RELATIVE PERCENT: 83.1 % (ref 43–80)
NEUTROPHILS RELATIVE PERCENT: 85.7 % (ref 43–80)
NEUTROPHILS RELATIVE PERCENT: 87.4 % (ref 43–80)
NEUTROPHILS RELATIVE PERCENT: 97.4 % (ref 43–80)
NITRITE, URINE: NEGATIVE
NITRITE, URINE: NEGATIVE
O2 CONTENT: 11.4 ML/DL
O2 SATURATION: 96.6 % (ref 92–98.5)
O2HB: 94.5 % (ref 94–97)
OPERATOR ID: ABNORMAL
ORGANISM: ABNORMAL
OVALOCYTES: ABNORMAL
PATIENT TEMP: 37 C
PCO2: 32.4 MMHG (ref 35–45)
PDW BLD-RTO: 14.7 FL (ref 11.5–15)
PDW BLD-RTO: 15 FL (ref 11.5–15)
PDW BLD-RTO: 15.9 FL (ref 11.5–15)
PDW BLD-RTO: 15.9 FL (ref 11.5–15)
PDW BLD-RTO: 16 FL (ref 11.5–15)
PDW BLD-RTO: 16 FL (ref 11.5–15)
PDW BLD-RTO: 16.5 FL (ref 11.5–15)
PDW BLD-RTO: 17.1 FL (ref 11.5–15)
PDW BLD-RTO: 17.5 FL (ref 11.5–15)
PH BLOOD GAS: 7.5 (ref 7.35–7.45)
PH UA: 6.5 (ref 5–9)
PH UA: 7 (ref 5–9)
PLATELET # BLD: 228 E9/L (ref 130–450)
PLATELET # BLD: 282 E9/L (ref 130–450)
PLATELET # BLD: 286 E9/L (ref 130–450)
PLATELET # BLD: 288 E9/L (ref 130–450)
PLATELET # BLD: 310 E9/L (ref 130–450)
PLATELET # BLD: 317 E9/L (ref 130–450)
PLATELET # BLD: 328 E9/L (ref 130–450)
PLATELET # BLD: 342 E9/L (ref 130–450)
PLATELET # BLD: 372 E9/L (ref 130–450)
PMV BLD AUTO: 10.1 FL (ref 7–12)
PMV BLD AUTO: 10.1 FL (ref 7–12)
PMV BLD AUTO: 10.2 FL (ref 7–12)
PMV BLD AUTO: 10.3 FL (ref 7–12)
PMV BLD AUTO: 10.5 FL (ref 7–12)
PMV BLD AUTO: 9.3 FL (ref 7–12)
PMV BLD AUTO: 9.4 FL (ref 7–12)
PMV BLD AUTO: 9.6 FL (ref 7–12)
PMV BLD AUTO: 9.9 FL (ref 7–12)
PO2: 88 MMHG (ref 75–100)
POIKILOCYTES: ABNORMAL
POLYCHROMASIA: ABNORMAL
POTASSIUM REFLEX MAGNESIUM: 3.4 MMOL/L (ref 3.5–5)
POTASSIUM REFLEX MAGNESIUM: 3.5 MMOL/L (ref 3.5–5)
POTASSIUM REFLEX MAGNESIUM: 3.6 MMOL/L (ref 3.5–5)
POTASSIUM REFLEX MAGNESIUM: 4 MMOL/L (ref 3.5–5)
POTASSIUM REFLEX MAGNESIUM: 4.3 MMOL/L (ref 3.5–5)
POTASSIUM REFLEX MAGNESIUM: 4.5 MMOL/L (ref 3.5–5)
POTASSIUM SERPL-SCNC: 3.4 MMOL/L (ref 3.5–5)
POTASSIUM SERPL-SCNC: 3.8 MMOL/L (ref 3.5–5)
PRO-BNP: 1052 PG/ML (ref 0–450)
PRO-BNP: 276 PG/ML (ref 0–450)
PRO-BNP: 464 PG/ML (ref 0–450)
PROTEIN UA: 30 MG/DL
PROTEIN UA: NEGATIVE MG/DL
PROTHROMBIN TIME: 13.9 SEC (ref 9.3–12.4)
RBC # BLD: 2.54 E12/L (ref 3.5–5.5)
RBC # BLD: 2.94 E12/L (ref 3.5–5.5)
RBC # BLD: 3.02 E12/L (ref 3.5–5.5)
RBC # BLD: 3.07 E12/L (ref 3.5–5.5)
RBC # BLD: 3.27 E12/L (ref 3.5–5.5)
RBC # BLD: 3.27 E12/L (ref 3.5–5.5)
RBC # BLD: 3.45 E12/L (ref 3.5–5.5)
RBC # BLD: 3.49 E12/L (ref 3.5–5.5)
RBC # BLD: 3.57 E12/L (ref 3.5–5.5)
RBC UA: ABNORMAL /HPF (ref 0–2)
SARS-COV-2, NAAT: NOT DETECTED
SARS-COV-2, NAAT: NOT DETECTED
SODIUM BLD-SCNC: 136 MMOL/L (ref 132–146)
SODIUM BLD-SCNC: 140 MMOL/L (ref 132–146)
SODIUM BLD-SCNC: 140 MMOL/L (ref 132–146)
SODIUM BLD-SCNC: 141 MMOL/L (ref 132–146)
SODIUM BLD-SCNC: 142 MMOL/L (ref 132–146)
SODIUM BLD-SCNC: 142 MMOL/L (ref 132–146)
SODIUM BLD-SCNC: 144 MMOL/L (ref 132–146)
SODIUM BLD-SCNC: 144 MMOL/L (ref 132–146)
SOURCE, BLOOD GAS: ABNORMAL
SPECIFIC GRAVITY UA: 1.01 (ref 1–1.03)
SPECIFIC GRAVITY UA: 1.02 (ref 1–1.03)
THB: 8.5 G/DL (ref 11.5–16.5)
TIME ANALYZED: 1602
TOTAL IRON BINDING CAPACITY: 204 MCG/DL (ref 250–450)
TOTAL PROTEIN: 6.5 G/DL (ref 6.4–8.3)
TOTAL PROTEIN: 6.9 G/DL (ref 6.4–8.3)
TROPONIN, HIGH SENSITIVITY: 13 NG/L (ref 0–9)
TROPONIN, HIGH SENSITIVITY: 13 NG/L (ref 0–9)
TROPONIN, HIGH SENSITIVITY: 14 NG/L (ref 0–9)
TROPONIN, HIGH SENSITIVITY: 20 NG/L (ref 0–9)
TROPONIN: <0.01 NG/ML (ref 0–0.03)
URINE CULTURE, ROUTINE: ABNORMAL
URINE CULTURE, ROUTINE: NORMAL
UROBILINOGEN, URINE: 0.2 E.U./DL
UROBILINOGEN, URINE: 0.2 E.U./DL
WBC # BLD: 13.9 E9/L (ref 4.5–11.5)
WBC # BLD: 14.5 E9/L (ref 4.5–11.5)
WBC # BLD: 5.9 E9/L (ref 4.5–11.5)
WBC # BLD: 6.3 E9/L (ref 4.5–11.5)
WBC # BLD: 7.5 E9/L (ref 4.5–11.5)
WBC # BLD: 7.9 E9/L (ref 4.5–11.5)
WBC # BLD: 8.2 E9/L (ref 4.5–11.5)
WBC # BLD: 9.1 E9/L (ref 4.5–11.5)
WBC # BLD: 9.2 E9/L (ref 4.5–11.5)
WBC UA: ABNORMAL /HPF (ref 0–5)

## 2021-01-01 PROCEDURE — 6360000002 HC RX W HCPCS: Performed by: STUDENT IN AN ORGANIZED HEALTH CARE EDUCATION/TRAINING PROGRAM

## 2021-01-01 PROCEDURE — 2700000000 HC OXYGEN THERAPY PER DAY

## 2021-01-01 PROCEDURE — 85025 COMPLETE CBC W/AUTO DIFF WBC: CPT

## 2021-01-01 PROCEDURE — 83550 IRON BINDING TEST: CPT

## 2021-01-01 PROCEDURE — 99284 EMERGENCY DEPT VISIT MOD MDM: CPT

## 2021-01-01 PROCEDURE — 6370000000 HC RX 637 (ALT 250 FOR IP): Performed by: STUDENT IN AN ORGANIZED HEALTH CARE EDUCATION/TRAINING PROGRAM

## 2021-01-01 PROCEDURE — 93971 EXTREMITY STUDY: CPT

## 2021-01-01 PROCEDURE — G8400 PT W/DXA NO RESULTS DOC: HCPCS | Performed by: INTERNAL MEDICINE

## 2021-01-01 PROCEDURE — 2780000010 HC IMPLANT OTHER: Performed by: SURGERY

## 2021-01-01 PROCEDURE — 1090F PRES/ABSN URINE INCON ASSESS: CPT | Performed by: INTERNAL MEDICINE

## 2021-01-01 PROCEDURE — 83735 ASSAY OF MAGNESIUM: CPT

## 2021-01-01 PROCEDURE — 0D1L4Z4 BYPASS TRANSVERSE COLON TO CUTANEOUS, PERCUTANEOUS ENDOSCOPIC APPROACH: ICD-10-PCS | Performed by: STUDENT IN AN ORGANIZED HEALTH CARE EDUCATION/TRAINING PROGRAM

## 2021-01-01 PROCEDURE — 80048 BASIC METABOLIC PNL TOTAL CA: CPT

## 2021-01-01 PROCEDURE — 83605 ASSAY OF LACTIC ACID: CPT

## 2021-01-01 PROCEDURE — 6360000002 HC RX W HCPCS: Performed by: NURSE ANESTHETIST, CERTIFIED REGISTERED

## 2021-01-01 PROCEDURE — 2500000003 HC RX 250 WO HCPCS: Performed by: STUDENT IN AN ORGANIZED HEALTH CARE EDUCATION/TRAINING PROGRAM

## 2021-01-01 PROCEDURE — 2580000003 HC RX 258: Performed by: STUDENT IN AN ORGANIZED HEALTH CARE EDUCATION/TRAINING PROGRAM

## 2021-01-01 PROCEDURE — 2720000010 HC SURG SUPPLY STERILE: Performed by: SURGERY

## 2021-01-01 PROCEDURE — 2580000003 HC RX 258: Performed by: INTERNAL MEDICINE

## 2021-01-01 PROCEDURE — 97535 SELF CARE MNGMENT TRAINING: CPT

## 2021-01-01 PROCEDURE — 6360000002 HC RX W HCPCS: Performed by: INTERNAL MEDICINE

## 2021-01-01 PROCEDURE — 87077 CULTURE AEROBIC IDENTIFY: CPT

## 2021-01-01 PROCEDURE — G8417 CALC BMI ABV UP PARAM F/U: HCPCS | Performed by: INTERNAL MEDICINE

## 2021-01-01 PROCEDURE — 3600000003 HC SURGERY LEVEL 3 BASE: Performed by: SURGERY

## 2021-01-01 PROCEDURE — 2580000003 HC RX 258: Performed by: FAMILY MEDICINE

## 2021-01-01 PROCEDURE — 84484 ASSAY OF TROPONIN QUANT: CPT

## 2021-01-01 PROCEDURE — 88309 TISSUE EXAM BY PATHOLOGIST: CPT

## 2021-01-01 PROCEDURE — 83880 ASSAY OF NATRIURETIC PEPTIDE: CPT

## 2021-01-01 PROCEDURE — 93010 ELECTROCARDIOGRAM REPORT: CPT | Performed by: INTERNAL MEDICINE

## 2021-01-01 PROCEDURE — 6360000002 HC RX W HCPCS: Performed by: EMERGENCY MEDICINE

## 2021-01-01 PROCEDURE — 2060000000 HC ICU INTERMEDIATE R&B

## 2021-01-01 PROCEDURE — 1200000000 HC SEMI PRIVATE

## 2021-01-01 PROCEDURE — 82962 GLUCOSE BLOOD TEST: CPT

## 2021-01-01 PROCEDURE — 2580000003 HC RX 258: Performed by: EMERGENCY MEDICINE

## 2021-01-01 PROCEDURE — 99203 OFFICE O/P NEW LOW 30 MIN: CPT

## 2021-01-01 PROCEDURE — 93306 TTE W/DOPPLER COMPLETE: CPT

## 2021-01-01 PROCEDURE — 3700000000 HC ANESTHESIA ATTENDED CARE: Performed by: SURGERY

## 2021-01-01 PROCEDURE — 51701 INSERT BLADDER CATHETER: CPT

## 2021-01-01 PROCEDURE — 6360000002 HC RX W HCPCS: Performed by: SURGERY

## 2021-01-01 PROCEDURE — 44320 COLOSTOMY: CPT | Performed by: SURGERY

## 2021-01-01 PROCEDURE — 87040 BLOOD CULTURE FOR BACTERIA: CPT

## 2021-01-01 PROCEDURE — 97530 THERAPEUTIC ACTIVITIES: CPT

## 2021-01-01 PROCEDURE — 87088 URINE BACTERIA CULTURE: CPT

## 2021-01-01 PROCEDURE — 71275 CT ANGIOGRAPHY CHEST: CPT

## 2021-01-01 PROCEDURE — 99233 SBSQ HOSP IP/OBS HIGH 50: CPT | Performed by: SURGERY

## 2021-01-01 PROCEDURE — 93005 ELECTROCARDIOGRAM TRACING: CPT | Performed by: STUDENT IN AN ORGANIZED HEALTH CARE EDUCATION/TRAINING PROGRAM

## 2021-01-01 PROCEDURE — 71045 X-RAY EXAM CHEST 1 VIEW: CPT

## 2021-01-01 PROCEDURE — 0DTF0ZZ RESECTION OF RIGHT LARGE INTESTINE, OPEN APPROACH: ICD-10-PCS | Performed by: STUDENT IN AN ORGANIZED HEALTH CARE EDUCATION/TRAINING PROGRAM

## 2021-01-01 PROCEDURE — 1123F ACP DISCUSS/DSCN MKR DOCD: CPT | Performed by: INTERNAL MEDICINE

## 2021-01-01 PROCEDURE — 97165 OT EVAL LOW COMPLEX 30 MIN: CPT

## 2021-01-01 PROCEDURE — 3700000001 HC ADD 15 MINUTES (ANESTHESIA): Performed by: SURGERY

## 2021-01-01 PROCEDURE — 83540 ASSAY OF IRON: CPT

## 2021-01-01 PROCEDURE — 96375 TX/PRO/DX INJ NEW DRUG ADDON: CPT

## 2021-01-01 PROCEDURE — 93005 ELECTROCARDIOGRAM TRACING: CPT | Performed by: EMERGENCY MEDICINE

## 2021-01-01 PROCEDURE — 85014 HEMATOCRIT: CPT

## 2021-01-01 PROCEDURE — P9016 RBC LEUKOCYTES REDUCED: HCPCS

## 2021-01-01 PROCEDURE — 36415 COLL VENOUS BLD VENIPUNCTURE: CPT

## 2021-01-01 PROCEDURE — 82728 ASSAY OF FERRITIN: CPT

## 2021-01-01 PROCEDURE — 81003 URINALYSIS AUTO W/O SCOPE: CPT

## 2021-01-01 PROCEDURE — 70450 CT HEAD/BRAIN W/O DYE: CPT

## 2021-01-01 PROCEDURE — 86901 BLOOD TYPING SEROLOGIC RH(D): CPT

## 2021-01-01 PROCEDURE — 99024 POSTOP FOLLOW-UP VISIT: CPT | Performed by: SURGERY

## 2021-01-01 PROCEDURE — 2580000003 HC RX 258

## 2021-01-01 PROCEDURE — 85018 HEMOGLOBIN: CPT

## 2021-01-01 PROCEDURE — 81001 URINALYSIS AUTO W/SCOPE: CPT

## 2021-01-01 PROCEDURE — 6370000000 HC RX 637 (ALT 250 FOR IP): Performed by: FAMILY MEDICINE

## 2021-01-01 PROCEDURE — 87635 SARS-COV-2 COVID-19 AMP PRB: CPT

## 2021-01-01 PROCEDURE — 86850 RBC ANTIBODY SCREEN: CPT

## 2021-01-01 PROCEDURE — 7100000000 HC PACU RECOVERY - FIRST 15 MIN: Performed by: SURGERY

## 2021-01-01 PROCEDURE — 99212 OFFICE O/P EST SF 10 MIN: CPT | Performed by: INTERNAL MEDICINE

## 2021-01-01 PROCEDURE — 80053 COMPREHEN METABOLIC PANEL: CPT

## 2021-01-01 PROCEDURE — 2580000003 HC RX 258: Performed by: NURSE PRACTITIONER

## 2021-01-01 PROCEDURE — 7100000001 HC PACU RECOVERY - ADDTL 15 MIN: Performed by: SURGERY

## 2021-01-01 PROCEDURE — 4040F PNEUMOC VAC/ADMIN/RCVD: CPT | Performed by: INTERNAL MEDICINE

## 2021-01-01 PROCEDURE — G8427 DOCREV CUR MEDS BY ELIG CLIN: HCPCS | Performed by: INTERNAL MEDICINE

## 2021-01-01 PROCEDURE — 86900 BLOOD TYPING SEROLOGIC ABO: CPT

## 2021-01-01 PROCEDURE — 86923 COMPATIBILITY TEST ELECTRIC: CPT

## 2021-01-01 PROCEDURE — 99202 OFFICE O/P NEW SF 15 MIN: CPT | Performed by: SURGERY

## 2021-01-01 PROCEDURE — 97530 THERAPEUTIC ACTIVITIES: CPT | Performed by: PHYSICAL THERAPIST

## 2021-01-01 PROCEDURE — 97168 OT RE-EVAL EST PLAN CARE: CPT

## 2021-01-01 PROCEDURE — 2500000003 HC RX 250 WO HCPCS

## 2021-01-01 PROCEDURE — 85610 PROTHROMBIN TIME: CPT

## 2021-01-01 PROCEDURE — 87186 SC STD MICRODIL/AGAR DIL: CPT

## 2021-01-01 PROCEDURE — 82378 CARCINOEMBRYONIC ANTIGEN: CPT

## 2021-01-01 PROCEDURE — 1036F TOBACCO NON-USER: CPT | Performed by: INTERNAL MEDICINE

## 2021-01-01 PROCEDURE — 96374 THER/PROPH/DIAG INJ IV PUSH: CPT

## 2021-01-01 PROCEDURE — 99222 1ST HOSP IP/OBS MODERATE 55: CPT | Performed by: SURGERY

## 2021-01-01 PROCEDURE — 36430 TRANSFUSION BLD/BLD COMPNT: CPT

## 2021-01-01 PROCEDURE — 97161 PT EVAL LOW COMPLEX 20 MIN: CPT

## 2021-01-01 PROCEDURE — 2709999900 HC NON-CHARGEABLE SUPPLY: Performed by: SURGERY

## 2021-01-01 PROCEDURE — 44160 REMOVAL OF COLON: CPT | Performed by: SURGERY

## 2021-01-01 PROCEDURE — 82805 BLOOD GASES W/O2 SATURATION: CPT

## 2021-01-01 PROCEDURE — 6360000002 HC RX W HCPCS

## 2021-01-01 PROCEDURE — 82010 KETONE BODYS QUAN: CPT

## 2021-01-01 PROCEDURE — 2500000003 HC RX 250 WO HCPCS: Performed by: NURSE ANESTHETIST, CERTIFIED REGISTERED

## 2021-01-01 PROCEDURE — 74177 CT ABD & PELVIS W/CONTRAST: CPT

## 2021-01-01 PROCEDURE — 99283 EMERGENCY DEPT VISIT LOW MDM: CPT

## 2021-01-01 PROCEDURE — 3600000013 HC SURGERY LEVEL 3 ADDTL 15MIN: Performed by: SURGERY

## 2021-01-01 PROCEDURE — 6360000004 HC RX CONTRAST MEDICATION: Performed by: EMERGENCY MEDICINE

## 2021-01-01 PROCEDURE — 80076 HEPATIC FUNCTION PANEL: CPT

## 2021-01-01 RX ORDER — TRAZODONE HYDROCHLORIDE 50 MG/1
50 TABLET ORAL NIGHTLY
Qty: 30 TABLET | Refills: 3 | Status: SHIPPED | OUTPATIENT
Start: 2021-01-01

## 2021-01-01 RX ORDER — ONDANSETRON 4 MG/1
4 TABLET, ORALLY DISINTEGRATING ORAL EVERY 8 HOURS PRN
Status: DISCONTINUED | OUTPATIENT
Start: 2021-01-01 | End: 2021-01-01 | Stop reason: HOSPADM

## 2021-01-01 RX ORDER — DEXTROSE MONOHYDRATE 50 MG/ML
100 INJECTION, SOLUTION INTRAVENOUS PRN
Status: DISCONTINUED | OUTPATIENT
Start: 2021-01-01 | End: 2021-01-01 | Stop reason: HOSPADM

## 2021-01-01 RX ORDER — EPHEDRINE SULFATE/0.9% NACL/PF 50 MG/5 ML
SYRINGE (ML) INTRAVENOUS PRN
Status: DISCONTINUED | OUTPATIENT
Start: 2021-01-01 | End: 2021-01-01 | Stop reason: SDUPTHER

## 2021-01-01 RX ORDER — ACETAMINOPHEN 325 MG/1
650 TABLET ORAL EVERY 6 HOURS PRN
Status: DISCONTINUED | OUTPATIENT
Start: 2021-01-01 | End: 2021-01-01 | Stop reason: HOSPADM

## 2021-01-01 RX ORDER — SODIUM CHLORIDE 9 MG/ML
25 INJECTION, SOLUTION INTRAVENOUS PRN
Status: DISCONTINUED | OUTPATIENT
Start: 2021-01-01 | End: 2021-01-01 | Stop reason: HOSPADM

## 2021-01-01 RX ORDER — MAGNESIUM SULFATE 1 G/100ML
1000 INJECTION INTRAVENOUS ONCE
Status: COMPLETED | OUTPATIENT
Start: 2021-01-01 | End: 2021-01-01

## 2021-01-01 RX ORDER — GLYCOPYRROLATE 1 MG/5 ML
SYRINGE (ML) INTRAVENOUS PRN
Status: DISCONTINUED | OUTPATIENT
Start: 2021-01-01 | End: 2021-01-01 | Stop reason: SDUPTHER

## 2021-01-01 RX ORDER — POTASSIUM CHLORIDE 20 MEQ/1
40 TABLET, EXTENDED RELEASE ORAL ONCE
Status: COMPLETED | OUTPATIENT
Start: 2021-01-01 | End: 2021-01-01

## 2021-01-01 RX ORDER — HYDROCODONE BITARTRATE AND ACETAMINOPHEN 5; 325 MG/1; MG/1
2 TABLET ORAL PRN
Status: DISCONTINUED | OUTPATIENT
Start: 2021-01-01 | End: 2021-01-01 | Stop reason: HOSPADM

## 2021-01-01 RX ORDER — MAGNESIUM SULFATE IN WATER 40 MG/ML
2000 INJECTION, SOLUTION INTRAVENOUS ONCE
Status: COMPLETED | OUTPATIENT
Start: 2021-01-01 | End: 2021-01-01

## 2021-01-01 RX ORDER — SODIUM CHLORIDE 0.9 % (FLUSH) 0.9 %
5-40 SYRINGE (ML) INJECTION PRN
Status: DISCONTINUED | OUTPATIENT
Start: 2021-01-01 | End: 2021-01-01 | Stop reason: HOSPADM

## 2021-01-01 RX ORDER — LEVOTHYROXINE SODIUM 0.07 MG/1
75 TABLET ORAL DAILY
Status: DISCONTINUED | OUTPATIENT
Start: 2021-01-01 | End: 2021-01-01 | Stop reason: HOSPADM

## 2021-01-01 RX ORDER — 0.9 % SODIUM CHLORIDE 0.9 %
500 INTRAVENOUS SOLUTION INTRAVENOUS ONCE
Status: COMPLETED | OUTPATIENT
Start: 2021-01-01 | End: 2021-01-01

## 2021-01-01 RX ORDER — ROCURONIUM BROMIDE 10 MG/ML
INJECTION, SOLUTION INTRAVENOUS PRN
Status: DISCONTINUED | OUTPATIENT
Start: 2021-01-01 | End: 2021-01-01 | Stop reason: SDUPTHER

## 2021-01-01 RX ORDER — DEXAMETHASONE SODIUM PHOSPHATE 10 MG/ML
INJECTION INTRAMUSCULAR; INTRAVENOUS PRN
Status: DISCONTINUED | OUTPATIENT
Start: 2021-01-01 | End: 2021-01-01 | Stop reason: SDUPTHER

## 2021-01-01 RX ORDER — FUROSEMIDE 10 MG/ML
20 INJECTION INTRAMUSCULAR; INTRAVENOUS ONCE
Status: COMPLETED | OUTPATIENT
Start: 2021-01-01 | End: 2021-01-01

## 2021-01-01 RX ORDER — DONEPEZIL HYDROCHLORIDE 5 MG/1
5 TABLET, FILM COATED ORAL NIGHTLY
Status: DISCONTINUED | OUTPATIENT
Start: 2021-01-01 | End: 2021-01-01 | Stop reason: HOSPADM

## 2021-01-01 RX ORDER — MORPHINE SULFATE 2 MG/ML
1 INJECTION, SOLUTION INTRAMUSCULAR; INTRAVENOUS EVERY 5 MIN PRN
Status: DISCONTINUED | OUTPATIENT
Start: 2021-01-01 | End: 2021-01-01 | Stop reason: HOSPADM

## 2021-01-01 RX ORDER — NICOTINE POLACRILEX 4 MG
15 LOZENGE BUCCAL PRN
Status: DISCONTINUED | OUTPATIENT
Start: 2021-01-01 | End: 2021-01-01 | Stop reason: HOSPADM

## 2021-01-01 RX ORDER — ACETAMINOPHEN 500 MG
1000 TABLET ORAL EVERY 6 HOURS
Status: DISCONTINUED | OUTPATIENT
Start: 2021-01-01 | End: 2021-01-01 | Stop reason: HOSPADM

## 2021-01-01 RX ORDER — FERROUS SULFATE 300 MG/5ML
300 LIQUID (ML) ORAL DAILY
COMMUNITY

## 2021-01-01 RX ORDER — ONDANSETRON 2 MG/ML
4 INJECTION INTRAMUSCULAR; INTRAVENOUS EVERY 6 HOURS PRN
Status: DISCONTINUED | OUTPATIENT
Start: 2021-01-01 | End: 2021-01-01 | Stop reason: HOSPADM

## 2021-01-01 RX ORDER — SODIUM CHLORIDE 9 MG/ML
INJECTION, SOLUTION INTRAVENOUS CONTINUOUS
Status: DISCONTINUED | OUTPATIENT
Start: 2021-01-01 | End: 2021-01-01

## 2021-01-01 RX ORDER — MEMANTINE HYDROCHLORIDE 10 MG/1
10 TABLET ORAL DAILY
Status: DISCONTINUED | OUTPATIENT
Start: 2021-01-01 | End: 2021-01-01 | Stop reason: HOSPADM

## 2021-01-01 RX ORDER — PROPOFOL 10 MG/ML
INJECTION, EMULSION INTRAVENOUS PRN
Status: DISCONTINUED | OUTPATIENT
Start: 2021-01-01 | End: 2021-01-01 | Stop reason: SDUPTHER

## 2021-01-01 RX ORDER — LIDOCAINE HYDROCHLORIDE 20 MG/ML
INJECTION, SOLUTION INTRAVENOUS PRN
Status: DISCONTINUED | OUTPATIENT
Start: 2021-01-01 | End: 2021-01-01 | Stop reason: SDUPTHER

## 2021-01-01 RX ORDER — NEOSTIGMINE METHYLSULFATE 1 MG/ML
INJECTION, SOLUTION INTRAVENOUS PRN
Status: DISCONTINUED | OUTPATIENT
Start: 2021-01-01 | End: 2021-01-01 | Stop reason: SDUPTHER

## 2021-01-01 RX ORDER — CEPHALEXIN 500 MG/1
500 CAPSULE ORAL 4 TIMES DAILY
Qty: 28 CAPSULE | Refills: 0 | Status: SHIPPED | OUTPATIENT
Start: 2021-01-01 | End: 2021-01-01

## 2021-01-01 RX ORDER — HYDROCODONE BITARTRATE AND ACETAMINOPHEN 5; 325 MG/1; MG/1
1 TABLET ORAL PRN
Status: DISCONTINUED | OUTPATIENT
Start: 2021-01-01 | End: 2021-01-01 | Stop reason: HOSPADM

## 2021-01-01 RX ORDER — MEMANTINE HYDROCHLORIDE 5 MG/1
5 TABLET ORAL DAILY
Status: DISCONTINUED | OUTPATIENT
Start: 2021-01-01 | End: 2021-01-01 | Stop reason: HOSPADM

## 2021-01-01 RX ORDER — SODIUM CHLORIDE 0.9 % (FLUSH) 0.9 %
5-40 SYRINGE (ML) INJECTION EVERY 12 HOURS SCHEDULED
Status: DISCONTINUED | OUTPATIENT
Start: 2021-01-01 | End: 2021-01-01 | Stop reason: HOSPADM

## 2021-01-01 RX ORDER — POTASSIUM CHLORIDE 7.45 MG/ML
10 INJECTION INTRAVENOUS
Status: COMPLETED | OUTPATIENT
Start: 2021-01-01 | End: 2021-01-01

## 2021-01-01 RX ORDER — MORPHINE SULFATE 2 MG/ML
2 INJECTION, SOLUTION INTRAMUSCULAR; INTRAVENOUS EVERY 5 MIN PRN
Status: DISCONTINUED | OUTPATIENT
Start: 2021-01-01 | End: 2021-01-01 | Stop reason: HOSPADM

## 2021-01-01 RX ORDER — SODIUM CHLORIDE 9 MG/ML
INJECTION, SOLUTION INTRAVENOUS CONTINUOUS PRN
Status: DISCONTINUED | OUTPATIENT
Start: 2021-01-01 | End: 2021-01-01 | Stop reason: SDUPTHER

## 2021-01-01 RX ORDER — MEPERIDINE HYDROCHLORIDE 25 MG/ML
12.5 INJECTION INTRAMUSCULAR; INTRAVENOUS; SUBCUTANEOUS EVERY 5 MIN PRN
Status: DISCONTINUED | OUTPATIENT
Start: 2021-01-01 | End: 2021-01-01 | Stop reason: HOSPADM

## 2021-01-01 RX ORDER — PHENYLEPHRINE HCL IN 0.9% NACL 1 MG/10 ML
SYRINGE (ML) INTRAVENOUS PRN
Status: DISCONTINUED | OUTPATIENT
Start: 2021-01-01 | End: 2021-01-01 | Stop reason: SDUPTHER

## 2021-01-01 RX ORDER — POLYETHYLENE GLYCOL 3350 17 G/17G
17 POWDER, FOR SOLUTION ORAL DAILY PRN
Status: DISCONTINUED | OUTPATIENT
Start: 2021-01-01 | End: 2021-01-01 | Stop reason: HOSPADM

## 2021-01-01 RX ORDER — MECOBALAMIN 5000 MCG
5 TABLET,DISINTEGRATING ORAL NIGHTLY
Status: DISCONTINUED | OUTPATIENT
Start: 2021-01-01 | End: 2021-01-01 | Stop reason: HOSPADM

## 2021-01-01 RX ORDER — DOCUSATE SODIUM 100 MG/1
100 CAPSULE, LIQUID FILLED ORAL 2 TIMES DAILY
Status: DISCONTINUED | OUTPATIENT
Start: 2021-01-01 | End: 2021-01-01 | Stop reason: HOSPADM

## 2021-01-01 RX ORDER — SODIUM CHLORIDE 450 MG/100ML
INJECTION, SOLUTION INTRAVENOUS CONTINUOUS
Status: DISCONTINUED | OUTPATIENT
Start: 2021-01-01 | End: 2021-01-01

## 2021-01-01 RX ORDER — SODIUM CHLORIDE, SODIUM LACTATE, POTASSIUM CHLORIDE, AND CALCIUM CHLORIDE .6; .31; .03; .02 G/100ML; G/100ML; G/100ML; G/100ML
500 INJECTION, SOLUTION INTRAVENOUS ONCE
Status: COMPLETED | OUTPATIENT
Start: 2021-01-01 | End: 2021-01-01

## 2021-01-01 RX ORDER — ACETAMINOPHEN 650 MG/1
650 SUPPOSITORY RECTAL EVERY 6 HOURS PRN
Status: DISCONTINUED | OUTPATIENT
Start: 2021-01-01 | End: 2021-01-01 | Stop reason: HOSPADM

## 2021-01-01 RX ORDER — SODIUM CHLORIDE, SODIUM LACTATE, POTASSIUM CHLORIDE, CALCIUM CHLORIDE 600; 310; 30; 20 MG/100ML; MG/100ML; MG/100ML; MG/100ML
INJECTION, SOLUTION INTRAVENOUS CONTINUOUS
Status: DISCONTINUED | OUTPATIENT
Start: 2021-01-01 | End: 2021-01-01 | Stop reason: HOSPADM

## 2021-01-01 RX ORDER — ONDANSETRON 2 MG/ML
INJECTION INTRAMUSCULAR; INTRAVENOUS PRN
Status: DISCONTINUED | OUTPATIENT
Start: 2021-01-01 | End: 2021-01-01 | Stop reason: SDUPTHER

## 2021-01-01 RX ORDER — DEXTROSE MONOHYDRATE 25 G/50ML
12.5 INJECTION, SOLUTION INTRAVENOUS PRN
Status: DISCONTINUED | OUTPATIENT
Start: 2021-01-01 | End: 2021-01-01 | Stop reason: HOSPADM

## 2021-01-01 RX ORDER — SODIUM CHLORIDE 9 MG/ML
INJECTION, SOLUTION INTRAVENOUS PRN
Status: DISCONTINUED | OUTPATIENT
Start: 2021-01-01 | End: 2021-01-01 | Stop reason: HOSPADM

## 2021-01-01 RX ORDER — PROMETHAZINE HYDROCHLORIDE 25 MG/ML
6.25 INJECTION, SOLUTION INTRAMUSCULAR; INTRAVENOUS EVERY 10 MIN PRN
Status: DISCONTINUED | OUTPATIENT
Start: 2021-01-01 | End: 2021-01-01 | Stop reason: HOSPADM

## 2021-01-01 RX ORDER — OXYCODONE HYDROCHLORIDE 5 MG/1
2.5 TABLET ORAL EVERY 4 HOURS PRN
Status: DISCONTINUED | OUTPATIENT
Start: 2021-01-01 | End: 2021-01-01 | Stop reason: HOSPADM

## 2021-01-01 RX ORDER — FENTANYL CITRATE 50 UG/ML
INJECTION, SOLUTION INTRAMUSCULAR; INTRAVENOUS PRN
Status: DISCONTINUED | OUTPATIENT
Start: 2021-01-01 | End: 2021-01-01 | Stop reason: SDUPTHER

## 2021-01-01 RX ORDER — ROPIVACAINE HYDROCHLORIDE 5 MG/ML
INJECTION, SOLUTION EPIDURAL; INFILTRATION; PERINEURAL PRN
Status: DISCONTINUED | OUTPATIENT
Start: 2021-01-01 | End: 2021-01-01 | Stop reason: ALTCHOICE

## 2021-01-01 RX ORDER — DONEPEZIL HYDROCHLORIDE 5 MG/1
10 TABLET, FILM COATED ORAL NIGHTLY
Status: DISCONTINUED | OUTPATIENT
Start: 2021-01-01 | End: 2021-01-01 | Stop reason: HOSPADM

## 2021-01-01 RX ADMIN — ACETAMINOPHEN 650 MG: 325 TABLET ORAL at 06:39

## 2021-01-01 RX ADMIN — ROCURONIUM BROMIDE 10 MG: 10 INJECTION, SOLUTION INTRAVENOUS at 10:22

## 2021-01-01 RX ADMIN — ACETAMINOPHEN 1000 MG: 500 TABLET ORAL at 21:46

## 2021-01-01 RX ADMIN — SODIUM CHLORIDE 125 MG: 9 INJECTION, SOLUTION INTRAVENOUS at 20:55

## 2021-01-01 RX ADMIN — SODIUM CHLORIDE: 9 INJECTION, SOLUTION INTRAVENOUS at 13:42

## 2021-01-01 RX ADMIN — Medication 10 ML: at 21:25

## 2021-01-01 RX ADMIN — MEMANTINE HYDROCHLORIDE 10 MG: 10 TABLET, FILM COATED ORAL at 08:51

## 2021-01-01 RX ADMIN — FENTANYL CITRATE 100 MCG: 50 INJECTION, SOLUTION INTRAMUSCULAR; INTRAVENOUS at 09:28

## 2021-01-01 RX ADMIN — SODIUM CHLORIDE, POTASSIUM CHLORIDE, SODIUM LACTATE AND CALCIUM CHLORIDE: 600; 310; 30; 20 INJECTION, SOLUTION INTRAVENOUS at 09:02

## 2021-01-01 RX ADMIN — CEFTRIAXONE SODIUM 2000 MG: 2 INJECTION, POWDER, FOR SOLUTION INTRAMUSCULAR; INTRAVENOUS at 19:24

## 2021-01-01 RX ADMIN — LIDOCAINE HYDROCHLORIDE 40 MG: 20 INJECTION, SOLUTION INTRAVENOUS at 09:28

## 2021-01-01 RX ADMIN — CEFTRIAXONE SODIUM 2000 MG: 2 INJECTION, POWDER, FOR SOLUTION INTRAMUSCULAR; INTRAVENOUS at 19:26

## 2021-01-01 RX ADMIN — Medication 3 MG: at 12:03

## 2021-01-01 RX ADMIN — SODIUM CHLORIDE, POTASSIUM CHLORIDE, SODIUM LACTATE AND CALCIUM CHLORIDE: 600; 310; 30; 20 INJECTION, SOLUTION INTRAVENOUS at 20:40

## 2021-01-01 RX ADMIN — DONEPEZIL HYDROCHLORIDE 5 MG: 5 TABLET, FILM COATED ORAL at 21:47

## 2021-01-01 RX ADMIN — FENTANYL CITRATE 50 MCG: 50 INJECTION, SOLUTION INTRAMUSCULAR; INTRAVENOUS at 09:53

## 2021-01-01 RX ADMIN — MEMANTINE HYDROCHLORIDE 10 MG: 10 TABLET, FILM COATED ORAL at 10:01

## 2021-01-01 RX ADMIN — SODIUM CHLORIDE 500 ML: 9 INJECTION, SOLUTION INTRAVENOUS at 08:52

## 2021-01-01 RX ADMIN — METRONIDAZOLE 500 MG: 500 INJECTION, SOLUTION INTRAVENOUS at 18:34

## 2021-01-01 RX ADMIN — DOCUSATE SODIUM 100 MG: 100 CAPSULE ORAL at 07:40

## 2021-01-01 RX ADMIN — DONEPEZIL HYDROCHLORIDE 5 MG: 5 TABLET, FILM COATED ORAL at 21:07

## 2021-01-01 RX ADMIN — METRONIDAZOLE 500 MG: 500 INJECTION, SOLUTION INTRAVENOUS at 11:41

## 2021-01-01 RX ADMIN — METRONIDAZOLE 500 MG: 500 INJECTION, SOLUTION INTRAVENOUS at 04:30

## 2021-01-01 RX ADMIN — DOCUSATE SODIUM 100 MG: 100 CAPSULE ORAL at 08:32

## 2021-01-01 RX ADMIN — DOCUSATE SODIUM 100 MG: 100 CAPSULE ORAL at 08:50

## 2021-01-01 RX ADMIN — Medication 5 MG: at 20:43

## 2021-01-01 RX ADMIN — MEMANTINE HYDROCHLORIDE 10 MG: 10 TABLET, FILM COATED ORAL at 07:57

## 2021-01-01 RX ADMIN — MEMANTINE HYDROCHLORIDE 5 MG: 5 TABLET, FILM COATED ORAL at 09:52

## 2021-01-01 RX ADMIN — DONEPEZIL HYDROCHLORIDE 10 MG: 5 TABLET, FILM COATED ORAL at 20:44

## 2021-01-01 RX ADMIN — DOCUSATE SODIUM 100 MG: 100 CAPSULE ORAL at 10:01

## 2021-01-01 RX ADMIN — LEVOTHYROXINE SODIUM 75 MCG: 0.07 TABLET ORAL at 06:40

## 2021-01-01 RX ADMIN — ROCURONIUM BROMIDE 30 MG: 10 INJECTION, SOLUTION INTRAVENOUS at 09:55

## 2021-01-01 RX ADMIN — METRONIDAZOLE 500 MG: 500 INJECTION, SOLUTION INTRAVENOUS at 20:09

## 2021-01-01 RX ADMIN — Medication 10 ML: at 20:42

## 2021-01-01 RX ADMIN — POTASSIUM CHLORIDE 10 MEQ: 10 INJECTION, SOLUTION INTRAVENOUS at 22:05

## 2021-01-01 RX ADMIN — PROPOFOL 100 MG: 10 INJECTION, EMULSION INTRAVENOUS at 09:28

## 2021-01-01 RX ADMIN — METRONIDAZOLE 500 MG: 500 INJECTION, SOLUTION INTRAVENOUS at 19:17

## 2021-01-01 RX ADMIN — METRONIDAZOLE 500 MG: 500 INJECTION, SOLUTION INTRAVENOUS at 18:11

## 2021-01-01 RX ADMIN — FUROSEMIDE 20 MG: 10 INJECTION, SOLUTION INTRAMUSCULAR; INTRAVENOUS at 19:43

## 2021-01-01 RX ADMIN — MEMANTINE HYDROCHLORIDE 5 MG: 5 TABLET, FILM COATED ORAL at 07:58

## 2021-01-01 RX ADMIN — ACETAMINOPHEN 1000 MG: 500 TABLET ORAL at 20:42

## 2021-01-01 RX ADMIN — Medication 10 ML: at 20:44

## 2021-01-01 RX ADMIN — MEMANTINE HYDROCHLORIDE 5 MG: 5 TABLET, FILM COATED ORAL at 10:03

## 2021-01-01 RX ADMIN — CEFTRIAXONE SODIUM 2000 MG: 2 INJECTION, POWDER, FOR SOLUTION INTRAMUSCULAR; INTRAVENOUS at 20:09

## 2021-01-01 RX ADMIN — METRONIDAZOLE 500 MG: 500 INJECTION, SOLUTION INTRAVENOUS at 02:56

## 2021-01-01 RX ADMIN — DEXAMETHASONE SODIUM PHOSPHATE 10 MG: 10 INJECTION INTRAMUSCULAR; INTRAVENOUS at 09:56

## 2021-01-01 RX ADMIN — DONEPEZIL HYDROCHLORIDE 5 MG: 5 TABLET, FILM COATED ORAL at 20:42

## 2021-01-01 RX ADMIN — MAGNESIUM SULFATE HEPTAHYDRATE 1000 MG: 1 INJECTION, SOLUTION INTRAVENOUS at 12:20

## 2021-01-01 RX ADMIN — LEVOTHYROXINE SODIUM 75 MCG: 0.07 TABLET ORAL at 05:50

## 2021-01-01 RX ADMIN — METRONIDAZOLE 500 MG: 500 INJECTION, SOLUTION INTRAVENOUS at 12:01

## 2021-01-01 RX ADMIN — ACETAMINOPHEN 1000 MG: 500 TABLET ORAL at 15:33

## 2021-01-01 RX ADMIN — Medication 5 ML: at 20:44

## 2021-01-01 RX ADMIN — DONEPEZIL HYDROCHLORIDE 5 MG: 5 TABLET, FILM COATED ORAL at 20:41

## 2021-01-01 RX ADMIN — METRONIDAZOLE 500 MG: 500 INJECTION, SOLUTION INTRAVENOUS at 18:51

## 2021-01-01 RX ADMIN — METRONIDAZOLE 500 MG: 500 INJECTION, SOLUTION INTRAVENOUS at 20:41

## 2021-01-01 RX ADMIN — Medication 10 ML: at 21:47

## 2021-01-01 RX ADMIN — LEVOTHYROXINE SODIUM 75 MCG: 0.07 TABLET ORAL at 06:06

## 2021-01-01 RX ADMIN — METRONIDAZOLE 500 MG: 500 INJECTION, SOLUTION INTRAVENOUS at 10:27

## 2021-01-01 RX ADMIN — METRONIDAZOLE 500 MG: 500 INJECTION, SOLUTION INTRAVENOUS at 04:34

## 2021-01-01 RX ADMIN — ACETAMINOPHEN 1000 MG: 500 TABLET ORAL at 08:50

## 2021-01-01 RX ADMIN — CEFTRIAXONE 1000 MG: 1 INJECTION, POWDER, FOR SOLUTION INTRAMUSCULAR; INTRAVENOUS at 19:45

## 2021-01-01 RX ADMIN — MEMANTINE HYDROCHLORIDE 10 MG: 10 TABLET, FILM COATED ORAL at 08:31

## 2021-01-01 RX ADMIN — ACETAMINOPHEN 1000 MG: 500 TABLET ORAL at 14:48

## 2021-01-01 RX ADMIN — METRONIDAZOLE 500 MG: 500 INJECTION, SOLUTION INTRAVENOUS at 03:00

## 2021-01-01 RX ADMIN — Medication 10 ML: at 07:59

## 2021-01-01 RX ADMIN — ACETAMINOPHEN 1000 MG: 500 TABLET ORAL at 15:13

## 2021-01-01 RX ADMIN — SODIUM CHLORIDE 100 MG: 9 INJECTION, SOLUTION INTRAVENOUS at 18:57

## 2021-01-01 RX ADMIN — Medication 5 ML: at 22:14

## 2021-01-01 RX ADMIN — SODIUM CHLORIDE 500 ML: 9 INJECTION, SOLUTION INTRAVENOUS at 19:08

## 2021-01-01 RX ADMIN — LEVOTHYROXINE SODIUM 75 MCG: 0.07 TABLET ORAL at 07:04

## 2021-01-01 RX ADMIN — Medication 0.6 MG: at 12:03

## 2021-01-01 RX ADMIN — ROCURONIUM BROMIDE 5 MG: 10 INJECTION, SOLUTION INTRAVENOUS at 10:54

## 2021-01-01 RX ADMIN — SODIUM CHLORIDE 125 MG: 9 INJECTION, SOLUTION INTRAVENOUS at 21:25

## 2021-01-01 RX ADMIN — CEFTRIAXONE SODIUM 2000 MG: 2 INJECTION, POWDER, FOR SOLUTION INTRAMUSCULAR; INTRAVENOUS at 20:42

## 2021-01-01 RX ADMIN — LEVOTHYROXINE SODIUM 75 MCG: 0.07 TABLET ORAL at 09:51

## 2021-01-01 RX ADMIN — METRONIDAZOLE 500 MG: 500 INJECTION, SOLUTION INTRAVENOUS at 03:17

## 2021-01-01 RX ADMIN — POTASSIUM CHLORIDE 10 MEQ: 10 INJECTION, SOLUTION INTRAVENOUS at 00:55

## 2021-01-01 RX ADMIN — ONDANSETRON HYDROCHLORIDE 4 MG: 2 INJECTION, SOLUTION INTRAMUSCULAR; INTRAVENOUS at 11:31

## 2021-01-01 RX ADMIN — MEMANTINE HYDROCHLORIDE 10 MG: 10 TABLET, FILM COATED ORAL at 08:39

## 2021-01-01 RX ADMIN — IOPAMIDOL 90 ML: 755 INJECTION, SOLUTION INTRAVENOUS at 21:00

## 2021-01-01 RX ADMIN — ACETAMINOPHEN 1000 MG: 500 TABLET ORAL at 21:24

## 2021-01-01 RX ADMIN — DOCUSATE SODIUM 100 MG: 100 CAPSULE ORAL at 09:32

## 2021-01-01 RX ADMIN — DONEPEZIL HYDROCHLORIDE 10 MG: 5 TABLET, FILM COATED ORAL at 20:43

## 2021-01-01 RX ADMIN — DOCUSATE SODIUM 100 MG: 100 CAPSULE ORAL at 20:43

## 2021-01-01 RX ADMIN — DOCUSATE SODIUM 100 MG: 100 CAPSULE ORAL at 21:06

## 2021-01-01 RX ADMIN — ONDANSETRON 4 MG: 4 TABLET, ORALLY DISINTEGRATING ORAL at 16:57

## 2021-01-01 RX ADMIN — SODIUM CHLORIDE 25 MG: 9 INJECTION, SOLUTION INTRAVENOUS at 17:43

## 2021-01-01 RX ADMIN — Medication 5 MG: at 20:09

## 2021-01-01 RX ADMIN — CEFTRIAXONE SODIUM 2000 MG: 2 INJECTION, POWDER, FOR SOLUTION INTRAMUSCULAR; INTRAVENOUS at 21:25

## 2021-01-01 RX ADMIN — FENTANYL CITRATE 50 MCG: 50 INJECTION, SOLUTION INTRAMUSCULAR; INTRAVENOUS at 10:49

## 2021-01-01 RX ADMIN — METRONIDAZOLE 500 MG: 500 INJECTION, SOLUTION INTRAVENOUS at 12:26

## 2021-01-01 RX ADMIN — SODIUM CHLORIDE: 9 INJECTION, SOLUTION INTRAVENOUS at 02:31

## 2021-01-01 RX ADMIN — Medication 10 ML: at 21:07

## 2021-01-01 RX ADMIN — Medication 400 MG: at 08:39

## 2021-01-01 RX ADMIN — SODIUM CHLORIDE, PRESERVATIVE FREE 10 ML: 5 INJECTION INTRAVENOUS at 11:08

## 2021-01-01 RX ADMIN — ACETAMINOPHEN 1000 MG: 500 TABLET ORAL at 08:32

## 2021-01-01 RX ADMIN — SODIUM CHLORIDE: 9 INJECTION, SOLUTION INTRAVENOUS at 09:34

## 2021-01-01 RX ADMIN — CEFTRIAXONE SODIUM 2000 MG: 2 INJECTION, POWDER, FOR SOLUTION INTRAMUSCULAR; INTRAVENOUS at 18:50

## 2021-01-01 RX ADMIN — Medication 10 ML: at 08:39

## 2021-01-01 RX ADMIN — POTASSIUM CHLORIDE 10 MEQ: 10 INJECTION, SOLUTION INTRAVENOUS at 20:44

## 2021-01-01 RX ADMIN — DOCUSATE SODIUM 100 MG: 100 CAPSULE ORAL at 21:45

## 2021-01-01 RX ADMIN — METRONIDAZOLE 500 MG: 500 INJECTION, SOLUTION INTRAVENOUS at 11:07

## 2021-01-01 RX ADMIN — ACETAMINOPHEN 1000 MG: 500 TABLET ORAL at 02:31

## 2021-01-01 RX ADMIN — Medication 5 MG: at 20:44

## 2021-01-01 RX ADMIN — ACETAMINOPHEN 1000 MG: 500 TABLET ORAL at 03:17

## 2021-01-01 RX ADMIN — MEMANTINE HYDROCHLORIDE 10 MG: 10 TABLET, FILM COATED ORAL at 09:32

## 2021-01-01 RX ADMIN — Medication 10 ML: at 12:00

## 2021-01-01 RX ADMIN — LEVOTHYROXINE SODIUM 75 MCG: 0.07 TABLET ORAL at 08:39

## 2021-01-01 RX ADMIN — POTASSIUM CHLORIDE 10 MEQ: 10 INJECTION, SOLUTION INTRAVENOUS at 23:58

## 2021-01-01 RX ADMIN — MEMANTINE HYDROCHLORIDE 5 MG: 5 TABLET, FILM COATED ORAL at 08:40

## 2021-01-01 RX ADMIN — SODIUM CHLORIDE: 9 INJECTION, SOLUTION INTRAVENOUS at 09:22

## 2021-01-01 RX ADMIN — SODIUM CHLORIDE, PRESERVATIVE FREE 10 ML: 5 INJECTION INTRAVENOUS at 18:07

## 2021-01-01 RX ADMIN — SODIUM CHLORIDE 125 MG: 9 INJECTION, SOLUTION INTRAVENOUS at 17:49

## 2021-01-01 RX ADMIN — MAGNESIUM SULFATE HEPTAHYDRATE 2000 MG: 40 INJECTION, SOLUTION INTRAVENOUS at 11:55

## 2021-01-01 RX ADMIN — DONEPEZIL HYDROCHLORIDE 5 MG: 5 TABLET, FILM COATED ORAL at 21:26

## 2021-01-01 RX ADMIN — ACETAMINOPHEN 1000 MG: 500 TABLET ORAL at 14:55

## 2021-01-01 RX ADMIN — DONEPEZIL HYDROCHLORIDE 10 MG: 5 TABLET, FILM COATED ORAL at 21:47

## 2021-01-01 RX ADMIN — ACETAMINOPHEN 1000 MG: 500 TABLET ORAL at 07:40

## 2021-01-01 RX ADMIN — METRONIDAZOLE 500 MG: 500 INJECTION, SOLUTION INTRAVENOUS at 04:17

## 2021-01-01 RX ADMIN — DOCUSATE SODIUM 100 MG: 100 CAPSULE ORAL at 07:58

## 2021-01-01 RX ADMIN — DONEPEZIL HYDROCHLORIDE 10 MG: 5 TABLET, FILM COATED ORAL at 21:07

## 2021-01-01 RX ADMIN — METRONIDAZOLE 500 MG: 500 INJECTION, SOLUTION INTRAVENOUS at 11:14

## 2021-01-01 RX ADMIN — Medication 100 MCG: at 09:42

## 2021-01-01 RX ADMIN — METRONIDAZOLE 500 MG: 500 INJECTION, SOLUTION INTRAVENOUS at 18:06

## 2021-01-01 RX ADMIN — DONEPEZIL HYDROCHLORIDE 5 MG: 5 TABLET, FILM COATED ORAL at 20:44

## 2021-01-01 RX ADMIN — Medication 5 MG: at 21:26

## 2021-01-01 RX ADMIN — FENTANYL CITRATE 50 MCG: 50 INJECTION, SOLUTION INTRAMUSCULAR; INTRAVENOUS at 10:22

## 2021-01-01 RX ADMIN — METRONIDAZOLE 500 MG: 500 INJECTION, SOLUTION INTRAVENOUS at 02:31

## 2021-01-01 RX ADMIN — ROCURONIUM BROMIDE 40 MG: 10 INJECTION, SOLUTION INTRAVENOUS at 09:28

## 2021-01-01 RX ADMIN — CEFTRIAXONE SODIUM 2000 MG: 2 INJECTION, POWDER, FOR SOLUTION INTRAMUSCULAR; INTRAVENOUS at 20:41

## 2021-01-01 RX ADMIN — SODIUM CHLORIDE 125 MG: 9 INJECTION, SOLUTION INTRAVENOUS at 20:44

## 2021-01-01 RX ADMIN — METRONIDAZOLE 500 MG: 500 INJECTION, SOLUTION INTRAVENOUS at 11:08

## 2021-01-01 RX ADMIN — Medication 5 MG: at 09:48

## 2021-01-01 RX ADMIN — DOCUSATE SODIUM 100 MG: 100 CAPSULE ORAL at 21:25

## 2021-01-01 RX ADMIN — ACETAMINOPHEN 1000 MG: 500 TABLET ORAL at 07:58

## 2021-01-01 RX ADMIN — MEMANTINE HYDROCHLORIDE 10 MG: 10 TABLET, FILM COATED ORAL at 07:41

## 2021-01-01 RX ADMIN — POTASSIUM CHLORIDE 40 MEQ: 1500 TABLET, EXTENDED RELEASE ORAL at 11:14

## 2021-01-01 RX ADMIN — Medication 5 MG: at 21:46

## 2021-01-01 RX ADMIN — MEMANTINE HYDROCHLORIDE 5 MG: 5 TABLET, FILM COATED ORAL at 07:41

## 2021-01-01 RX ADMIN — SODIUM CHLORIDE, POTASSIUM CHLORIDE, SODIUM LACTATE AND CALCIUM CHLORIDE 500 ML: 600; 310; 30; 20 INJECTION, SOLUTION INTRAVENOUS at 08:07

## 2021-01-01 RX ADMIN — MEMANTINE HYDROCHLORIDE 5 MG: 5 TABLET, FILM COATED ORAL at 08:51

## 2021-01-01 RX ADMIN — OXYCODONE HYDROCHLORIDE 2.5 MG: 5 TABLET ORAL at 17:02

## 2021-01-01 RX ADMIN — DONEPEZIL HYDROCHLORIDE 10 MG: 5 TABLET, FILM COATED ORAL at 21:25

## 2021-01-01 RX ADMIN — LEVOTHYROXINE SODIUM 75 MCG: 0.07 TABLET ORAL at 10:01

## 2021-01-01 RX ADMIN — ACETAMINOPHEN 1000 MG: 500 TABLET ORAL at 20:43

## 2021-01-01 RX ADMIN — MEMANTINE HYDROCHLORIDE 5 MG: 5 TABLET, FILM COATED ORAL at 08:32

## 2021-01-01 RX ADMIN — SODIUM CHLORIDE 125 MG: 9 INJECTION, SOLUTION INTRAVENOUS at 18:06

## 2021-01-01 ASSESSMENT — PULMONARY FUNCTION TESTS
PIF_VALUE: 16
PIF_VALUE: 24
PIF_VALUE: 17
PIF_VALUE: 15
PIF_VALUE: 17
PIF_VALUE: 18
PIF_VALUE: 15
PIF_VALUE: 14
PIF_VALUE: 15
PIF_VALUE: 15
PIF_VALUE: 16
PIF_VALUE: 15
PIF_VALUE: 15
PIF_VALUE: 14
PIF_VALUE: 16
PIF_VALUE: 17
PIF_VALUE: 14
PIF_VALUE: 15
PIF_VALUE: 16
PIF_VALUE: 15
PIF_VALUE: 14
PIF_VALUE: 15
PIF_VALUE: 0
PIF_VALUE: 15
PIF_VALUE: 17
PIF_VALUE: 15
PIF_VALUE: 14
PIF_VALUE: 14
PIF_VALUE: 16
PIF_VALUE: 14
PIF_VALUE: 16
PIF_VALUE: 15
PIF_VALUE: 16
PIF_VALUE: 17
PIF_VALUE: 18
PIF_VALUE: 14
PIF_VALUE: 17
PIF_VALUE: 16
PIF_VALUE: 15
PIF_VALUE: 14
PIF_VALUE: 15
PIF_VALUE: 14
PIF_VALUE: 2
PIF_VALUE: 15
PIF_VALUE: 17
PIF_VALUE: 17
PIF_VALUE: 15
PIF_VALUE: 14
PIF_VALUE: 16
PIF_VALUE: 15
PIF_VALUE: 17
PIF_VALUE: 16
PIF_VALUE: 14
PIF_VALUE: 17
PIF_VALUE: 16
PIF_VALUE: 15
PIF_VALUE: 13
PIF_VALUE: 16
PIF_VALUE: 17
PIF_VALUE: 15
PIF_VALUE: 17
PIF_VALUE: 15
PIF_VALUE: 15
PIF_VALUE: 17
PIF_VALUE: 14
PIF_VALUE: 18
PIF_VALUE: 17
PIF_VALUE: 15
PIF_VALUE: 20
PIF_VALUE: 15
PIF_VALUE: 17
PIF_VALUE: 17
PIF_VALUE: 15
PIF_VALUE: 14
PIF_VALUE: 15
PIF_VALUE: 14
PIF_VALUE: 15
PIF_VALUE: 16
PIF_VALUE: 14
PIF_VALUE: 15
PIF_VALUE: 14
PIF_VALUE: 15
PIF_VALUE: 15
PIF_VALUE: 14
PIF_VALUE: 15
PIF_VALUE: 2
PIF_VALUE: 16
PIF_VALUE: 17
PIF_VALUE: 0
PIF_VALUE: 15
PIF_VALUE: 15
PIF_VALUE: 14
PIF_VALUE: 15
PIF_VALUE: 17
PIF_VALUE: 15
PIF_VALUE: 17
PIF_VALUE: 17
PIF_VALUE: 15
PIF_VALUE: 15
PIF_VALUE: 17
PIF_VALUE: 18
PIF_VALUE: 17
PIF_VALUE: 15
PIF_VALUE: 16
PIF_VALUE: 17
PIF_VALUE: 14
PIF_VALUE: 15
PIF_VALUE: 15
PIF_VALUE: 2
PIF_VALUE: 15
PIF_VALUE: 13
PIF_VALUE: 15
PIF_VALUE: 16
PIF_VALUE: 15
PIF_VALUE: 17
PIF_VALUE: 18
PIF_VALUE: 15
PIF_VALUE: 15
PIF_VALUE: 14
PIF_VALUE: 18
PIF_VALUE: 15
PIF_VALUE: 17
PIF_VALUE: 15
PIF_VALUE: 15
PIF_VALUE: 17
PIF_VALUE: 16
PIF_VALUE: 2
PIF_VALUE: 15
PIF_VALUE: 15
PIF_VALUE: 14
PIF_VALUE: 10
PIF_VALUE: 15
PIF_VALUE: 2
PIF_VALUE: 15
PIF_VALUE: 15
PIF_VALUE: 16
PIF_VALUE: 14
PIF_VALUE: 17
PIF_VALUE: 5
PIF_VALUE: 15
PIF_VALUE: 15
PIF_VALUE: 17
PIF_VALUE: 16
PIF_VALUE: 16
PIF_VALUE: 15
PIF_VALUE: 14

## 2021-01-01 ASSESSMENT — PAIN SCALES - PAIN ASSESSMENT IN ADVANCED DEMENTIA (PAINAD)
BREATHING: 0
TOTALSCORE: 4
NEGVOCALIZATION: 0
TOTALSCORE: 0
TOTALSCORE: 0
NEGVOCALIZATION: 0
BREATHING: 0
TOTALSCORE: 0
TOTALSCORE: 0
BODYLANGUAGE: 0
BODYLANGUAGE: 0
TOTALSCORE: 0
CONSOLABILITY: 0
CONSOLABILITY: 0
NEGVOCALIZATION: 0
FACIALEXPRESSION: 2
CONSOLABILITY: 0
BREATHING: 0
FACIALEXPRESSION: 0
CONSOLABILITY: 0
BREATHING: 0
FACIALEXPRESSION: 0
BREATHING: 0
CONSOLABILITY: 0
TOTALSCORE: 2
BODYLANGUAGE: 0
BODYLANGUAGE: 0
BREATHING: 0
TOTALSCORE: 0
FACIALEXPRESSION: 0
BODYLANGUAGE: 0
FACIALEXPRESSION: 2
CONSOLABILITY: 0
BODYLANGUAGE: 0
NEGVOCALIZATION: 0
TOTALSCORE: 0
NEGVOCALIZATION: 0
NEGVOCALIZATION: 0
NEGVOCALIZATION: 1
BREATHING: 0
BREATHING: 0
NEGVOCALIZATION: 0
CONSOLABILITY: 0
CONSOLABILITY: 0
TOTALSCORE: 0
BODYLANGUAGE: 0
FACIALEXPRESSION: 0
FACIALEXPRESSION: 0
CONSOLABILITY: 0
NEGVOCALIZATION: 0
FACIALEXPRESSION: 0
NEGVOCALIZATION: 0
BODYLANGUAGE: 0
BREATHING: 0
NEGVOCALIZATION: 0
FACIALEXPRESSION: 0
BODYLANGUAGE: 1
BODYLANGUAGE: 0
FACIALEXPRESSION: 0
CONSOLABILITY: 0
FACIALEXPRESSION: 0
BREATHING: 0
BREATHING: 0
TOTALSCORE: 0
CONSOLABILITY: 0
BODYLANGUAGE: 0

## 2021-01-01 ASSESSMENT — PAIN SCALES - GENERAL
PAINLEVEL_OUTOF10: 0
PAINLEVEL_OUTOF10: 5
PAINLEVEL_OUTOF10: 0
PAINLEVEL_OUTOF10: 6
PAINLEVEL_OUTOF10: 0
PAINLEVEL_OUTOF10: 8
PAINLEVEL_OUTOF10: 0
PAINLEVEL_OUTOF10: 0
PAINLEVEL_OUTOF10: 6
PAINLEVEL_OUTOF10: 0
PAINLEVEL_OUTOF10: 5
PAINLEVEL_OUTOF10: 6
PAINLEVEL_OUTOF10: 5
PAINLEVEL_OUTOF10: 0
PAINLEVEL_OUTOF10: 0
PAINLEVEL_OUTOF10: 5
PAINLEVEL_OUTOF10: 5
PAINLEVEL_OUTOF10: 0
PAINLEVEL_OUTOF10: 5
PAINLEVEL_OUTOF10: 0
PAINLEVEL_OUTOF10: 4
PAINLEVEL_OUTOF10: 0
PAINLEVEL_OUTOF10: 0
PAINLEVEL_OUTOF10: 1

## 2021-01-01 ASSESSMENT — ENCOUNTER SYMPTOMS
EYE PAIN: 0
NAUSEA: 0
EYE PAIN: 0
EYE DISCHARGE: 0
EYE DISCHARGE: 0
WHEEZING: 0
EYE REDNESS: 0
ABDOMINAL PAIN: 0
DIARRHEA: 0
SHORTNESS OF BREATH: 1
VOMITING: 0
SORE THROAT: 0
ABDOMINAL PAIN: 0
COUGH: 1
SINUS PRESSURE: 0
VOMITING: 0
ABDOMINAL DISTENTION: 0
BACK PAIN: 0
SINUS PRESSURE: 0
SORE THROAT: 0
EYE REDNESS: 0
COUGH: 0
DIARRHEA: 0
WHEEZING: 0
BACK PAIN: 0
SHORTNESS OF BREATH: 0
NAUSEA: 0

## 2021-01-01 ASSESSMENT — PAIN DESCRIPTION - PAIN TYPE: TYPE: ACUTE PAIN

## 2021-01-01 ASSESSMENT — PATIENT HEALTH QUESTIONNAIRE - PHQ9
2. FEELING DOWN, DEPRESSED OR HOPELESS: 0
1. LITTLE INTEREST OR PLEASURE IN DOING THINGS: 0
SUM OF ALL RESPONSES TO PHQ QUESTIONS 1-9: 0

## 2021-01-01 ASSESSMENT — LIFESTYLE VARIABLES: SMOKING_STATUS: 0

## 2021-01-01 ASSESSMENT — PAIN DESCRIPTION - LOCATION: LOCATION: GENERALIZED

## 2021-01-30 NOTE — CARE COORDINATION
Patient calling requesting  refill for Norco 10/325. Patient was given Norco script on 1/25/21, 30 tabs. Patient states due to his headache he has been taking 1.5 tabs every 4 hours instead of one tablet. Patient also taking Topamax and Ibuprofen. Social Work:    Reviewed PT/OT notes and placed a call to daughter Paola Mittal. She and her sister were considering choices and inquired about [address], [place], and [address]. [address] does not contract with [employer]. [facility] did accept, however, Paola Mittal and her family now prefer to return home with [facility], preferably [facility]. Paola Mittal advised that her sister is coming in from [place] to help her care for her mother. Social work called [address] [facility] and they do not contract with [employer]. Paola Mittal was advised about Maxene Band choices and preference is [facility]. Social work called a referral to Harjinder Partida at [facility]. Brenda requested a hospital bed, [employer], 3-1 commode, and ETB. Choices for DME were provided and she has no preference. A referral was given to [address] [place] [address],[address] at [facility]. Orders for CPA, med compliance, PT/OT, home aide were prompted for. Electronically signed by BUDDY Shearer on 6/19/2020 at 11:11 AM     Addendum:    Received a call back from daughter Paola Mittal requesting to cancel the wheeled walker and instead obtain a wheelchair. Brenda plans to borrow her father-in-laws walker. She also requested delivery of all items to her home. Finally, Paola Mittal would also like a private ambulette ride home upon discharge.       Electronically signed by BUDDY Shearer on 6/19/2020 at 11:17 AM

## 2021-04-23 NOTE — ED PROVIDER NOTES
Chief Complaint   Patient presents with    Chest Pain     with deep breathing, fell earlier       Started is 27-year-old female who presents today for chest pain and fall. Daughter is at bedside, states she was getting off of the bus from [employer] , at the last step, started to miss her footing, causing her to fall. Family was standing nearby, they did catch her, she did not hit her head or lose consciousness. She started complain of chest pain after this event. She denies shortness of breath, lightheadedness or dizziness. Denies numbness, tingling, weakness in extremities. No previous cardiac history. Chest pain is reproducible. The history is provided by the patient. No  was used. Review of Systems   Constitutional: Negative for chills and fever. HENT: Negative for ear pain, sinus pressure and sore throat. Eyes: Negative for pain, discharge and redness. Respiratory: Negative for cough, shortness of breath and wheezing. Cardiovascular: Positive for chest pain. Negative for palpitations and leg swelling. Gastrointestinal: Negative for abdominal distention, abdominal pain, diarrhea, nausea and vomiting. Genitourinary: Negative for dysuria and frequency. Musculoskeletal: Negative for arthralgias and back pain. Skin: Negative for rash and wound. Neurological: Negative for dizziness, syncope, weakness and headaches. Hematological: Negative for adenopathy. Psychiatric/Behavioral: Negative for behavioral problems and confusion. All other systems reviewed and are negative. Physical Exam  Vitals signs and nursing note reviewed. Constitutional:       General: She is not in acute distress. Appearance: Normal appearance. She is well-developed. She is not ill-appearing or diaphoretic. HENT:      Head: Normocephalic and atraumatic. Eyes:      Pupils: Pupils are equal, round, and reactive to light.    Neck:      Musculoskeletal: Normal range of motion and neck supple. Cardiovascular:      Rate and Rhythm: Normal rate and regular rhythm. Pulses: Normal pulses. Heart sounds: Normal heart sounds. Pulmonary:      Effort: Pulmonary effort is normal. No respiratory distress. Breath sounds: Normal breath sounds. No wheezing or rales. Abdominal:      General: Bowel sounds are normal.      Palpations: Abdomen is soft. Tenderness: There is no abdominal tenderness. There is no guarding or rebound. Musculoskeletal: Normal range of motion. General: No tenderness. Right lower leg: No edema. Left lower leg: No edema. Skin:     General: Skin is warm and dry. Capillary Refill: Capillary refill takes less than 2 seconds. Neurological:      General: No focal deficit present. Mental Status: She is alert and oriented to person, place, and time. Cranial Nerves: No cranial nerve deficit. Coordination: Coordination normal.   Psychiatric:         Mood and Affect: Mood normal.         Behavior: Behavior normal.          Procedures     Labs Reviewed   CBC WITH AUTO DIFFERENTIAL - Abnormal; Notable for the following components:       Result Value    RBC 3.49 (*)     Hemoglobin 9.9 (*)     Hematocrit 32.8 (*)     MCHC 30.2 (*)     Neutrophils % 83.1 (*)     Lymphocytes % 9.0 (*)     Lymphocytes Absolute 0.67 (*)     All other components within normal limits   BASIC METABOLIC PANEL W/ REFLEX TO MG FOR LOW K - Abnormal; Notable for the following components:    Glucose 116 (*)     CREATININE 1.2 (*)     All other components within normal limits   TROPONIN   BRAIN NATRIURETIC PEPTIDE     CT Head WO Contrast   Final Result   No acute intracranial hemorrhage or edema. XR CHEST PORTABLE   Final Result   No acute process. Mild cardiomegaly. EKG #1:  I personally interpreted this EKG  Time:  1817    Rate: 72  Rhythm: Sinus. Interpretation: normal EKG, normal sinus rhythm.       MDM  Number of 11.5 - 15.5 g/dL    Hematocrit 32.8 (L) 34.0 - 48.0 %    MCV 94.0 80.0 - 99.9 fL    MCH 28.4 26.0 - 35.0 pg    MCHC 30.2 (L) 32.0 - 34.5 %    RDW 15.0 11.5 - 15.0 fL    Platelets 246 864 - 299 E9/L    MPV 10.5 7.0 - 12.0 fL    Neutrophils % 83.1 (H) 43.0 - 80.0 %    Immature Granulocytes % 0.4 0.0 - 5.0 %    Lymphocytes % 9.0 (L) 20.0 - 42.0 %    Monocytes % 5.9 2.0 - 12.0 %    Eosinophils % 1.3 0.0 - 6.0 %    Basophils % 0.3 0.0 - 2.0 %    Neutrophils Absolute 6.22 1.80 - 7.30 E9/L    Immature Granulocytes # 0.03 E9/L    Lymphocytes Absolute 0.67 (L) 1.50 - 4.00 E9/L    Monocytes Absolute 0.44 0.10 - 0.95 E9/L    Eosinophils Absolute 0.10 0.05 - 0.50 E9/L    Basophils Absolute 0.02 0.00 - 0.20 I9/O   Basic Metabolic Panel w/ Reflex to MG   Result Value Ref Range    Sodium 140 132 - 146 mmol/L    Potassium reflex Magnesium 4.3 3.5 - 5.0 mmol/L    Chloride 103 98 - 107 mmol/L    CO2 27 22 - 29 mmol/L    Anion Gap 10 7 - 16 mmol/L    Glucose 116 (H) 74 - 99 mg/dL    BUN 23 6 - 23 mg/dL    CREATININE 1.2 (H) 0.5 - 1.0 mg/dL    GFR Non-African American 43 >=60 mL/min/1.73    GFR African American 52     Calcium 8.9 8.6 - 10.2 mg/dL   Troponin   Result Value Ref Range    Troponin <0.01 0.00 - 0.03 ng/mL   Brain Natriuretic Peptide   Result Value Ref Range    Pro- 0 - 450 pg/mL   EKG 12 Lead   Result Value Ref Range    Ventricular Rate 72 BPM    Atrial Rate 72 BPM    P-R Interval 188 ms    QRS Duration 92 ms    Q-T Interval 366 ms    QTc Calculation (Bazett) 400 ms    P Axis 38 degrees    R Axis -27 degrees    T Axis 33 degrees       Radiology:  CT Head WO Contrast   Final Result   No acute intracranial hemorrhage or edema. XR CHEST PORTABLE   Final Result   No acute process. Mild cardiomegaly.              ------------------------- NURSING NOTES AND VITALS REVIEWED ---------------------------  Date / Time Roomed:  4/23/2021  5:41 PM  ED Bed Assignment:  12/12    The nursing notes within the ED encounter and vital signs as below have been reviewed. BP (!) 164/80   Pulse 82   Temp 97.8 °F (36.6 °C) (Oral)   Resp 16   Ht 5' 3.5\" (1.613 m)   Wt 165 lb (74.8 kg)   SpO2 93%   BMI 28.77 kg/m²   Oxygen Saturation Interpretation: Normal      ------------------------------------------ PROGRESS NOTES ------------------------------------------  I have spoken with the patient and discussed todays results, in addition to providing specific details for the plan of care and counseling regarding the diagnosis and prognosis. Their questions are answered at this time and they are agreeable with the plan. I discussed at length with them reasons for immediate return here for re evaluation. They will followup with primary care by calling their office tomorrow. --------------------------------- ADDITIONAL PROVIDER NOTES ---------------------------------  At this time the patient is without objective evidence of an acute process requiring hospitalization or inpatient management. They have remained hemodynamically stable throughout their entire ED visit and are stable for discharge with outpatient follow-up. The plan has been discussed in detail and they are aware of the specific conditions for emergent return, as well as the importance of follow-up. New Prescriptions    No medications on file       Diagnosis:  1. Musculoskeletal chest pain    2. Fall from ground level        Disposition:  Patient's disposition: Discharge to home  Patient's condition is stable. Paco Greene DO  Resident  04/23/21 2000    ATTENDING PROVIDER ATTESTATION:     I have personally performed and/or participated in the history, exam, medical decision making, and procedures and agree with all pertinent clinical information unless otherwise noted. I have also reviewed and agree with the past medical, family and social history unless otherwise noted.     I have discussed this patient in detail with the resident and provided the instruction and education regarding the evidence-based evaluation and treatment of Chest Pain (with deep breathing, fell earlier)      My plan: Symptomatic and supportive care. Patient seen and examined. Patient symptoms seem more likely be related to musculoskeletal chest pain is description by family is the patient stumbled coming out of a bus coming home from [place]  and fell she was caught by the family members and did not hit her head but they did reach under her and possibly cause some mild chest trauma. Patient was denying lightheadedness or dizziness prior to this fall it sounded mechanical based on family's witness description. We did do a cardiac work-up just to evaluate for possible cardiac etiology but based off patient's story it sounds less likely. These labs are found to be negative. Patient had exquisite tenderness across the chest wall further bleeding are concerns that this is musculoskeletal in nature. After review of work-up it is felt patient can be safely discharged patient was discharged with outpatient follow-up.     Electronically signed by Eli Thomas DO on 4/24/21 at 10:04 AM EDT         Eli Thomas DO  04/24/21 [id number]

## 2021-04-26 NOTE — PROGRESS NOTES
CC Memory check up  HYM? \"Not good\"  Knows she has 8 kids and 6 girls     Staying with daughter  And sleeping well about 12 hours  Corrina Clinton again and fell coming from [employer] 3 days ago  At home and feet gave out in [address] shot change   BR issues foer about a year   ADL some coaching and help  BB OK on   Low dose Fe   Right hand tremor , 3 weeks ago  Brother shakes   Meds at Aricept 15 mg and Namenda 15 mg a day   House signs   Born on [address] lives close to [place] and that was her old   House   Not depressed   Impression;Mild Alzheimer's Disease   Plan: Continue Aricept 15 mg and Namenda 15 mg a day

## 2021-06-01 NOTE — TELEPHONE ENCOUNTER
Called back and left  Message on machine regarding advice on how to get Mol to sleep. \, consider giving aricept in AM rather than night since it may be exciting her. Call back when switch accomplished with results.

## 2021-06-23 NOTE — ED PROVIDER NOTES
Patient is 37-year-old female with a history of dementia presenting emergency department with shortness of breath and fatigue. She states the symptoms been ongoing for the last few days, she has a cough as well. Patient has a history of frequent UTIs apparently, was alert and oriented x2 upon examination, history largely provided by the patient's daughter. Symptoms were gradual onset, persistent, nothing makes it better or worse, shortness of breath associated with cough, symptoms have been constant. No fevers or chills, does have a foul smell to her urine. Review of Systems   Constitutional: Positive for fatigue. Negative for chills and fever. HENT: Negative for ear pain, sinus pressure and sore throat. Eyes: Negative for pain, discharge and redness. Respiratory: Positive for cough and shortness of breath. Negative for wheezing. Cardiovascular: Negative for chest pain. Gastrointestinal: Negative for abdominal pain, diarrhea, nausea and vomiting. Genitourinary: Negative for dysuria and frequency. Foul-smelling urine   Musculoskeletal: Negative for arthralgias and back pain. Skin: Negative for rash and wound. Neurological: Negative for weakness and headaches. Hematological: Negative for adenopathy. All other systems reviewed and are negative. Physical Exam  Vitals and nursing note reviewed. Constitutional:       Appearance: Normal appearance. HENT:      Head: Normocephalic and atraumatic. Right Ear: External ear normal.      Left Ear: External ear normal.      Nose: Nose normal.      Mouth/Throat:      Mouth: Mucous membranes are moist.   Eyes:      Extraocular Movements: Extraocular movements intact. Pupils: Pupils are equal, round, and reactive to light. Cardiovascular:      Rate and Rhythm: Normal rate and regular rhythm. Pulses: Normal pulses. Heart sounds: Normal heart sounds.    Pulmonary:      Effort: Pulmonary effort is normal. No respiratory distress. Breath sounds: Normal breath sounds. No stridor. No wheezing or rhonchi. Abdominal:      General: Abdomen is flat. Bowel sounds are normal. There is no distension. Palpations: Abdomen is soft. Tenderness: There is no abdominal tenderness. There is no guarding. Musculoskeletal:         General: Normal range of motion. Cervical back: Normal range of motion and neck supple. Skin:     General: Skin is warm and dry. Neurological:      General: No focal deficit present. Mental Status: She is alert. Cranial Nerves: No cranial nerve deficit. Comments: Oriented x2          Procedures     MDM     ED Course as of Jun 23 1757 Wed Jun 23, 2021 0928 EKG: This EKG is signed by emergency department physician. Rate: 74  Rhythm: Sinus  Interpretation: no acute changes  Comparison: was normal         [JG]   1311 We will have to obtain a third troponin as both troponins were over 12    [JG]   1603 Delta troponin was 1. Will discharge patient home, she did not get hypoxic with ambulation, chest x-ray was clear, work-up here relatively negative. [JG]   7469 Patient presented emergency department for shortness of breath, fatigue, and altered mental status. Work-up was negative, delta troponins obtained, difference was 1, chest x-ray was clear, she got hypoxic with ambulation. She was discharged home, instructed follow-up with primary care physician, return precautions given. [JG]      ED Course User Index  [JG] Sara Holguin MD      Patient presented emergency department for shortness of breath, fatigue, and altered mental status. Work-up was negative, delta troponins obtained, difference was 1, chest x-ray was clear, she got hypoxic with ambulation. She was discharged home, instructed follow-up with primary care physician, return precautions given. ED Course as of Jun 23 1758 Wed Jun 23, 2021 0928 EKG:   This EKG is signed by emergency department physician. Rate: 74  Rhythm: Sinus  Interpretation: no acute changes  Comparison: was normal         [JG]   1311 We will have to obtain a third troponin as both troponins were over 12    [JG]   1603 Delta troponin was 1. Will discharge patient home, she did not get hypoxic with ambulation, chest x-ray was clear, work-up here relatively negative. [JG]   6058 Patient presented emergency department for shortness of breath, fatigue, and altered mental status. Work-up was negative, delta troponins obtained, difference was 1, chest x-ray was clear, she got hypoxic with ambulation. She was discharged home, instructed follow-up with primary care physician, return precautions given. [JG]      ED Course User Index  [JG] Alondra Amador MD       --------------------------------------------- PAST HISTORY ---------------------------------------------  Past Medical History:  has a past medical history of Diabetes ([facility] [address] and Thyroid disease. Past Surgical History:  has a past surgical history that includes Hip fracture surgery (Left, 6/18/2020). Social History:  reports that she quit smoking about 64 years ago. She started smoking about 65 years ago. She has a 0.30 pack-year smoking history. She has never used smokeless tobacco. She reports current alcohol use. She reports that she does not use drugs. Family History: family history is not on file. The patients home medications have been reviewed.     Allergies: Seasonal    -------------------------------------------------- RESULTS -------------------------------------------------  Labs:  Results for orders placed or performed during the hospital encounter of 06/23/21   COVID-19, Rapid    Specimen: Nasopharyngeal Swab   Result Value Ref Range    SARS-CoV-2, NAAT Not Detected Not Detected   CBC Auto Differential   Result Value Ref Range    WBC 5.9 4.5 - 11.5 E9/L    RBC 3.27 (L) 3.50 - 5.50 E12/L    Hemoglobin 9.2 (L) 11.5 - 15.5 g/dL    Hematocrit 30.5 (L) 34.0 - 48.0 %    MCV 93.3 80.0 - 99.9 fL    MCH 28.1 26.0 - 35.0 pg    MCHC 30.2 (L) 32.0 - 34.5 %    RDW 14.7 11.5 - 15.0 fL    Platelets 635 260 - 624 E9/L    MPV 9.9 7.0 - 12.0 fL    Neutrophils % 78.5 43.0 - 80.0 %    Immature Granulocytes % 0.5 0.0 - 5.0 %    Lymphocytes % 12.4 (L) 20.0 - 42.0 %    Monocytes % 6.9 2.0 - 12.0 %    Eosinophils % 1.4 0.0 - 6.0 %    Basophils % 0.3 0.0 - 2.0 %    Neutrophils Absolute 4.63 1.80 - 7.30 E9/L    Immature Granulocytes # 0.03 E9/L    Lymphocytes Absolute 0.73 (L) 1.50 - 4.00 E9/L    Monocytes Absolute 0.41 0.10 - 0.95 E9/L    Eosinophils Absolute 0.08 0.05 - 0.50 E9/L    Basophils Absolute 0.02 0.00 - 0.20 Q5/L   Basic Metabolic Panel w/ Reflex to MG   Result Value Ref Range    Sodium 140 132 - 146 mmol/L    Potassium reflex Magnesium 4.0 3.5 - 5.0 mmol/L    Chloride 104 98 - 107 mmol/L    CO2 27 22 - 29 mmol/L    Anion Gap 9 7 - 16 mmol/L    Glucose 162 (H) 74 - 99 mg/dL    BUN 20 6 - 23 mg/dL    CREATININE 1.1 (H) 0.5 - 1.0 mg/dL    GFR Non-African American 47 >=60 mL/min/1.73    GFR African American 57     Calcium 9.4 8.6 - 10.2 mg/dL   Hepatic Function Panel   Result Value Ref Range    Total Protein 6.9 6.4 - 8.3 g/dL    Albumin 3.5 3.5 - 5.2 g/dL    Alkaline Phosphatase 88 35 - 104 U/L    ALT 7 0 - 32 U/L    AST 15 0 - 31 U/L    Total Bilirubin <0.2 0.0 - 1.2 mg/dL    Bilirubin, Direct <0.2 0.0 - 0.3 mg/dL    Bilirubin, Indirect see below 0.0 - 1.0 mg/dL   Troponin   Result Value Ref Range    Troponin, High Sensitivity 14 (H) 0 - 9 ng/L   Brain Natriuretic Peptide   Result Value Ref Range    Pro- (H) 0 - 450 pg/mL   Urinalysis, reflex to microscopic   Result Value Ref Range    Color, UA Yellow Straw/Yellow    Clarity, UA Clear Clear    Glucose, Ur Negative Negative mg/dL    Bilirubin Urine Negative Negative    Ketones, Urine Negative Negative mg/dL    Specific Gravity, UA 1.010 1.005 - 1.030    Blood, Urine Negative Negative    pH, UA 6.5 5.0 - 9.0    Protein, UA Negative Negative mg/dL    Urobilinogen, Urine 0.2 <2.0 E.U./dL    Nitrite, Urine Negative Negative    Leukocyte Esterase, Urine Negative Negative   Troponin   Result Value Ref Range    Troponin, High Sensitivity 13 (H) 0 - 9 ng/L   Troponin   Result Value Ref Range    Troponin, High Sensitivity 13 (H) 0 - 9 ng/L   EKG 12 Lead   Result Value Ref Range    Ventricular Rate 74 BPM    Atrial Rate 74 BPM    P-R Interval 172 ms    QRS Duration 90 ms    Q-T Interval 392 ms    QTc Calculation (Bazett) 435 ms    P Axis 24 degrees    R Axis -18 degrees    T Axis 31 degrees       Radiology:  CT Head WO Contrast   Final Result   1. There is no acute intracranial abnormality. Specifically, there is no   intracranial hemorrhage. 2. Atrophy and periventricular leukomalacia,         XR CHEST PORTABLE   Final Result   No acute cardiopulmonary process. ------------------------- NURSING NOTES AND VITALS REVIEWED ---------------------------  Date / Time Roomed:  6/23/2021  8:11 AM  ED Bed Assignment:  03/03    The nursing notes within the ED encounter and vital signs as below have been reviewed. BP (!) 168/96   Pulse 67   Temp 98.2 °F (36.8 °C)   Resp 18   Ht 5' 4\" (1.626 m)   Wt 163 lb (73.9 kg)   SpO2 96%   BMI 27.98 kg/m²   Oxygen Saturation Interpretation: Normal      ------------------------------------------ PROGRESS NOTES ------------------------------------------  5:58 PM EDT  I have spoken with the patient and Daughters and discussed todays results, in addition to providing specific details for the plan of care and counseling regarding the diagnosis and prognosis. Their questions are answered at this time and they are agreeable with the plan. I discussed at length with them reasons for immediate return here for re evaluation. They will followup with their primary care physician by calling their office tomorrow.       --------------------------------- ADDITIONAL PROVIDER NOTES ---------------------------------  At this time the patient is without objective evidence of an acute process requiring hospitalization or inpatient management. They have remained hemodynamically stable throughout their entire ED visit and are stable for discharge with outpatient follow-up. The plan has been discussed in detail and they are aware of the specific conditions for emergent return, as well as the importance of follow-up. Discharge Medication List as of 6/23/2021  4:06 PM          Diagnosis:  1. Shortness of breath    2. Other fatigue        Disposition:  Patient's disposition: Discharge to home  Patient's condition is stable.           Sam Wood MD  Resident  06/23/21 [facility]on [facility]

## 2021-06-23 NOTE — ED NOTES
Patient ambulated in hallway.  Spo2 remained above 98% RA and HR 80     Amanda Mccain, [facility]  06/23/21 7163

## 2021-07-05 PROBLEM — J96.91 RESPIRATORY FAILURE WITH HYPOXIA (HCC): Status: ACTIVE | Noted: 2021-01-01

## 2021-07-05 NOTE — LETTER
[place] Department Medicaid  CERTIFICATION OF NECESSITY  FOR NON-EMERGENCY TRANSPORTATION   BY GROUND AMBULANCE      Individual Information   1. Name: Oskar Linn 2. [place] Medicaid Billing Number:    3. Address: [place] [address]      Transportation Provider Information   4. Provider Name: Spencer Terrazas   5. [place] Medicaid Provider Number:  National Provider Identifier (NPI):      Certification  7. Criteria:  During transport, this individual requires:  [x] Medical treatment or continuous     supervision by an EMT. [] The administration or regulation of oxygen by another person. [] Supervised protective restraint. 8. Period Beginning Date: 7/13/2021     9. Length  [x] Not more than 10 day(s)  [] One Year     Additional Information Relevant to Certification   10. Comments or Explanations, If Necessary or Appropriate   significant dementia  Nonambulatory  POD#4 EXPLORATORY LAPAROTOMY, RIGHT TYLER COLECTOMY, LOOP COLOSTOMY & TRANSVERSUS ABDOMONIS BLOCK for Colon Cancer. Certifying Practitioner Information   11. Name of Practitioner: Bishop Ruth Ann JUAREZ   12. [place] Medicaid Provider Number, If Applicable:  [id number] Provider Identifier (NPI):      Signature Information   14. Date of Signature: 7/13/2021   15. Name of Person Signing: Vamshi Musa RN CM     16. Signature and Professional Designation: Vamshi Musa RN CM       OD 71002  Rev. 7/2015  [address] [place] [address] Encounter Date/Time: 7/5/2021 [facility] Account: [de-identified]    MRN: [id number]    Patient: Oskar Linn    Contact Serial #: [id number]      ENCOUNTER          Patient Class: I Private Enc?   No Unit  BD: Devin 1921 Service: Med/Surg   Encounter DX: Respiratory failure with*   ADM Provider: Bishop Ruth Ann MD   Procedure:     ATT Provider: Bishop Ruth Ann MD   REF Provider:        Admission DX: Respiratory failure with hypoxia [facility]) and DX codes: J96.91      PATIENT                 Name: Jaelyn Mohamud

## 2021-07-05 NOTE — ED PROVIDER NOTES
ED PROVIDER NOTE    Chief Complaint   Patient presents with    Shortness of Breath     pulse ox 87 at RA at home     Abdominal Pain     ab pain, taking Iron at home, dark stools        HPI:  21,   Time: 3:07 PM EDT       Debra Powers is a 82 y.o. female presenting to the ED for shortness of breath and abdominal pain. History is very limited from patient due to dementia. She does state that she is here for shortness of breath. EMS reports that family called and reported large dark bowel movement at home after taking miralax for constipation. EMS noted SpO2 87% on room air. She is unable to state whether she has fever, chills, nausea, vomiting, chest pain, cough. Collateral history obtained from daughter. She states patient has had generalized weakness and fatigue, complaining of abdominal pain, intermittently very confused beyond her normal baseline, and barely able to walk. None of this is normal for her. Chart review: hx of DM, dementia    Review of Systems:     Review of Systems   Unable to perform ROS: Dementia         --------------------------------------------- PAST HISTORY ---------------------------------------------  Past Medical History:   Past Medical History:   Diagnosis Date    Diabetes ([facility] [address]     Thyroid disease        Past Surgical History:   Past Surgical History:   Procedure Laterality Date    HIP FRACTURE SURGERY Left 2020    HIP OPEN REDUCTION INTERNAL FIXATION performed by Amee Esteban MD at [address] [place] [address] History:   Social History     Socioeconomic History    Marital status:       Spouse name: None    Number of children: None    Years of education: None    Highest education level: None   Occupational History    None   Tobacco Use    Smoking status: Former Smoker     Packs/day: 0.30     Years: 1.00     Pack years: 0.30     Start date: 1956     Quit date: 1957     Years since quittin.1    Smokeless tobacco: Never Used   Vaping Use    Vaping Cardiovascular:      Rate and Rhythm: Normal rate and regular rhythm. Pulses: Normal pulses. Heart sounds: Normal heart sounds. No murmur heard. Pulmonary:      Effort: Pulmonary effort is normal. No respiratory distress. Breath sounds: Normal breath sounds. No wheezing or rales. Comments: Tachypnea. No accessory muscle use  Abdominal:      General: There is no distension. Palpations: Abdomen is soft. Tenderness: There is abdominal tenderness (diffuse nonfocal). There is no guarding or rebound. Musculoskeletal:         General: No swelling or tenderness. Normal range of motion. Cervical back: Normal range of motion and neck supple. No rigidity. Comments: Radial, DP, and PT pulses 2+ bilaterally. Skin:     General: Skin is warm and dry. Neurological:      Mental Status: She is alert and oriented to person, place, and time. Comments: Global weakness. Strength 4/5 and sensation grossly intact to light touch and equal bilaterally throughout all extremities             -------------------------------------------------- RESULTS -------------------------------------------------  I have personally reviewed all laboratory and imaging results for this patient. Results are listed below.      LABS:  Labs Reviewed   CBC WITH AUTO DIFFERENTIAL - Abnormal; Notable for the following components:       Result Value    WBC 13.9 (*)     RBC 3.07 (*)     Hemoglobin 8.5 (*)     Hematocrit 27.1 (*)     MCHC 31.4 (*)     RDW 15.9 (*)     Neutrophils % 97.4 (*)     Lymphocytes % 4.0 (*)     Monocytes % 1.7 (*)     Neutrophils Absolute 13.48 (*)     Lymphocytes Absolute 0.00 (*)     Eosinophils Absolute 0.00 (*)     All other components within normal limits   COMPREHENSIVE METABOLIC PANEL - Abnormal; Notable for the following components:    Glucose 164 (*)     BUN 24 (*)     CREATININE 1.3 (*)     Albumin 3.4 (*)     All other components within normal limits   TROPONIN - Abnormal; Notable for the following components:    Troponin, High Sensitivity 20 (*)     All other components within normal limits   BRAIN NATRIURETIC PEPTIDE - Abnormal; Notable for the following components:    Pro-BNP 1,052 (*)     All other components within normal limits   URINALYSIS WITH MICROSCOPIC - Abnormal; Notable for the following components:    Bilirubin Urine SMALL (*)     Ketones, Urine 15 (*)     Protein, UA 30 (*)     Leukocyte Esterase, Urine MODERATE (*)     WBC, UA 10-20 (*)     Bacteria, UA FEW (*)     All other components within normal limits   BETA-HYDROXYBUTYRATE - Abnormal; Notable for the following components:    Beta-Hydroxybutyrate 0.32 (*)     All other components within normal limits   BLOOD GAS, ARTERIAL - Abnormal; Notable for the following components:    pH, Blood Gas 7.499 (*)     PCO2 32.4 (*)     COHb 1.9 (*)     tHb (est) 8.5 (*)     All other components within normal limits   COVID-19, RAPID   CULTURE, URINE   CULTURE, BLOOD 2   CULTURE, BLOOD 1   MAGNESIUM   LACTIC ACID, PLASMA   ARTERIAL BLOOD GAS, RESPIRATORY ONLY   CBC WITH AUTO DIFFERENTIAL   IRON AND TIBC   FERRITIN   POCT GLUCOSE   POCT GLUCOSE       RADIOLOGY:  Interpreted personally and by Radiologist.  CT ABDOMEN PELVIS W IV CONTRAST Additional Contrast? None   Final Result   CTA of the chest is limited due to poor contrast.  Given this limitation,   there is no large central pulmonary embolism. The subsegmental and   peripheral vessels are poorly evaluated due to suboptimal contrast.      Minimal atelectasis in the lung bases. Significant inflammatory changes in the ascending colon and cecum with wall   thickening edema and inflammation in the right hemiabdomen and pelvis. Findings are concerning for typhlitis/colitis with  possible developing   pericolic abscess. There is inflammation extending to the terminal ileum. Appendix cannot be identified. .  Necrotic malignancy is less likely.    Consider colonoscopy when tablet 75 mcg (has no administration in time range)   Magnesium TABS 500 mg (has no administration in time range)   melatonin disintegrating tablet 5 mg (has no administration in time range)   memantine (NAMENDA) tablet 10 mg (has no administration in time range)   memantine (NAMENDA) tablet 5 mg (has no administration in time range)   sodium chloride flush 0.9 % injection 5-40 mL (has no administration in time range)   sodium chloride flush 0.9 % injection 5-40 mL (has no administration in time range)   0.9 % sodium chloride infusion (has no administration in time range)   ondansetron (ZOFRAN-ODT) disintegrating tablet 4 mg (has no administration in time range)     Or   ondansetron (ZOFRAN) injection 4 mg (has no administration in time range)   polyethylene glycol (GLYCOLAX) packet 17 g (has no administration in time range)   acetaminophen (TYLENOL) tablet 650 mg (has no administration in time range)     Or   acetaminophen (TYLENOL) suppository 650 mg (has no administration in time range)   glucose (GLUTOSE) 40 % oral gel 15 g (has no administration in time range)   dextrose 50 % IV solution (has no administration in time range)   glucagon (rDNA) injection 1 mg (has no administration in time range)   dextrose 5 % solution (has no administration in time range)   cefTRIAXone (ROCEPHIN) 1,000 mg in sterile water 10 mL IV syringe (1,000 mg Intravenous New Bag 21)   furosemide (LASIX) injection 20 mg (20 mg Intravenous Given 21)   iopamidol (ISOVUE-370) 76 % injection 90 mL (90 mLs Intravenous Given 21)     Counseling: The emergency provider has spoken with the patient and discussed todays results, in addition to providing specific details for the plan of care and counseling regarding the diagnosis and prognosis. Questions are answered at this time and they are agreeable with the plan. ED Course/Medical Decision Makin y.o. female here with abdominal pain and shortness of breath. Non-toxic appearing, afebrile, hemodynamically stable, and in no acute distress. New O2 requirement. CXR c/w mild CHF. Mild abdominal tenderness. Workup also notable for leukocytosis, UTI, and colitis w/ possible pericolic abscess. General surgery consulted. Started IV abx, IV lasix, and admitted in stable condition for further management.       --------------------------------- IMPRESSION AND DISPOSITION ---------------------------------    IMPRESSION  1. Acute respiratory failure with hypoxia ([facility] [address]    2. Acute encephalopathy        DISPOSITION  Disposition: Admit to telemetry  Patient condition is stable    NOTE: This report was transcribed using voice recognition software.  Every effort was made to ensure accuracy; however, inadvertent computerized transcription errors may be present    Magdiel Nash MD  Attending Emergency Physician         Magdiel Nash MD  07/06/21 9501

## 2021-07-05 NOTE — ED NOTES
Name: Krystal Palomo  : 1935  MRN: [id number]    Date: 2021    Benefits of immediately proceeding with Radiology exam outweigh the risks and therefore the following is being waived:      [] Pregnancy test    [] Protocol for Iodine allergy    [] MRI questionnaire    [x] BUN/Creatinine        MD Bijan Milian MD  21 [phone]

## 2021-07-06 PROBLEM — D64.9 ANEMIA: Status: ACTIVE | Noted: 2021-01-01

## 2021-07-06 PROBLEM — R19.7 DIARRHEA: Status: ACTIVE | Noted: 2021-01-01

## 2021-07-06 PROBLEM — R10.84 GENERALIZED ABDOMINAL PAIN: Status: ACTIVE | Noted: 2021-01-01

## 2021-07-06 PROBLEM — K52.9 COLITIS: Status: ACTIVE | Noted: 2021-01-01

## 2021-07-06 NOTE — PROGRESS NOTES
Physical Therapy    Physical Therapy Initial Assessment     Name: Tre Bunch  : 1935  MRN: [id number]      Date of Service: 2021    Evaluating PT: Genaro Fuller, PT, DPT [id number]      Room #:  4984/1456-R  Diagnosis:  Respiratory failure with hypoxia ([facility] [address] [J96.91]  PMHx/PSHx:  Thyroid disease, DM, left hip ORIF (20)  Precautions:  Fall risk, dementia, O2, alarm    SUBJECTIVE:    Pt lives with daughter in a 2 story house with 8 stair(s) and 1 rail(s) to enter. Bed is on the first floor and bath is on the first floor. Pt ambulated with [employer] with SBA prior to admission. OBJECTIVE:   Initial Evaluation  Date: 21 Treatment Date: Short Term/ Long Term   Goals   AM-PAC 6 Clicks 94/09     Was pt agreeable to Eval/treatment? Yes     Does pt have pain? No current complaints of pain     Bed Mobility  Rolling: NT  Supine to sit: Max A  Sit to supine: NT  Scooting: Max A to EOB  Rolling: Min A  Supine to sit: Min A  Sit to supine: Min A  Scooting: Min A   Transfers Sit to stand: Min A  Stand to sit: Min A  Stand pivot: Mod A with [employer]  Sit to stand: SBA  Stand to sit: SBA  Stand pivot: SBA with [employer]   Ambulation   3 feet with [employer] with Mod A  >50 feet with [employer] with SBA   Stair negotiation: ascended and descended NT  >4 step(s) with 1 rail(s) with Min A   ROM BUE: Refer to OT note  BLE: WFL     Strength BUE: Refer to OT note  BLE: NT     Balance Sitting EOB: SBA  Dynamic Standing: Mod A with [employer]  Sitting EOB: Supervision  Dynamic Standing: SBA with WW     Pt is A & O x: 3 grossly to person, place, and year. Pt was unable to recall month. Sensation: Pt denies numbness and tingling of extremities. Edema: Unremarkable. Patient education  Pt educated on PT role in acute care setting.     Patient response to education:   Pt verbalized understanding Pt demonstrated skill Pt requires further education in this area   Yes NA No     ASSESSMENT:    Conditions Requiring Skilled Therapeutic Intervention:    [x]Decreased strength     []Decreased ROM  [x]Decreased functional mobility  [x]Decreased balance   [x]Decreased endurance   [x]Decreased posture  []Decreased sensation  []Decreased coordination   []Decreased vision  []Decreased safety awareness   []Increased pain       Comments:    Pt was in bed with daughters present upon room entry, agreeable to PT evaluation. Pt was initially lethargic but this improved with activity. Pt required assistance for trunk mobility during supine to sit transfer. Pt initially had posterior lean but was able to correct once more alert. Pt completed sit to stand and stand pivot to chair. Steps were small and unsteady with no significant LOB. Pt is aware of proper hand placement during transfers. O2 sat ranged from 93-99% on 2 L O2/min with all activity; RN notified. Daughters verbalized understanding to call for assistance when pt wishes to return to bed. Pt was left seated with all needs met at conclusion of session. RN aware of pt's performance and position in chair. Treatment:  Patient practiced and was instructed in the following treatment:     Therapeutic activities:   o Bed mobility: Pt was cued for technique during supine to sit transfer. o Transfers: Pt was cued for hand placement during sit <> stand transfers. o Ambulation: Pt was cued for [employer] approximation and sequencing when pivoting to chair. Standing balance, endurance, and strength were challenged during pivot.  o Symptom and vital monitoring    Pt's/family goals:  1. To return to PLOF. Prognosis is Fair for reaching above PT goals. Patient and or family understand(s) diagnosis, prognosis, and plan of care. Yes.     PHYSICAL THERAPY PLAN OF CARE:    PT POC is established based on physician order and patient diagnosis     Referring provider/PT Order:    Start   Ordering Provider    07/06/21 0045  PT evaluation and treat Start: 07/06/21 0045, End: 07/06/21 0045, ONE TIME, Standing Count: 1

## 2021-07-06 NOTE — CARE COORDINATION
Patient from home, lives between two daughter's homes. Daughter, López Lee at bedside during discussion. Currently on oxygen, no home oxygen. She does use a walker at all times. PCP is Dr. Maria A Oliver. Family agreeable to homecare if ordered, no preference on provider. Also discussed private duty aides and provided list. Referral to hospitals homecare, they are running benefits to see if in network. ProBNP 1,052. Plan is home with family when released. hospitals is out of network. Referral to [facility]. For questions I can be reached at [phone]. Jeet Villavicencio, [place]    The Plan for Transition of Care is related to the following treatment goals: discharge planning when stable     The Patient and/or patient representative Mehul Guido was provided with a choice of provider and agrees   with the discharge plan. [x] Yes [] No    Freedom of choice list was provided with basic dialogue that supports the patient's individualized plan of care/goals, treatment preferences and shares the quality data associated with the providers.  [x] Yes [] No

## 2021-07-06 NOTE — PROGRESS NOTES
improve problem solving, judgement, memory, and attention for increased safety/participation in ADL/IADL tasks  * Therapeutic exercise to improve motor endurance, ROM, and functional strength for ADLs/functional transfers  * Therapeutic activities to facilitate/challenge dynamic balance, stand tolerance for increased safety and independence with ADLs  * Therapeutic activities to facilitate gross/fine motor skills for increased independence with ADLs  * Neuro-muscular re-education: facilitation of righting/equilibrium reactions, midline orientation, scapular stability/mobility, normalization of muscle tone, and facilitation of volitional active controled movement  * Positioning to improve skin integrity, interaction with environment and functional independence  * Delirium prevention/treatment    Pt was admitted d/t weakness, abdominal pain, dark stool, SOB, increased confusion    Recommended Adaptive Equipment: TBD as pt progresses       Home Living:  Pt lives with 1 dtr during the week and 1 dtr on the weekends. Bed/bath on the 1st floor at both [facility]. Pt does not use Basement. Bathroom setup:  Tub-Shower at both [facility], High Commode at [address] [place], low commode at [employer] owned:  [employer] and 1 dtr's house, [address]    Available Family Assist:  Family provides / assist - attends adult day-care 2 days/week    Prior Level of Function:  Pt receives assist w/ LB dressing, Bathing, PRN w/ UB dressing, toileting, Transfers and Mobility using [address] for ambulation.    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     Pain Level:  Unable to quantify - FACES Scale 5/10 Upper back pain after extended ax - kyphotic posture;  Relief w/ Rest and Repositioning, Nsg Notified   Additional Complaints:  None indicated    Cognition: A & O x 2 - generally oriented - able to report Full Name, , Current Month and Year w/ Min-Mod VCs   Able to Follow Multi-Step Commands w/ Min-Mod VCs   Memory:  fair (-)   Sequencing:  fair  (-) - [employer] \"[place]\" to assist her \"to turn herself around\" when transferring b/t surfaces or turning in small spaces (Bathroom)   Problem solving:  fair    Judgement/safety:  fair   Additional Comments:  Pt was pleasant and cooperative. Flat affect, fair eye contact, kyphotic posture    Vitals/Lab Values:  O2 sats > 92% on room air     Functional Assessment:  AM-PAC Daily Activity Raw Score: 14/24     Initial Eval Status  Date: 7/6/21   Treatment Status  Date: STGs = LTGs  Time frame: 10-14 days   Feeding NT    NPO    NA   Grooming Min A    Min A, mod VCs after set up seated in chair  Unable to tolerate ambulating to or standing at sink    SUP  Standing at the [employer]   UB Dressing Mod A    Simulated - seated EOB  Unable to tolerate item retrieval for activities    Min A     LB Dressing Max A    Max A to don socks, pants (simulated) seated/standing    Mod A     Bathing NT      Pt/Family ed re: Benefits of use of Tub Bench, sitting to transfer for decreased risk of fall    Mod A      Toileting Max A    Incontinent of Bowel ~ 2x, required Max A for bowel hygiene, clothing adjustment (pants simulated), Min-Mod A for safety w/ standing balance, tolerance    Mod A     Bed Mobility  Supine to sit:  Max A   Sit to supine:  NT     Unable to log-roll despite VCs, visual cues    Supine to sit: Min A  Sit to supine: Min A     Functional Transfers Mod A  Min A    Mod A to stand from EOB 1st and last transfers d/t fatigue, Min A from EOB, Abdiel 2x   Pt ed for safety/hand placement    SUP     Functional Mobility Min A Mod VCs, assist w/ Walker mgmt    Short distances at b/s, b/t surfaces 2x, along EOB  Pt ed for safety/improved safety awareness, walker safety    SUP     Balance Sitting:     Static:  SUP EOB    Dynamic:  Close SUP w/ functional ax EOB    Standing:     Static:  Min A w/ [employer]    Dynamic:  Min-Mod A w/ functional ax/mobility w/ [employer]       Activity Tolerance Fair    Limited by abdominal discomfort, upper back pain, general weakness/fatigue    Fair(+)   Visual/  Perceptual    Hearing: WNL   Glasses: No    WFL  Hearing Aids:  Yes - not present               Hand Dominance: Right   AROM Strength Additional Info:    RUE  WFL 4+/5 Good ;   Good FMC/dexterity noted during ADL tasks     LUE Shoulder flex to ~ 80*   Distally WFL 4/5 Good(-) ;    Good(-) FMC/dexterity noted during ADL tasks       Sensation:  Denies numbness or tingling Matthew UEs   Tone: WFL Matthew UEs   Edema: None Noted Matthew UEs     Comments: Upon arrival, patient was found in supine. She was agreeable to participate in therapeutic ax. Her Dtr was present during session. Received permission from RN prior to engaging pt in OT services. At the end of the session, patient was properly positioned in b/s chair. Call light and phone within reach, all lines and tubes intact. Oriented pt to call bell. Made all appropriate Environmental Modifications to facilitate pt's level of IND and safety. All needs met. Dtr remained at b/s. RN made aware of position in chair. Overall patient demonstrated decreased independence and safety during completion of ADL/functional transfer/mobility tasks. Pt would benefit from continued skilled OT to increase safety and independence with completion of ADL/IADL tasks for functional independence and quality of life.     Treatment: OT treatment provided this date includes:    Instruction/training on safety and adapted techniques for completion of ADLs, use of DME/AD/Adaptive equip:     Instruction/training on safe functional mobility/transfer techniques, use of DME/AD:     Instruction/training on energy conservation techs (EC)/Pursed-Lip Breathing (PLB)/work simplification for completion of ADLs:      Neuromuscular Reeducation to facilitate balance/righting reactions for increased function with ADLs:     Skilled positioning/alignment for Pain Mgmt, to maximize Pt's ability to [facility] interact w/ his/her environment, maximize

## 2021-07-06 NOTE — CONSULTS
GENERAL SURGERY  CONSULT NOTE  7/6/2021    Physician Consulted: Dr. Jennie Taylor  Reason for Consult: Abdominal Pain  Referring Physician: Dr. Watson PINEDO  Lg Fuentes is a 82 y.o. female with PMHx DM and dementia who presents for evaluation of abdominal pain. She is altered at baseline and is unable to provide much a history so most of it is obtained per chart review. She was brought in by EMS due to her daughter stating that her mother has been having more fatigue and weakness associated with an increase in confusion, more so than her baseline. She also reports her mother has been having some SOB and abdominal pain. She reports the abdominal pain started yesterday and is located mainly in the RLQ. She denies any nausea, vomiting, GERD, melena, hematochezia, fevers, or chills. Her last BM was yesterday. She is not on any anticoagulation and has not had any prior abdominal surgeries. It is unknown if she has ever had a colonoscopy or any family hx of UC, Crohn's, or colon cancer. She was found to have a UTI in the ED and was given Rocephin. She had a CT A/P which revealed stranding in the RLQ around the cecum and terminal ileum with a fluid collection. Past Medical History:   Diagnosis Date    Diabetes ([facility] [address]     Thyroid disease        Past Surgical History:   Procedure Laterality Date    HIP FRACTURE SURGERY Left 6/18/2020    HIP OPEN REDUCTION INTERNAL FIXATION performed by Adriana Parkinson MD at [facility] OR       Medications Prior to Admission:    Prior to Admission medications    Medication Sig Start Date End Date Taking?  Authorizing Provider   donepezil (ARICEPT) 5 MG tablet Take 5 mg by mouth nightly   Yes Historical Provider, MD   memantine (NAMENDA) 10 MG tablet Take 10 mg by mouth daily   Yes Historical Provider, MD   memantine (NAMENDA) 5 MG tablet Take 5 mg by mouth daily   Yes Historical Provider, MD   vitamin D (D3-1000) 25 MCG (1000 UT) CAPS Take 2 capsules by mouth daily   Yes Historical Provider, MD Extremities warm to touch, pink, with no edema. LABS:    CBC  Recent Labs     07/05/21  1524   WBC 13.9*   HGB 8.5*   HCT 27.1*        BMP  Recent Labs     07/05/21  1524      K 3.8   CL 99   CO2 27   BUN 24*   CREATININE 1.3*   CALCIUM 9.3     Liver Function  Recent Labs     07/05/21  1524   BILITOT 0.3   AST 16   ALT 9   ALKPHOS 82   PROT 6.5   LABALBU 3.4*     No results for input(s): LACTATE in the last 72 hours. No results for input(s): INR, PTT in the last 72 hours. Invalid input(s): PT    RADIOLOGY    CT ABDOMEN PELVIS W IV CONTRAST Additional Contrast? None    Result Date: 7/5/2021  EXAMINATION: CT OF THE ABDOMEN AND PELVIS WITH CONTRAST; CTA OF THE CHEST 7/5/2021 8:35 pm TECHNIQUE: CT of the abdomen and pelvis was performed with the administration of intravenous contrast. Multiplanar reformatted images are provided for review. Dose modulation, iterative reconstruction, and/or weight based adjustment of the mA/kV was utilized to reduce the radiation dose to as low as reasonably achievable.; CTA of the chest was performed after the administration of intravenous contrast.  Multiplanar reformatted images are provided for review. MIP images are provided for review. Dose modulation, iterative reconstruction, and/or weight based adjustment of the mA/kV was utilized to reduce the radiation dose to as low as reasonably achievable. COMPARISON: None. HISTORY: ORDERING SYSTEM PROVIDED HISTORY: diffuse abdominal ttp TECHNOLOGIST PROVIDED HISTORY: Additional Contrast?->None Reason for exam:->diffuse abdominal ttp Decision Support Exception - unselect if not a suspected or confirmed emergency medical condition->Emergency Medical Condition (MA) What reading provider will be dictating this exam?->CRC FINDINGS: CTA chest. The examination is limited due to poor contrast opacification of the pulmonary artery and the noninclusion of the lung apices.   Given this limitation, there are no large filling defects 7/5/2021  EXAMINATION: ONE XRAY VIEW OF THE CHEST PORTABLE UPRIGHT 7/5/2021 3:46 pm COMPARISON: 06/23/2021 HISTORY: ORDERING SYSTEM PROVIDED HISTORY: short of breath TECHNOLOGIST PROVIDED HISTORY: Reason for exam:->short of breath What reading provider will be dictating this exam?->CRC FINDINGS: Patient rotation to the right and low lung volumes. Borderline cardiomegaly. Vascular congestion with mild interstitial edema, worse on the right. No pulmonary consolidation or significant pleural effusion. Atherosclerotic, elongated thoracic aorta. Remote fracture of the left humeral neck. Mild CHF, new compared to previous. CTA PULMONARY W CONTRAST    Result Date: 7/5/2021  EXAMINATION: CT OF THE ABDOMEN AND PELVIS WITH CONTRAST; CTA OF THE CHEST 7/5/2021 8:35 pm TECHNIQUE: CT of the abdomen and pelvis was performed with the administration of intravenous contrast. Multiplanar reformatted images are provided for review. Dose modulation, iterative reconstruction, and/or weight based adjustment of the mA/kV was utilized to reduce the radiation dose to as low as reasonably achievable.; CTA of the chest was performed after the administration of intravenous contrast.  Multiplanar reformatted images are provided for review. MIP images are provided for review. Dose modulation, iterative reconstruction, and/or weight based adjustment of the mA/kV was utilized to reduce the radiation dose to as low as reasonably achievable. COMPARISON: None.  HISTORY: ORDERING SYSTEM PROVIDED HISTORY: diffuse abdominal ttp TECHNOLOGIST PROVIDED HISTORY: Additional Contrast?->None Reason for exam:->diffuse abdominal ttp Decision Support Exception - unselect if not a suspected or confirmed emergency medical condition->Emergency Medical Condition (MA) What reading provider will be dictating this exam?->CRC FINDINGS: CTA chest. The examination is limited due to poor contrast opacification of the pulmonary artery and the noninclusion of the lung apices. Given this limitation, there are no large filling defects in the main pulmonary artery and the central branches. The distal subsegmental and peripheral vessels cannot be evaluated due to poor contrast.  There is borderline cardiac size. There is minimal atelectasis in the lung bases. There is no focal consolidation or pleural effusion. Mild compression deformity of the upper thoracic spine is noted. CT abdomen and pelvis. The liver is of normal architecture. The gallbladder is absent. Spleen, and pancreas, are normal.  Bilateral adrenal nodules are noted, larger 1 on the right side measuring 3 x 1.7 cm. Kidneys are normal.  There is calcification and ectasia of abdominal aorta measuring 2.3 cm. Degenerative changes are identified in the lumbar spine. Pelvis. Bladder is partially distended with small amount of air in the bladder probably due to instrumentation. There is diverticulosis of the colon. There is significant inflammatory changes involving the cecum and ascending colon with large amount of inflammatory stranding densities in the right paracolic gutter. A fluid collection measuring 2.7 x 4.1 cm is identified adjacent to the ascending colon concerning for abscess. There is mild thickening and inflammation of the terminal ileum. Appendix cannot be identified. CTA of the chest is limited due to poor contrast.  Given this limitation, there is no large central pulmonary embolism. The subsegmental and peripheral vessels are poorly evaluated due to suboptimal contrast. Minimal atelectasis in the lung bases. Significant inflammatory changes in the ascending colon and cecum with wall thickening edema and inflammation in the right hemiabdomen and pelvis. Findings are concerning for typhlitis/colitis with  possible developing pericolic abscess. There is inflammation extending to the terminal ileum. Appendix cannot be identified. .  Necrotic malignancy is less likely.  Consider colonoscopy when feasible.        ASSESSMENT:  82 y.o. female with abdominal pain and stranding in the RLQ around the cecum and TI with fluid collection, possible Crohn's vs colitis vs diverticulitis vs colon mass    PLAN:  - Keep pt NPO, sips with meds  - Ensure adequate resuscitation with IVFs  - Pain control PRN  - Rocephin/Flagyl  - Fluid collection does not seem to be easily accessible when reviewing the CT scan  - Will need to discuss with family when her last colonoscopy was  - She will likely need a colonoscopy as an outpatient after the inflammation subsides  - Will discuss with Dr. Santa Coles    Electronically signed by Raza Redman MD on 7/6/21 at 2:08 AM EDT

## 2021-07-06 NOTE — H&P
[address]                  [address] [facility] [place],  [facility]                              HISTORY AND PHYSICAL    PATIENT NAME: Shelley Ibarra                  :        1935  MED REC NO:   [id number]                            ROOM:       80  ACCOUNT NO:   [de-identified]                           ADMIT DATE: 2021  PROVIDER:     Christella Siemens, MD    CHIEF COMPLAINT:  Shortness of breath, hypoxia, abdominal pain,  diarrhea, CT evidence of colitis. HISTORY OF PRESENT ILLNESS:  An 71-year-old with diabetes, hypertension  and significant dementia; who was brought to the emergency room by her  daughters due to increasing shortness of breath, abdominal pain and  diarrhea. Daughters also noted black stool, though the patient is on  iron. ER laboratory showed hemoglobin in the 8 range which is basically the  patient's baseline, however, CT of the abdomen showed extensive  inflammation in the cecum and ascending colon. CT of the chest was  negative for blood clots. RECENT PRESENT MEDICATION:  See med rec in the chart. PAST MEDICAL HISTORY:  Once again positive for hypertension, positive  for diabetes, positive for significant dementia. PAST SURGICAL HISTORY:  She had left hip replacement in . SOCIAL HISTORY:  She stopped smoking in . Does not drink. Does not  smoke. FAMILY HISTORY:  Noncontributory. REVIEW OF SYSTEMS:  SEASONAL ALLERGIES:  Otherwise negative. SKIN/LYMPHATICS:  Negative. CENTRAL NERVOUS SYSTEM:  According to daughter, she has been more  confused than her baseline. CIRCULATORY:  According to daughter is once again no chest pain but  shortness of breath with exertion. DIGESTIVE:  Positive for abdominal pain and diarrhea and dark stools. GENITOURINARY:  She has chronic UTIs, on suppressive therapy with  nitrofurantoin.   MUSCULOSKELETAL:  Negative, although daughter states she has not been  walking very well recently. PHYSICAL EXAMINATION:  GENERAL:  Sitting up in a chair, daughters are with her. She is in no  acute distress. VITAL SIGNS:  Temperature 98.3, pulse 78, respirations 16, blood  pressure 141/69, O2 sat was 98% on nasal cannula. SKIN:  Shows pallor but no jaundice. EYES:  Reveal no icterus. NECK:  Supple without bruits or masses. CHEST:  Clear to auscultation. HEART:  Regular rate and rhythm. ABDOMEN:  Soft, maybe some tenderness without rebound or guarding. EXTREMITIES:  No cyanosis, clubbing or edema. NEUROLOGIC:  She is awake, pleasant but confused, but there is no focal  neurological deficits at this time. LABORATORY:  White count 14.5, hemoglobin 8.2, platelet count 113,  ferritin 53, iron 19, iron sat 9, TIBC is low at 204. Urinalysis looks  positive, cultures pending. Once again CT of the chest shows no PE.  CT  of the abdomen shows significant inflammatory change in the ascending  colon and cecum with wall thickening edema and inflammation in the right  hemidiaphragm. Findings are concerning for typhlitis colitis with  possible developing pericolic abscess. There is inflammation extending  to the terminal ileum, appendix could not be identified, and chronic  malignancy is less likely. DIAGNOSTIC IMPRESSION:  Increasing shortness of breath with exertion  coupled with new findings of inflammatory bowel on the right side,  chronic anemia, hypoxia. Of note, her O2 sat on room air in the ER was  87%. PLAN:  The patient was admitted. Surgery has already seen the patient,  placed her on antibiotics. Also get GI evaluation for the CAT scan  findings to try to rule out whether this is infectious or possibly  inflammatory in etiology. Also obtain a 2-D echo to try to explain her  shortness of breath. Once again, she is anemic but this has been quite  chronic and not anything new. Discharge plan will be, home when stable.         Fidencio Pro MD    D: 07/06/2021 12:58:00       T: 07/06/2021 13:03:49     [id number]  Job#: [id number]     Doc#: [id number]    CC:

## 2021-07-07 NOTE — CONSULTS
Phuong and Miroslava Robin M.D. Patient Name: Krystal Palomo  YOB: 1935  PCP: Mona Mendez MD   Referring Provider:      Reason for Consultation:   Chief Complaint   Patient presents with    Shortness of Breath     pulse ox 87 at RA at home     Abdominal Pain     ab pain, taking Iron at home, dark stools         History of Present Illness:  82years old female with history of dementia came to the ER complaining of abdominal pain and distention. CT abdomen pelvis showed significant inflammatory changes in the ascending colon and cecum. No distant mets were seen in the chest or abdomen. CEA is 200. Hemoglobin is 8 with low iron saturation. Also found to have UTI and is being treated with ceftriaxone. Patient lives at home with her family. Needs help with her activities of daily living. Spends 70 to 80% of her waking hours on a wheelchair or bed. No recent weight loss or loss of appetite as per family. Patient had become increasingly weak over the last few weeks. Ex lap with hemicolectomy planned this Friday. Review of systems: Over 10 systems are reviewed and all were negative except as mentioned above. Diagnostic Data:     Past Medical History:   Diagnosis Date    Diabetes ([facility] [address]     Thyroid disease        Patient Active Problem List    Diagnosis Date Noted    Generalized abdominal pain 07/06/2021    Anemia 07/06/2021    Diarrhea 07/06/2021    Colitis 07/06/2021    Respiratory failure with hypoxia ([address] 07/05/2021    Postoperative urinary retention 06/21/2020    Drug-induced constipation 06/21/2020    Intertrochanteric fracture of left hip, closed, initial encounter ([facility] [address] 06/17/2020        Past Surgical History:   Procedure Laterality Date    HIP FRACTURE SURGERY Left 6/18/2020    HIP OPEN REDUCTION INTERNAL FIXATION performed by Valentín Gann MD at [address] [place] [address] History  History reviewed.  No pertinent family history. Social History  Social History     Socioeconomic History    Marital status:      Spouse name: Not on file    Number of children: Not on file    Years of education: Not on file    Highest education level: Not on file   Occupational History    Not on file   Tobacco Use    Smoking status: Former Smoker     Packs/day: 0.30     Years: 1.00     Pack years: 0.30     Start date: 1956     Quit date: 1957     Years since quittin.1    Smokeless tobacco: Never Used   Vaping Use    Vaping Use: Never used   Substance and Sexual Activity    Alcohol use: Yes     Comment: has an occasional drink on the holidays.  Drug use: No    Sexual activity: Never   Other Topics Concern    Not on file   Social History Narrative    Not on file     Social Determinants of Health     Financial Resource Strain:     Difficulty of Paying Living Expenses:    Food Insecurity:     Worried About Running Out of Food in the Last Year:     [address] in the Last Year:    Transportation Needs:     Lack of Transportation (Medical):  Lack of Transportation (Non-Medical):    Physical Activity:     Days of Exercise per Week:     Minutes of Exercise per Session:    Stress:     Feeling of Stress :    Social Connections:     Frequency of Communication with Friends and Family:     Frequency of Social Gatherings with Friends and Family:     Attends [employer] Services:     Active Member of Clubs or Organizations:     Attends Club or Organization Meetings:     Marital Status:    Intimate Partner Violence:     Fear of Current or Ex-Partner:     Emotionally Abused:     Physically Abused:     Sexually Abused:         TOBACCO:   reports that she quit smoking about 64 years ago. She started smoking about 65 years ago. She has a 0.30 pack-year smoking history. She has never used smokeless tobacco.  ETOH:   reports current alcohol use.     Home Medications  Prior to Admission medications    Medication Sig Start Date End Date Taking? Authorizing Provider   donepezil (ARICEPT) 5 MG tablet Take 5 mg by mouth nightly   Yes Historical Provider, MD   memantine (NAMENDA) 10 MG tablet Take 10 mg by mouth daily   Yes Historical Provider, MD   memantine (NAMENDA) 5 MG tablet Take 5 mg by mouth daily   Yes Historical Provider, MD   vitamin D (D3-1000) 25 MCG (1000 UT) CAPS Take 2 capsules by mouth daily   Yes Historical Provider, MD   melatonin 5 MG TABS tablet Take 5 mg by mouth nightly   Yes Historical Provider, MD   Multiple Vitamins-Minerals (ONE-A-DAY WOMENS 50 PLUS) TABS Take 1 tablet by mouth daily   Yes Historical Provider, MD   docusate sodium (COLACE, DULCOLAX) 100 MG CAPS Take 100 mg by mouth 2 times daily 6/22/20  Yes Melissa Banda MD   nitrofurantoin (MACRODANTIN) 25 MG capsule Take 25 mg by mouth daily    Yes Historical Provider, MD   MAGNESIUM PO Take 500 mg by mouth daily    Yes Historical Provider, MD   Probiotic Product (PROBIOTIC PO) Take 1 tablet by mouth daily   Yes Historical Provider, MD   donepezil (ARICEPT) 10 MG tablet Take 10 mg by mouth nightly   Yes Historical Provider, MD   levothyroxine (SYNTHROID) 75 MCG tablet Take 75 mcg by mouth Daily   Yes Historical Provider, MD   metFORMIN (GLUCOPHAGE) 1000 MG tablet Take 500 mg by mouth daily    Yes Historical Provider, MD   NONFORMULARY Ultranol (for bladder support). Take 1 tablet daily by mouth. Yes Historical Provider, MD       Allergies  Allergies   Allergen Reactions    Seasonal            Objective  BP (!) 146/55   Pulse 72   Temp 98.2 °F (36.8 °C) (Oral)   Resp 16   Ht 5' 5\" (1.651 m)   Wt 170 lb (77.1 kg)   SpO2 93%   BMI 28.29 kg/m²     Physical Exam:   Performance Status:  General: Alert awake not oriented to time or place. In no acute distress. Head and neck : PERRLA, EOMI . Sclera non icteric. Oropharynx : Clear  Neck: no JVD,  no adenopathy, no carotid bruit  LYMPHATICS : No peripheral lymphadenopathy.   Heart: Regular rate and regular rhythm, no murmur  Lungs: Clear to auscultation   Extremities: No edema,no cyanosis, no clubbing. Abdomen: Soft, non-tender;no masses, no organomegaly  Skin:  No rash. Neurologic:Cranial nerves grossly intact. No focal motor or sensory deficits . Recent Laboratory Data-   Lab Results   Component Value Date    WBC 14.5 (H) 07/06/2021    HGB 8.2 (L) 07/06/2021    HCT 27.1 (L) 07/06/2021    MCV 89.7 07/06/2021     07/06/2021    LYMPHOPCT 5.1 (L) 07/06/2021    RBC 3.02 (L) 07/06/2021    MCH 27.2 07/06/2021    MCHC 30.3 (L) 07/06/2021    RDW 16.0 (H) 07/06/2021    NEUTOPHILPCT 87.4 (H) 07/06/2021    MONOPCT 6.6 07/06/2021    BASOPCT 0.1 07/06/2021    NEUTROABS 12.72 (H) 07/06/2021    LYMPHSABS 0.74 (L) 07/06/2021    MONOSABS 0.96 (H) 07/06/2021    EOSABS 0.00 (L) 07/06/2021    BASOSABS 0.01 07/06/2021       Lab Results   Component Value Date     07/05/2021    K 3.8 07/05/2021    CL 99 07/05/2021    CO2 27 07/05/2021    BUN 24 (H) 07/05/2021    CREATININE 1.3 (H) 07/05/2021    GLUCOSE 164 (H) 07/05/2021    CALCIUM 9.3 07/05/2021    PROT 6.5 07/05/2021    LABALBU 3.4 (L) 07/05/2021    BILITOT 0.3 07/05/2021    ALKPHOS 82 07/05/2021    AST 16 07/05/2021    ALT 9 07/05/2021    LABGLOM 39 07/05/2021    GFRAA 47 07/05/2021       Lab Results   Component Value Date    IRON 19 (L) 07/06/2021    TIBC 204 (L) 07/06/2021    FERRITIN 53 07/06/2021           Radiology-    CT ABDOMEN PELVIS W IV CONTRAST Additional Contrast? None   Final Result   CTA of the chest is limited due to poor contrast.  Given this limitation,   there is no large central pulmonary embolism. The subsegmental and   peripheral vessels are poorly evaluated due to suboptimal contrast.      Minimal atelectasis in the lung bases. Significant inflammatory changes in the ascending colon and cecum with wall   thickening edema and inflammation in the right hemiabdomen and pelvis.    Findings are concerning for typhlitis/colitis with possible developing   pericolic abscess. There is inflammation extending to the terminal ileum. Appendix cannot be identified. .  Necrotic malignancy is less likely. Consider colonoscopy when feasible. CTA PULMONARY W CONTRAST   Final Result   CTA of the chest is limited due to poor contrast.  Given this limitation,   there is no large central pulmonary embolism. The subsegmental and   peripheral vessels are poorly evaluated due to suboptimal contrast.      Minimal atelectasis in the lung bases. Significant inflammatory changes in the ascending colon and cecum with wall   thickening edema and inflammation in the right hemiabdomen and pelvis. Findings are concerning for typhlitis/colitis with  possible developing   pericolic abscess. There is inflammation extending to the terminal ileum. Appendix cannot be identified. .  Necrotic malignancy is less likely. Consider colonoscopy when feasible. XR CHEST PORTABLE   Final Result   Mild CHF, new compared to previous. ASSESSMENT/PLAN :  82years old female admitted with abdominal pain found to have significant inflammatory changes in the colon associated with significantly elevated CEA suggestive of possible colon cancer. Patient is scheduled for ex lap hemicolectomy on this Friday. No evidence of distant mets on scans. She has a poor performance status ECOG 3 along with baseline dementia. Probably would not be a candidate for adjuvant treatment. Patient and family do not want any chemotherapy as well which is reasonable. Hemoglobin of 8 with an iron saturation 9% it suggestive of iron deficiency anemia. She has been on oral iron supplementation for past few months. We will give her Ferrlecit 150 mg daily for total dose of 1 g. We will continue to follow. Thank you for the consult.     Electronically signed by Alan Dunne MD on 7/7/2021 at 3:46 PM

## 2021-07-07 NOTE — CONSULTS
[address]                  [address] [facility], [facility]                                  CONSULTATION    PATIENT NAME: Tona Morel                  :        1935  MED REC NO:   [id number]                            ROOM:       4508  ACCOUNT NO:   [de-identified]                           ADMIT DATE: 2021  PROVIDER:     Leigha Cespedes MD    CONSULT DATE:  2021    REASON FOR CONSULTATION:  Anemia with an inflammatory mass in the right  colon. HISTORY:  She is 82years of age and has a history of dementia and I was  no more successful in obtaining a history than others before me. She was  brought to the emergency room by family on the afternoon of 2021. The issue was shortness of breath and abdominal pain. She has  apparently been witnessed to have generalized weakness and fatigue as  well as complained of abdominal pain and some worsening levels of  confusion. On admission, she was afebrile, but moderately anemic with a hemoglobin of  8.5. Her white count was 13,900 whereas two weeks ago, her hemoglobin  was 9.2 and her white count 5.9. CAT scans were done of the chest  because of complaints of shortness of breath with no evidence of  pulmonary emboli. CAT scan of the abdomen revealed suspected inflammatory changes of the  cecum and ascending colon with a fluid collection adjacent to the  ascending colon raising questions of an abscess. Historically, it seems  she has never had a colonoscopy previously completed. CEA was drawn and  it is in excess of 200. PAST MEDICAL HISTORY:  As listed in the chart includes diabetes and  hypothyroidism. She has had a hip fracture surgery as recently as . Open reduction and internal fixation. MEDICATIONS AT HOME:  Aricept, Namenda, a multivitamin, a stool  softener, and nitrofurantoin as well as metformin and thyroid  replacement in the form of Synthroid.     ALLERGIES: There are no drug allergies. OBJECTIVE:  HEENT:  She is mildly pale. GENERAL:  She is alert, confused. NECK:  Veins are not distended. Lymph nodes are lacking. HEART:  Heart tones normal.  I do not hear murmur or gallop. ABDOMEN:  Tender, right lower quadrant more than on the left side. I do  not feel a mass or find evidence of organomegaly. ASSESSMENT:  She has an elevated white blood cell count, but no fever. There is not much history, but her anemia is significant and a recent  development. Iron studies do not define iron deficiency and she is not  typically microcytic. Just looking at the x-ray, I would probably  suggest this was an inflammatory or an ischemic process though the fluid  collection is more in line with an inflammatory process. However, the  CEA changes everything and makes underlying malignancy all the more  likely. There is no evidence of distant metastasis, but quite likely there  is  significant extension into nodes and pericolonic fat. PLAN:  Surgery is already involved and will be discussing the situation  with the family. Colonoscopy would be the next step if the family  wishes to go in that direction. How functional she is on a daily basis  is not clear.         Lisa Booker MD    D: 07/06/2021 21:43:42       T: 07/06/2021 21:49:41     [id number]  Job#: [id number]     Doc#: [id number]    CC:

## 2021-07-07 NOTE — PROGRESS NOTES
Patient laying in bed in no acute distress  Vital signs are stable  Abdomen she does have pain mainly in the RIGHT lower quadrant which would be expected  Legs reveal no edema  Neurologically she is confused which is her baseline there is no focal deficits  Blood culture shows no growth so far  Urine culture shows over 100,000 gram-positive cocci  CEA level 205  Assessment: With the CEA level being that high more than likely has a colon malignancy. Question is what is the other findings of inflammation questionable infectious versus inflammatory.   Plan: IV fluids  Full liquids if okay with surgery  Await further opinion from general surgery and GI

## 2021-07-07 NOTE — PROGRESS NOTES
General Surgery Progress Note:    CC: abd pain    S: pt awake, daughters at bedside, pt with moderate abd pain, no n/v fevers chills        Objective:  @BP (!) 150/69   Pulse 88   Temp 98.3 °F (36.8 °C) (Temporal)   Resp 18   Ht 5' 5\" (1.651 m)   Wt 170 lb (77.1 kg)   SpO2 93%   BMI 28.29 kg/m² @    Physical -     Gen: NAD  Resp: Breathing Comfortably, no resp distress exp wheeze,   CV: Reg Rate no extra heart sounds  Abd: soft moderate R sided tenderness   EXT nvi    Assessment/Plan: 83 yo with likely R sided colon CA given CT findings and elevated CEA. Long discussion with pt and daughters. We discussed a palliative surgical approach with resection of R colon/mass and likely giving her a colostomy given concerns by daughters that she is incontinent of stool. I think this is reasonable.         OK for fulls,  Cont abx   Plan for OR Friday        Marlen Trinidad MD FACS     5:04 PM

## 2021-07-08 NOTE — ANESTHESIA PRE PROCEDURE
Department of Anesthesiology  Preprocedure Note       Name:  Bryant Gomes   Age:  82 y.o.  :  1935                                          MRN:  [id number]         Date:  2021      Surgeon: Jordyn Davies):  Juan aHger MD    Procedure: Procedure(s):  EXPLORATORY LAPAROTOMY WITH BOWEL RESECTION WITH COLOSTOMY    Medications prior to admission:   Prior to Admission medications    Medication Sig Start Date End Date Taking? Authorizing Provider   donepezil (ARICEPT) 5 MG tablet Take 5 mg by mouth nightly   Yes Historical Provider, MD   memantine (NAMENDA) 10 MG tablet Take 10 mg by mouth daily   Yes Historical Provider, MD   memantine (NAMENDA) 5 MG tablet Take 5 mg by mouth daily   Yes Historical Provider, MD   vitamin D (D3-1000) 25 MCG (1000 UT) CAPS Take 2 capsules by mouth daily   Yes Historical Provider, MD   melatonin 5 MG TABS tablet Take 5 mg by mouth nightly   Yes Historical Provider, MD   Multiple Vitamins-Minerals (ONE-A-DAY WOMENS 50 PLUS) TABS Take 1 tablet by mouth daily   Yes Historical Provider, MD   docusate sodium (COLACE, DULCOLAX) 100 MG CAPS Take 100 mg by mouth 2 times daily 20  Yes Tonio Wong MD   nitrofurantoin (MACRODANTIN) 25 MG capsule Take 25 mg by mouth daily    Yes Historical Provider, MD   MAGNESIUM PO Take 500 mg by mouth daily    Yes Historical Provider, MD   Probiotic Product (PROBIOTIC PO) Take 1 tablet by mouth daily   Yes Historical Provider, MD   donepezil (ARICEPT) 10 MG tablet Take 10 mg by mouth nightly   Yes Historical Provider, MD   levothyroxine (SYNTHROID) 75 MCG tablet Take 75 mcg by mouth Daily   Yes Historical Provider, MD   metFORMIN (GLUCOPHAGE) 1000 MG tablet Take 500 mg by mouth daily    Yes Historical Provider, MD   NONFORMULARY Ultranol (for bladder support). Take 1 tablet daily by mouth.    Yes Historical Provider, MD       Current medications:    Current Facility-Administered Medications   Medication Dose Route Frequency Provider Last Rate Last Admin    0.9 % sodium chloride infusion   Intravenous Continuous Myrtle EstelleEFREN - CNS 50 mL/hr at 07/07/21 1342 New Bag at 07/07/21 1342    ferric gluconate (FERRLECIT) 125 mg in sodium chloride 0.9 % 100 mL IVPB  125 mg Intravenous Daily Pauline Giordano MD        docusate sodium (COLACE) capsule 100 mg  100 mg Oral BID Kareem Santana MD   100 mg at 07/08/21 1001    donepezil (ARICEPT) tablet 10 mg  10 mg Oral Nightly Kareem Santana MD   10 mg at 07/06/21 2107    donepezil (ARICEPT) tablet 5 mg  5 mg Oral Nightly Kareem Santana MD   5 mg at 07/06/21 2107    levothyroxine (SYNTHROID) tablet 75 mcg  75 mcg Oral Daily Kareem Santana MD   75 mcg at 07/08/21 1001    melatonin disintegrating tablet 5 mg  5 mg Oral Nightly Kareem Santana MD   5 mg at 07/07/21 2009    memantine (NAMENDA) tablet 10 mg  10 mg Oral Daily Kareem Santana MD   10 mg at 07/08/21 1001    memantine (NAMENDA) tablet 5 mg  5 mg Oral Daily Kareem Santana MD   5 mg at 07/08/21 1003    sodium chloride flush 0.9 % injection 5-40 mL  5-40 mL Intravenous 2 times per day Kareem Santana MD   5 mL at 07/07/21 2214    sodium chloride flush 0.9 % injection 5-40 mL  5-40 mL Intravenous PRN Kareem Santana MD   10 mL at 07/07/21 1108    0.9 % sodium chloride infusion  25 mL Intravenous PRANGELO Santana MD        ondansetron (ZOFRAN-ODT) disintegrating tablet 4 mg  4 mg Oral Q8H PRN Kareem Santana MD        Or    [facility]ron [facility] injection 4 mg  4 mg Intravenous Q6H PRANGELO Santana MD        polyethylene glycol [facility]) packet 17 g  17 g Oral Daily PRN Kareem Santana MD        acetaminophen (TYLENOL) tablet 650 mg  650 mg Oral Q6H PRN Kareem Santana MD        Or   Bernestine Castro acetaminophen (TYLENOL) suppository 650 mg  650 mg Rectal Q6H PRN Kareem Santana MD        glucose (GLUTOSE) 40 % oral gel 15 g  15 g Oral PRN Kareem Santana MD        dextrose 50 % IV solution  12.5 g Intravenous PRN Kareem Santana MD        glucagon (rDNA) injection 1 mg  1 mg Intramuscular PRN Kareem Santana MD  dextrose 5 % solution  100 mL/hr Intravenous PRN Marcy Villafana MD        metronidazole (FLAGYL) 500 mg in NaCl 100 mL IVPB premix  500 mg Intravenous Q8H Zackery Tesfaye  mL/hr at 21 1226 500 mg at 21 1226    cefTRIAXone (ROCEPHIN) 2,000 mg in sterile water 20 mL IV syringe  2,000 mg Intravenous Q24H Zackery Tesfaye MD   2,000 mg at 21    perflutren lipid microspheres (DEFINITY) injection 1.65 mg  1.5 mL Intravenous ONCE PRN Marcy Villafana MD           Allergies: Allergies   Allergen Reactions    Seasonal        Problem List:    Patient Active Problem List   Diagnosis Code    Intertrochanteric fracture of left hip, closed, initial encounter ([facility] S72.142A    Postoperative urinary retention N99.89, R33.8    Drug-induced constipation K59.03    Respiratory failure with hypoxia (HCC) J96.91    Generalized abdominal pain R10.84    Anemia D64.9    Diarrhea R19.7    Colitis K52.9       Past Medical History:        Diagnosis Date    Diabetes ([facility]     Thyroid disease        Past Surgical History:        Procedure Laterality Date    HIP FRACTURE SURGERY Left 2020    HIP OPEN REDUCTION INTERNAL FIXATION performed by Estelle Vegas MD at  [facility] History:    Social History     Tobacco Use    Smoking status: Former Smoker     Packs/day: 0.30     Years: 1.00     Pack years: 0.30     Start date: 1956     Quit date: 1957     Years since quittin.1    Smokeless tobacco: Never Used   Substance Use Topics    Alcohol use: Yes     Comment: has an occasional drink on the holidays.                                  Counseling given: Not Answered      Vital Signs (Current):   Vitals:    21 0750 21 1549 21 2330 21 0814   BP: (!) 146/55 (!) 150/69 (!) 150/72 (!) 147/77   Pulse: 72 88 80 74   Resp:    Temp: 36.8 °C (98.2 °F) 36.8 °C (98.3 °F) 36.6 °C (97.8 °F) 37 °C (98.6 °F)   TempSrc: Oral Temporal Oral Oral   SpO2: 93% 93% 99% 93%   Weight:       Height:                                                  BP Readings from Last 3 Encounters:   07/08/21 (!) 147/77   06/23/21 (!) 168/96   04/26/21 130/70       NPO Status:                                                                                 BMI:   Wt Readings from Last 3 Encounters:   07/05/21 170 lb (77.1 kg)   06/23/21 163 lb (73.9 kg)   04/26/21 163 lb 4.8 oz (74.1 kg)     Body mass index is 28.29 kg/m². CBC:   Lab Results   Component Value Date    WBC 14.5 07/06/2021    RBC 3.02 07/06/2021    HGB 8.2 07/06/2021    HCT 27.1 07/06/2021    MCV 89.7 07/06/2021    RDW 16.0 07/06/2021     07/06/2021       CMP:   Lab Results   Component Value Date     07/05/2021    K 3.8 07/05/2021    K 4.0 06/23/2021    CL 99 07/05/2021    CO2 27 07/05/2021    BUN 24 07/05/2021    CREATININE 1.3 07/05/2021    GFRAA 47 07/05/2021    LABGLOM 39 07/05/2021    GLUCOSE 164 07/05/2021    PROT 6.5 07/05/2021    CALCIUM 9.3 07/05/2021    BILITOT 0.3 07/05/2021    ALKPHOS 82 07/05/2021    AST 16 07/05/2021    ALT 9 07/05/2021       POC Tests: No results for input(s): POCGLU, POCNA, POCK, POCCL, POCBUN, POCHEMO, POCHCT in the last 72 hours.     Coags: No results found for: PROTIME, INR, APTT    HCG (If Applicable): No results found for: PREGTESTUR, PREGSERUM, HCG, HCGQUANT     ABGs: No results found for: PHART, PO2ART, ZAR4HKE, UIR3MOX, BEART, A1BXRDVB     Type & Screen (If Applicable):  No results found for: LABABO, LABRH    Drug/Infectious Status (If Applicable):  No results found for: HIV, HEPCAB    COVID-19 Screening (If Applicable):   Lab Results   Component Value Date    COVID19 Not Detected 07/05/2021       CXR 07/05/2021  Narrative   EXAMINATION:   ONE XRAY VIEW OF THE CHEST PORTABLE UPRIGHT       7/5/2021 3:46 pm       COMPARISON:   06/23/2021       HISTORY:   ORDERING SYSTEM PROVIDED HISTORY: short of breath   TECHNOLOGIST PROVIDED HISTORY:   Reason for exam:->short of breath   What reading provider will be dictating this exam?->CRC       FINDINGS:   Patient rotation to the right and low lung volumes.  Borderline cardiomegaly. Vascular congestion with mild interstitial edema, worse on the right.  No   pulmonary consolidation or significant pleural effusion.  Atherosclerotic,   elongated thoracic aorta.  Remote fracture of the left humeral neck.           Impression   Mild CHF, new compared to previous.         12-LEAD EKG 07/05/2021  EKG 12 Lead  Order: [id number]  Status:  Final result   Visible to patient:  Yes (MyChart) Next appt:  10/25/2021 at 02:00 PM in [address] Earnestine Anderson MD)  0 Result Notes   Ref Range & Units 7/5/21 1511   Ventricular Rate BPM 84    Atrial Rate BPM 84    P-R Interval ms 174    QRS Duration ms 94    Q-T Interval ms 380    QTc Calculation (Bazett) ms 449    P Axis degrees 36    R Axis degrees -16    T Axis degrees 57    Resulting Agency  [facility]      Narrative & Impression    Normal sinus rhythm  Possible Left atrial enlargement  Borderline ECG  No previous ECGs available  Confirmed by Jessica Clayton ([id number]) on 7/6/2021 6:52:09 PM      Specimen Collected: 07/05/21 15:11 Last Resulted: 07/06/21 18:52                       Anesthesia Evaluation  Patient summary reviewed and Nursing notes reviewed no history of anesthetic complications (caregiver concerned about dementia; asked to forgo preop benzos):    Airway: Mallampati: I  TM distance: <3 FB   Neck ROM: full  Mouth opening: < 3 FB Dental:          Pulmonary:Negative Pulmonary ROS and normal exam  breath sounds clear to auscultation      (-) not a current smoker                           Cardiovascular:Negative CV ROS    (+) CHF:,         Rhythm: regular  Rate: normal                    Neuro/Psych:   Negative Neuro/Psych ROS              GI/Hepatic/Renal: Neg GI/Hepatic/Renal ROS            Endo/Other: Negative Endo/Other ROS   (+) DiabetesType II DM, well controlled, , hypothyroidism::., .                 Abdominal:       Abdomen: soft. Vascular: negative vascular ROS. Other Findings:             Anesthesia Plan      general     ASA 3     (#20 LEFT FA)  Induction: intravenous. MIPS: Postoperative opioids intended, Prophylactic antiemetics administered and Postoperative trial extubation. Anesthetic plan and risks discussed with patient, healthcare power of , child/children and legal guardian. Use of blood products discussed with patient, legal guardian, healthcare power of  and child/children whom consented to blood products. Plan discussed with CRNA and attending. Jameson Hercules RN, [facility]   7/8/2021    Pt seen, examined, chart reviewed, plan discussed.   Pawel Ybarra MD  7/9/2021  9:42 AM

## 2021-07-08 NOTE — PROGRESS NOTES
Renan Jones M.D. Patient Name: Horacio Williamson  YOB: 1935  PCP: Gracia Glass MD   Referring Provider:      Reason for Consultation:   Chief Complaint   Patient presents with    Shortness of Breath     pulse ox 87 at RA at home     Abdominal Pain     ab pain, taking Iron at home, dark stools         Subjective:  No acute issues overnight. Denies pain. For OR tomorrow    History of Present Illness:  82years old female with history of dementia came to the ER complaining of abdominal pain and distention. CT abdomen pelvis showed significant inflammatory changes in the ascending colon and cecum. No distant mets were seen in the chest or abdomen. CEA is 200. Hemoglobin is 8 with low iron saturation. Also found to have UTI and is being treated with ceftriaxone. Patient lives at home with her family. Needs help with her activities of daily living. Spends 70 to 80% of her waking hours on a wheelchair or bed. No recent weight loss or loss of appetite as per family. Patient had become increasingly weak over the last few weeks. Ex lap with hemicolectomy planned this Friday. Review of systems: Over 10 systems are reviewed and all were negative except as mentioned above.     Diagnostic Data:     Past Medical History:   Diagnosis Date    Diabetes ([facility]     Thyroid disease        Patient Active Problem List    Diagnosis Date Noted    Generalized abdominal pain 07/06/2021    Anemia 07/06/2021    Diarrhea 07/06/2021    Colitis 07/06/2021    Respiratory failure with hypoxia ([facility] 07/05/2021    Postoperative urinary retention 06/21/2020    Drug-induced constipation 06/21/2020    Intertrochanteric fracture of left hip, closed, initial encounter ([facility] 06/17/2020        Past Surgical History:   Procedure Laterality Date    HIP FRACTURE SURGERY Left 6/18/2020    HIP OPEN REDUCTION INTERNAL FIXATION performed by Madelaine Abreu MD at [facility]       Family History  History reviewed. No pertinent family history. Social History  Social History     Socioeconomic History    Marital status:      Spouse name: Not on file    Number of children: Not on file    Years of education: Not on file    Highest education level: Not on file   Occupational History    Not on file   Tobacco Use    Smoking status: Former Smoker     Packs/day: 0.30     Years: 1.00     Pack years: 0.30     Start date: 1956     Quit date: 1957     Years since quittin.1    Smokeless tobacco: Never Used   Vaping Use    Vaping Use: Never used   Substance and Sexual Activity    Alcohol use: Yes     Comment: has an occasional drink on the holidays.  Drug use: No    Sexual activity: Never   Other Topics Concern    Not on file   Social History Narrative    Not on file     Social Determinants of Health     Financial Resource Strain:     Difficulty of Paying Living Expenses:    Food Insecurity:     Worried About Running Out of Food in the Last Year:     [address] in the Last Year:    Transportation Needs:     Lack of Transportation (Medical):  Lack of Transportation (Non-Medical):    Physical Activity:     Days of Exercise per Week:     Minutes of Exercise per Session:    Stress:     Feeling of Stress :    Social Connections:     Frequency of Communication with Friends and Family:     Frequency of Social Gatherings with Friends and Family:     Attends Nondenominational Services:     Active Member of Clubs or Organizations:     Attends Club or Organization Meetings:     Marital Status:    Intimate Partner Violence:     Fear of Current or Ex-Partner:     Emotionally Abused:     Physically Abused:     Sexually Abused:         TOBACCO:   reports that she quit smoking about 64 years ago. She started smoking about 65 years ago. She has a 0.30 pack-year smoking history.  She has never used smokeless tobacco.  ETOH:   reports current alcohol use.    Home Medications  Prior to Admission medications    Medication Sig Start Date End Date Taking? Authorizing Provider   donepezil (ARICEPT) 5 MG tablet Take 5 mg by mouth nightly   Yes Historical Provider, MD   memantine (NAMENDA) 10 MG tablet Take 10 mg by mouth daily   Yes Historical Provider, MD   memantine (NAMENDA) 5 MG tablet Take 5 mg by mouth daily   Yes Historical Provider, MD   vitamin D (D3-1000) 25 MCG (1000 UT) CAPS Take 2 capsules by mouth daily   Yes Historical Provider, MD   melatonin 5 MG TABS tablet Take 5 mg by mouth nightly   Yes Historical Provider, MD   Multiple Vitamins-Minerals (ONE-A-DAY WOMENS 50 PLUS) TABS Take 1 tablet by mouth daily   Yes Historical Provider, MD   docusate sodium (COLACE, DULCOLAX) 100 MG CAPS Take 100 mg by mouth 2 times daily 6/22/20  Yes Gracialeonardo Glass, MD   nitrofurantoin (MACRODANTIN) 25 MG capsule Take 25 mg by mouth daily    Yes Historical Provider, MD   MAGNESIUM PO Take 500 mg by mouth daily    Yes Historical Provider, MD   Probiotic Product (PROBIOTIC PO) Take 1 tablet by mouth daily   Yes Historical Provider, MD   donepezil (ARICEPT) 10 MG tablet Take 10 mg by mouth nightly   Yes Historical Provider, MD   levothyroxine (SYNTHROID) 75 MCG tablet Take 75 mcg by mouth Daily   Yes Historical Provider, MD   metFORMIN (GLUCOPHAGE) 1000 MG tablet Take 500 mg by mouth daily    Yes Historical Provider, MD   NONFORMULARY Ultranol (for bladder support). Take 1 tablet daily by mouth. Yes Historical Provider, MD       Allergies  Allergies   Allergen Reactions    Seasonal            Objective  BP (!) 145/92   Pulse 82   Temp 98.4 °F (36.9 °C) (Oral)   Resp 18   Ht 5' 5\" (1.651 m)   Wt 170 lb (77.1 kg)   SpO2 96%   BMI 28.29 kg/m²     Physical Exam:   Performance Status:  General: Alert awake not oriented to time or place. In no acute distress. Head and neck : PERRLA, EOMI . Sclera non icteric.   Oropharynx : Clear  Neck: no JVD,  no adenopathy, no carotid bruit  LYMPHATICS : No peripheral lymphadenopathy. Heart: Regular rate and regular rhythm, no murmur  Lungs: Clear to auscultation   Extremities: No edema,no cyanosis, no clubbing. Abdomen: Soft, non-tender;no masses, no organomegaly  Skin:  No rash. Neurologic:Cranial nerves grossly intact. No focal motor or sensory deficits . Recent Laboratory Data-   Lab Results   Component Value Date    WBC 6.3 07/08/2021    HGB 7.8 (L) 07/08/2021    HCT 26.1 (L) 07/08/2021    MCV 88.8 07/08/2021     07/08/2021    LYMPHOPCT 11.3 (L) 07/08/2021    RBC 2.94 (L) 07/08/2021    MCH 26.5 07/08/2021    MCHC 29.9 (L) 07/08/2021    RDW 15.9 (H) 07/08/2021    NEUTOPHILPCT 79.4 07/08/2021    MONOPCT 7.3 07/08/2021    BASOPCT 0.2 07/08/2021    NEUTROABS 4.99 07/08/2021    LYMPHSABS 0.71 (L) 07/08/2021    MONOSABS 0.46 07/08/2021    EOSABS 0.08 07/08/2021    BASOSABS 0.01 07/08/2021       Lab Results   Component Value Date     07/08/2021    K 3.4 (L) 07/08/2021     07/08/2021    CO2 29 07/08/2021    BUN 19 07/08/2021    CREATININE 1.0 07/08/2021    GLUCOSE 96 07/08/2021    CALCIUM 8.4 (L) 07/08/2021    PROT 6.5 07/05/2021    LABALBU 3.4 (L) 07/05/2021    BILITOT 0.3 07/05/2021    ALKPHOS 82 07/05/2021    AST 16 07/05/2021    ALT 9 07/05/2021    LABGLOM 53 07/08/2021    GFRAA >60 07/08/2021       Lab Results   Component Value Date    IRON 19 (L) 07/06/2021    TIBC 204 (L) 07/06/2021    FERRITIN 53 07/06/2021           Radiology-    CT ABDOMEN PELVIS W IV CONTRAST Additional Contrast? None   Final Result   CTA of the chest is limited due to poor contrast.  Given this limitation,   there is no large central pulmonary embolism. The subsegmental and   peripheral vessels are poorly evaluated due to suboptimal contrast.      Minimal atelectasis in the lung bases. Significant inflammatory changes in the ascending colon and cecum with wall   thickening edema and inflammation in the right hemiabdomen and pelvis. Findings are concerning for typhlitis/colitis with  possible developing   pericolic abscess. There is inflammation extending to the terminal ileum. Appendix cannot be identified. .  Necrotic malignancy is less likely. Consider colonoscopy when feasible. CTA PULMONARY W CONTRAST   Final Result   CTA of the chest is limited due to poor contrast.  Given this limitation,   there is no large central pulmonary embolism. The subsegmental and   peripheral vessels are poorly evaluated due to suboptimal contrast.      Minimal atelectasis in the lung bases. Significant inflammatory changes in the ascending colon and cecum with wall   thickening edema and inflammation in the right hemiabdomen and pelvis. Findings are concerning for typhlitis/colitis with  possible developing   pericolic abscess. There is inflammation extending to the terminal ileum. Appendix cannot be identified. .  Necrotic malignancy is less likely. Consider colonoscopy when feasible. XR CHEST PORTABLE   Final Result   Mild CHF, new compared to previous. ASSESSMENT/PLAN :  82years old female admitted with abdominal pain found to have significant inflammatory changes in the colon associated with significantly elevated CEA suggestive of possible colon cancer. Patient is scheduled for ex lap hemicolectomy on this Friday. No evidence of distant mets on scans. She has a poor performance status ECOG 3 along with baseline dementia. Probably would not be a candidate for adjuvant treatment. Patient and family do not want any chemotherapy as well which is reasonable. Hemoglobin of 8 with an iron saturation 9% it suggestive of iron deficiency anemia. She has been on oral iron supplementation for past few months. We will give her Ferrlecit 150 mg daily for total dose of 1 g. We will continue to follow. 7/8/21  - OR tomorrow for R rosanna   - No adj chemo due to family wishes and ECOG  - Hgb 7.8.  Continue IV Ferrlecit. Likely will get some benefit.  Also likely to have a degree of AOCD given inflammation in colon    Electronically signed by Tre Shaikh MD on 7/8/2021 at 4:57 PM

## 2021-07-08 NOTE — PROGRESS NOTES
Subjective: The patient is awake and alert. No problems overnight. Denies chest pain, angina, and dyspnea. Denies abdominal pain. Tolerating diet. No nausea or vomiting. Objective:    BP (!) 150/72   Pulse 80   Temp 97.8 °F (36.6 °C) (Oral)   Resp 18   Ht 5' 5\" (1.651 m)   Wt 170 lb (77.1 kg)   SpO2 99%   BMI 28.29 kg/m²     Heart:  RRR, no murmurs, gallops, or rubs.   Lungs:  CTA bilaterally, no wheeze, rales or rhonchi  Abd: bowel sounds present, tender RLQ, nondistended, no masses  Extrem:  No clubbing, cyanosis, or edema    CBC with Differential:    Lab Results   Component Value Date    WBC 14.5 07/06/2021    RBC 3.02 07/06/2021    HGB 8.2 07/06/2021    HCT 27.1 07/06/2021     07/06/2021    MCV 89.7 07/06/2021    MCH 27.2 07/06/2021    MCHC 30.3 07/06/2021    RDW 16.0 07/06/2021    LYMPHOPCT 5.1 07/06/2021    MONOPCT 6.6 07/06/2021    BASOPCT 0.1 07/06/2021    MONOSABS 0.96 07/06/2021    LYMPHSABS 0.74 07/06/2021    EOSABS 0.00 07/06/2021    BASOSABS 0.01 07/06/2021     CMP:    Lab Results   Component Value Date     07/05/2021    K 3.8 07/05/2021    K 4.0 06/23/2021    CL 99 07/05/2021    CO2 27 07/05/2021    BUN 24 07/05/2021    CREATININE 1.3 07/05/2021    GFRAA 47 07/05/2021    LABGLOM 39 07/05/2021    GLUCOSE 164 07/05/2021    PROT 6.5 07/05/2021    LABALBU 3.4 07/05/2021    CALCIUM 9.3 07/05/2021    BILITOT 0.3 07/05/2021    ALKPHOS 82 07/05/2021    AST 16 07/05/2021    ALT 9 07/05/2021        Assessment:    Patient Active Problem List   Diagnosis    Intertrochanteric fracture of left hip, closed, initial encounter ([facility]    Postoperative urinary retention    Drug-induced constipation    Respiratory failure with hypoxia (HCC)    Generalized abdominal pain    Anemia    Diarrhea    Colitis       Plan:  Surgery Friday        Mac Schulz MD  8:11 AM  7/8/2021

## 2021-07-08 NOTE — PROGRESS NOTES
General Surgery Progress Note:    CC: abd pain    S: pt awake, d/w daughter at bedside. Objective:  @BP (!) 147/77   Pulse 74   Temp 98.6 °F (37 °C) (Oral)   Resp 16   Ht 5' 5\" (1.651 m)   Wt 170 lb (77.1 kg)   SpO2 93%   BMI 28.29 kg/m² @    Physical -     Gen: NAD  Resp: Breathing Comfortably, no resp distress exp wheeze,   CV: Reg Rate no extra heart sounds  Abd: soft moderate R sided tenderness   EXT nvi    Assessment/Plan: 83 yo with likely R sided colon CA given CT findings and elevated CEA. OR tomorrow for resection R colon and and descending diverting ostomy due to fecal incontinence.     Check coags, type screen, cbc bmp today   NPO pmn       Dilia Agudelo MD FACS     3:30 PM

## 2021-07-08 NOTE — CARE COORDINATION
Patient for bowel resection in am. Plan is for home with daughters and 24 hour care when released. [facility] homecare following, will need orders prior to discharge. For GMF today. For questions I can be reached at [phone].  Eleonora De La Garza [place]

## 2021-07-09 NOTE — ANESTHESIA POSTPROCEDURE EVALUATION
Department of Anesthesiology  Postprocedure Note    Patient: Hue Amaya  MRN: [id number]  YOB: 1935  Date of evaluation: 7/9/2021  Time:  9:43 AM     Procedure Summary     Date: 07/09/21 Room / Location: [facility] OR 10 / [facility]    Anesthesia Start: 4283 Anesthesia Stop:     Procedure: EXPLORATORY LAPAROTOMY WITH BOWEL RESECTION WITH COLOSTOMY (N/A ) Diagnosis: (/)    Surgeons: Breana Vasques MD Responsible Provider: Natacha Mora MD    Anesthesia Type: general ASA Status: 3          Anesthesia Type: No value filed. Isai Phase I:      Isai Phase II:      Last vitals: Reviewed and per EMR flowsheets.        Anesthesia Post Evaluation    Patient location during evaluation: PACU  Patient participation: complete - patient participated  Level of consciousness: awake  Pain score: 3  Airway patency: patent  Nausea & Vomiting: no nausea and no vomiting  Complications: no  Cardiovascular status: blood pressure returned to baseline  Respiratory status: acceptable  Hydration status: euvolemic

## 2021-07-09 NOTE — PROGRESS NOTES
ET nurse: Order for stoma marking for patient: Explained reason for visit and role of ET nurse to patient and daughters. After patients abdomen assessed while lying flat and sitting upright in bed, patient unable to sit on the edge of bed for assessment, abdomen marked on the left abdomen on a flat surface. Will follow up with patient.     Consent for: exploratory laparotomy, colectomy, possible ostomy with Dr. Satnam Peterson, RN

## 2021-07-09 NOTE — BRIEF OP NOTE
Brief Postoperative Note      Patient: Marisol Patel  YOB: 1935  MRN: [id number]    Date of Procedure: 7/9/2021    Pre-Op Diagnosis: Colon Cancer    Post-Op Diagnosis: Same       Procedure(s):  EXPLORATORY LAPAROTOMY, RIGHT TYLER COLECTOMY, LOOP COLOSTOMY & TRANSVERSUS ABDOMONIS BLOCK.     Surgeon(s):  Alphonso Dutton MD    Assistant:  Resident: Esmer Simon MD    Anesthesia: General    Estimated Blood Loss (mL): 75 mL    Complications: Serosal heat injury in ileum s/p repair    Specimens:   ID Type Source Tests Collected by Time Destination   A : TERMINAL ILEUM  AND RIGHT COLON  Tissue Duodenum SURGICAL PATHOLOGY Alphonso Dutton MD 7/9/2021 1027        Implants:  * No implants in log *      Drains:   Colostomy LUQ (Active)       Urethral Catheter Double-lumen 16 fr (Active)       Findings: Right colonic mass    Electronically signed by Esmer Simon MD on 7/9/2021 at 12:14 PM

## 2021-07-09 NOTE — OP NOTE
anesthesia record. Immediately prior to the procedure a time-out was called and the surgical checklist was reviewed and agreed upon by all present. The patient was prepped and draped in the usual sterile fashion using ChloraPrep. The midline was marked and incision was made with a 10 blade scalpel through the skin down to the subcutaneous tissue. Using electrocautery this was dissected down to the fascia which was incised and the peritoneal cavity was entered the fascia was then opened inferiorly and superiorly. A self-retaining Mogadore retractor was placed the liver was palpated and no clear obvious signs of metastatic disease were noted. The remaining colon was palpated down into the pelvis and no other signs of obvious metastatic disease were noted. The ligament of Treitz was identified and the small bowel was run to the terminal ileum. Approximately 15 cm from the terminal ileum the mesentery was opened with electrocautery and at this time a piece of small bowel was noted to be directly behind and there is concern for a serosal acute injury which was repaired in a Lembert fashion with 3-0 Vicryl. The ileum was divided with WILTON 75 blue load. Once this was done the mesentery was taken down with 2 Kae clamps and suture ligated down to the ileocolic pedicle. This was identified clamped ligated sharply with scissors then suture ligated in usual fashion. We then turned our attention to the omentum and this was divided with the LigaSure device up to the mid transverse colon. A window was made in the gastrocolic ligament and the mesentery was palpated and the middle colic artery was identified and mesenteric window was made just to the patient's right of this. The colon was then divided with a WILTON-75 blue load. The gastrocolic ligament was then taken down with the LigaSure device to the patient's right up to the hepatic flexure.   The white line of Toldt was then taken down with combination of electrocautery and LigaSure device. The colon was continuously rolled medially until completely free from the superior lateral attachments. The duodenum was identified and uninjured. There was noted to be neovascularization and at one point there appeared to be a large vein that was avulsed off of the mesentery. This was grasped and oversewn with 3-0 Vicryl and hemostasis was obtained. Colonic mesentery was then divided with the LigaSure device and the specimen was handed off to the back table for pathological review. The end of the ileum was brought up to the transverse colon a colotomy and enterotomy were made on the antimesenteric border and utilizing a WILTON 75 the anastomosis was created. The common channel was then closed utilizing a second load of the WILTON 75. A crotch stitch was placed with a 3-0 Vicryl. The mesenteric defect was closed with 3-0 Vicryl in a running fashion. And the omentum was placed over the staple lines and secured loosely with 3-0 Vicryl. The area of previous bleeding was irrigated and a large piece of snow was placed in the right upper quadrant. The remainder of the colon was then again palpated no colonic masses were noted and at the rectosigmoid junction there appeared to be enough mesentery to bring up the loop colostomy in the left lower quadrant. The skin was grasped with an Allis grasper and the was removed to create the colostomy site. The fat was then removed with electrocautery down to the fascia. The anterior fascia was divided with electrocautery the rectus muscle was then retracted in the posterior sheath was then divided with electrocautery making sure to pass 2 fingers through the anterior and posterior fascial defects. Gene Broaden was placed through the colostomy site and sigmoid colon was brought up through the left lower quadrant.   This was oriented with functional and superior blind in ear inferior this was noted to be without tension and in the correct orientation. Hemostasis was achieved within the abdomen and the abdominal fascia was then closed with #1 strata fix in a running fashion from inferior and superior fashion. The skin was closed with staples. We then turned our attention to the colostomy creation, a window was made within the colonic mesentery and a stoma bar was placed through. This was secured to the skin with 3-0 silk. The colon was opened with electrocautery and then secured to the skin with 3-0 Vicryl in a Florinda fashion. A colostomy appliance was placed over the newly created colostomy and midline Wound was covered with a dressing. The drapes were removed and the lateral flanks were prepped with DuraPrep. With sterile ultrasound the transversus abdominis was identified and a spinal needle was then placed into the right TAP plane and 30 cc of 0.25% ropivacaine was placed under direct visualization with the ultrasound. The left transversus abdominis plane was identified and a spinal needle was then placed into the TAP plane and 30 cc of 0.25% ropivacaine was placed under direct visualization with ultrasound. Needle, sponge, and instrument counts were reported as correct x2. Dr. Arabella Cook was present and scrubbed throughout the case. The patient tolerated the procedure well without complications. She was transferred to the recovery area in good condition.     Electronically signed by Joann Jara MD on 7/9/21 at 12:27 PM EDT        Electronically signed by Joann Jara MD on 7/9/2021 at 12:26 PM

## 2021-07-09 NOTE — CARE COORDINATION
[facility] following, they will not have staffing to see new ostomy care until Monday. New colostomy placed today. For questions I can be reached at [phone].  Laine Saucedo

## 2021-07-10 NOTE — PROGRESS NOTES
Patient's son is at the bedside and is asking for the patient to be ambulated today, however he doesn't know how she transfers/ambulates at home and her HGB is 6.6, this RN explained the risks of falls being increased when her HGB is low, PT/OT ordered to see the patient initially. Will proceed with their recommendations.

## 2021-07-10 NOTE — PROGRESS NOTES
General Surgery Progress Note:    CC: abd pain     S: pt awake, does not seem to be in much pain. Objective:  @BP (!) 161/82   Pulse 69   Temp 98.3 °F (36.8 °C) (Oral)   Resp 17   Ht 5' 5\" (1.651 m)   Wt 170 lb (77.1 kg)   SpO2 98%   BMI 28.29 kg/m² @    Physical -     Gen: NAD  Resp: Breathing Comfortably, no resp distress exp wheeze,   CV: Reg Rate no extra heart sounds  Abd: soft, incision CDI, ostomy viable,   EXT nvi    Assessment/Plan: 83 yo s/p R rosanna and diverting loop colostomy for colon mass.      Ok sips clears, etc  Gentle bolus 500 cc,  Transfuse PRBC, for HH this am   Ambulate as able   Pain control BPH     Roxanne Russell MD FACS     10:12 AM

## 2021-07-10 NOTE — PROGRESS NOTES
Subjective: The patient is awake and alert. No problems overnight. Denies chest pain, angina, and dyspnea. Denies abdominal pain. Tolerating diet. No nausea or vomiting. Being transfused. Objective:    BP (!) 161/82   Pulse 69   Temp 98.3 °F (36.8 °C) (Oral)   Resp 17   Ht 5' 5\" (1.651 m)   Wt 170 lb (77.1 kg)   SpO2 98%   BMI 28.29 kg/m²     Heart:  RRR, no murmurs, gallops, or rubs.   Lungs:  CTA bilaterally, no wheeze, rales or rhonchi  Abd: bowel sounds present, nontender, nondistended, no masses  Extrem:  No clubbing, cyanosis, or edema    CBC with Differential:    Lab Results   Component Value Date    WBC 9.1 07/10/2021    RBC 2.54 07/10/2021    HGB 6.6 07/10/2021    HCT 22.6 07/10/2021     07/10/2021    MCV 89.0 07/10/2021    MCH 26.0 07/10/2021    MCHC 29.2 07/10/2021    RDW 16.0 07/10/2021    LYMPHOPCT 6.8 07/10/2021    MONOPCT 7.0 07/10/2021    BASOPCT 0.1 07/10/2021    MONOSABS 0.64 07/10/2021    LYMPHSABS 0.62 07/10/2021    EOSABS 0.00 07/10/2021    BASOSABS 0.01 07/10/2021     CMP:    Lab Results   Component Value Date     07/10/2021    K 4.5 07/10/2021     07/10/2021    CO2 22 07/10/2021    BUN 18 07/10/2021    CREATININE 1.1 07/10/2021    GFRAA 57 07/10/2021    LABGLOM 47 07/10/2021    GLUCOSE 126 07/10/2021    PROT 6.5 07/05/2021    LABALBU 3.4 07/05/2021    CALCIUM 8.3 07/10/2021    BILITOT 0.3 07/05/2021    ALKPHOS 82 07/05/2021    AST 16 07/05/2021    ALT 9 07/05/2021        Assessment:    Patient Active Problem List   Diagnosis    Intertrochanteric fracture of left hip, closed, initial encounter ([facility] [address]    Postoperative urinary retention    Drug-induced constipation    Respiratory failure with hypoxia (HCC)    Generalized abdominal pain    Anemia    Diarrhea    Colitis       Plan:  Continue current care  Pt/ot      Elias Cosby MD  11:03 AM  7/10/2021

## 2021-07-10 NOTE — PROGRESS NOTES
Voicemail left for patient;'s daughter/LIZETH Sen Branch regarding telephone consent for patient to receive blood today. Awaiting callback. Telephone consent obtained for blood transfusion today with patient's daughter/LIZETH Cormier.

## 2021-07-11 NOTE — PROGRESS NOTES
General Surgery Progress Note:    CC: abd pain     S: pt awake, does not seem to be in much pain. Objective:  @BP (!) 156/92   Pulse 80   Temp 98.4 °F (36.9 °C) (Oral)   Resp 16   Ht 5' 5\" (1.651 m)   Wt 170 lb (77.1 kg)   SpO2 91%   BMI 28.29 kg/m² @    Physical -     Gen: NAD  Resp: Breathing Comfortably, no resp distress exp wheeze,   CV: Reg Rate no extra heart sounds  Abd: soft, incision CDI, ostomy viable,   EXT nvi    Assessment/Plan: 83 yo s/p R rosanna and diverting loop colostomy for colon mass. Advance diet slowly   Acute blood loss anemia, monitor,   Ambulate as able   Pain control BPH     Will modify ostomy to help secure in place.      Yue Rivera MD FACS     1:21 PM

## 2021-07-11 NOTE — PROGRESS NOTES
Subjective: The patient is awake and alert. confused. No problems overnight. Denies chest pain, angina, and dyspnea. Denies abdominal pain. Tolerating diet. No nausea or vomiting. Objective:    BP (!) 156/92   Pulse 80   Temp 98.4 °F (36.9 °C) (Oral)   Resp 16   Ht 5' 5\" (1.651 m)   Wt 170 lb (77.1 kg)   SpO2 91%   BMI 28.29 kg/m²     Heart:  RRR, no murmurs, gallops, or rubs.   Lungs:  CTA bilaterally, no wheeze, rales or rhonchi  Abd: bowel sounds present, mildly tender, nondistended, no masses  Extrem:  No clubbing, cyanosis, or edema    CBC with Differential:    Lab Results   Component Value Date    WBC 9.1 07/10/2021    RBC 2.54 07/10/2021    HGB 8.5 07/10/2021    HCT 27.4 07/10/2021     07/10/2021    MCV 89.0 07/10/2021    MCH 26.0 07/10/2021    MCHC 29.2 07/10/2021    RDW 16.0 07/10/2021    LYMPHOPCT 6.8 07/10/2021    MONOPCT 7.0 07/10/2021    BASOPCT 0.1 07/10/2021    MONOSABS 0.64 07/10/2021    LYMPHSABS 0.62 07/10/2021    EOSABS 0.00 07/10/2021    BASOSABS 0.01 07/10/2021     CMP:    Lab Results   Component Value Date     07/10/2021    K 4.5 07/10/2021     07/10/2021    CO2 22 07/10/2021    BUN 18 07/10/2021    CREATININE 1.1 07/10/2021    GFRAA 57 07/10/2021    LABGLOM 47 07/10/2021    GLUCOSE 126 07/10/2021    PROT 6.5 07/05/2021    LABALBU 3.4 07/05/2021    CALCIUM 8.3 07/10/2021    BILITOT 0.3 07/05/2021    ALKPHOS 82 07/05/2021    AST 16 07/05/2021    ALT 9 07/05/2021        Assessment:    Patient Active Problem List   Diagnosis    Intertrochanteric fracture of left hip, closed, initial encounter ([facility] [address]    Postoperative urinary retention    Drug-induced constipation    Respiratory failure with hypoxia (HCC)    Generalized abdominal pain    Anemia    Diarrhea    Colitis       Plan:  Continue current postoperative care        Tarvis Domínguez MD  8:37 AM  7/11/2021

## 2021-07-11 NOTE — PROGRESS NOTES
Patient keeps pulling off ABD on midline incision. This nurse keeps reorienting and reinforcing dressing. Colostomy bag changed 3x this shift and still keeps leaking.  Wound care consulted for management

## 2021-07-12 NOTE — PROGRESS NOTES
Subjective: The patient is awake and alert. No problems overnight. Denies chest pain, angina, and dyspnea. Denies abdominal pain. Tolerating diet. No nausea or vomiting. Objective:    BP (!) 152/80   Pulse 72   Temp 98.1 °F (36.7 °C) (Oral)   Resp 18   Ht 5' 5\" (1.651 m)   Wt 170 lb (77.1 kg)   SpO2 92%   BMI 28.29 kg/m²     Heart:  RRR, no murmurs, gallops, or rubs.   Lungs:  CTA bilaterally, no wheeze, rales or rhonchi  Abd: bowel sounds present, mildly tender, nondistended, no masses  Extrem:  No clubbing, cyanosis, or edema    CBC with Differential:    Lab Results   Component Value Date    WBC 8.2 07/12/2021    RBC 3.45 07/12/2021    HGB 9.3 07/12/2021    HCT 30.2 07/12/2021     07/12/2021    MCV 87.5 07/12/2021    MCH 27.0 07/12/2021    MCHC 30.8 07/12/2021    RDW 17.1 07/12/2021    LYMPHOPCT 9.1 07/12/2021    MONOPCT 4.5 07/12/2021    BASOPCT 0.4 07/12/2021    MONOSABS 0.37 07/12/2021    LYMPHSABS 0.74 07/12/2021    EOSABS 0.20 07/12/2021    BASOSABS 0.03 07/12/2021     CMP:    Lab Results   Component Value Date     07/12/2021    K 3.6 07/12/2021     07/12/2021    CO2 25 07/12/2021    BUN 10 07/12/2021    CREATININE 0.8 07/12/2021    GFRAA >60 07/12/2021    LABGLOM >60 07/12/2021    GLUCOSE 83 07/12/2021    PROT 6.5 07/05/2021    LABALBU 3.4 07/05/2021    CALCIUM 8.3 07/12/2021    BILITOT 0.3 07/05/2021    ALKPHOS 82 07/05/2021    AST 16 07/05/2021    ALT 9 07/05/2021        Assessment:    Patient Active Problem List   Diagnosis    Intertrochanteric fracture of left hip, closed, initial encounter ([facility] [address]    Postoperative urinary retention    Drug-induced constipation    Respiratory failure with hypoxia (HCC)    Generalized abdominal pain    Anemia    Diarrhea    Colitis       Plan:  per surgery  Discharge planning        Luis Guerrier MD  12:54 PM  7/12/2021

## 2021-07-12 NOTE — PROGRESS NOTES
Comprehensive Nutrition Assessment    Type and Reason for Visit:  Initial (LOS)    Nutrition Recommendations/Plan: Recommend and start Glucerna supplement BID and Boost Pudding daily to help meet nutritional needs and d/t decreased po intake of meals. Nutrition Assessment:  Patient at nutritional risk d/t overall decreased po intake x 1 week since admission ; adm w/ abd pain and loose stools ; noted colitis ; s/p R hemicolectomy and diverting loop colostomy for colon mass ; noted elevated CEA ; hx of DM and dementia ; will start ONS    Malnutrition Assessment:  Malnutrition Status: At risk for malnutrition (Comment)    Context:  Acute Illness     Findings of the 6 clinical characteristics of malnutrition:  Energy Intake:  1 - 75% or less of estimated energy requirements for 7 or more days  Weight Loss:  No significant weight loss     Body Fat Loss:  Unable to assess     Muscle Mass Loss:  Unable to assess    Fluid Accumulation:  No significant fluid accumulation     Strength:  Not Performed    Estimated Daily Nutrient Needs:  Energy (kcal):  8316-3630 (REE 1229 x 1.2 SF); Weight Used for Energy Requirements:  Current     Protein (g):  75-85 (1.3-1.5g/kg IBW); Weight Used for Protein Requirements:  Ideal        Fluid (ml/day):  4024-7403; Method Used for Fluid Requirements:  1 ml/kcal      Nutrition Related Findings:  I&Os WNL, 1+ edema, active BS, colostomy LUQ, A&O x 1, abd pain and loose stools PTA, redness to buttocks      Wounds:  Surgical Incision (Incision x 1)       Current Nutrition Therapies:    ADULT DIET;  Regular; Low Fiber    Anthropometric Measures:  · Height: 5' 5\" (165.1 cm)  · Current Body Weight: 170 lb (77.1 kg) (7/10, stated)   · Admission Body Weight: 170 lb (77.1 kg) (7/5, no method)    · Usual Body Weight:  (EMR shows past weights of 163# actual on 4/26/21 and 164# actual on 10/27/20)     · Ideal Body Weight: 125 lbs; % Ideal Body Weight 136 %   · BMI: 28.3  · BMI Categories: Overweight (BMI 25.0-29. 9)       Nutrition Diagnosis:   · Inadequate oral intake related to altered GI function (s/p hemicolectomy) as evidenced by poor intake prior to admission, GI abnormality      Nutrition Interventions:   Food and/or Nutrient Delivery:  Continue Current Diet, Start Oral Nutrition Supplement  Nutrition Education/Counseling:  Education not indicated   Coordination of Nutrition Care:  Continue to monitor while inpatient    Goals:  Pt will consume ~75% of meals served       Nutrition Monitoring and Evaluation:   Behavioral-Environmental Outcomes:  Beliefs and Attitutes   Food/Nutrient Intake Outcomes:  Food and Nutrient Intake, Supplement Intake  Physical Signs/Symptoms Outcomes:  Biochemical Data, Chewing or Swallowing, GI Status, Fluid Status or Edema, Hemodynamic Status, Meal Time Behavior, Diarrhea, Nutrition Focused Physical Findings, Weight, Skin     Discharge Planning:     Too soon to determine     Electronically signed by Nilton Triana RD, LD on 7/12/21 at 9:58 AM EDT    Contact: [phone]

## 2021-07-12 NOTE — CARE COORDINATION
Patient is POD#3 EXPLORATORY LAPAROTOMY, RIGHT TYLER COLECTOMY, LOOP COLOSTOMY & TRANSVERSUS ABDOMONIS BLOCK for Colon Cancer. Plan is home with daughters, 24 hr caregivers and [facility] when medically ready. Home Health Care orders are in. Patient is currently on a Low Fiber diet. Await input and plan from General surgery today.  Walt Padilla RN CM

## 2021-07-12 NOTE — PLAN OF CARE
Problem: Falls - Risk of:  Goal: Will remain free from falls  Description: Will remain free from falls  Outcome: Met This Shift     Problem: Falls - Risk of:  Goal: Absence of physical injury  Description: Absence of physical injury  Outcome: Met This Shift     Problem: Falls - Risk of:  Goal: Absence of physical injury  Description: Absence of physical injury  Outcome: Met This Shift     Problem: Skin Integrity:  Goal: Will show no infection signs and symptoms  Description: Will show no infection signs and symptoms  Outcome: Met This Shift     Problem: Skin Integrity:  Goal: Absence of new skin breakdown  Description: Absence of new skin breakdown  Outcome: Met This Shift

## 2021-07-12 NOTE — PROGRESS NOTES
noted above    Patient education  Pt educated on abdominal precautions/log rolling technique with bed mobility, transfer technique with sit<>stand and stand pivot    Patient response to education:   Pt verbalized understanding Pt demonstrated skill Pt requires further education in this area   yes yes yes     ASSESSMENT:    Comments:  Pt resting semi-supine upon arrival, agreeable to PT session. Pt requires manual assist for rolling and supine>sit at B LEs and trunk. Pt reports mild dizziness with upright sitting that subsided after several minutes sitting EOB. Pt requires cues for safe hand placement with sit<>stand transfers and manual assist for lift/lower. Pt maintains slight forward flexed posture and posterior weight shift during all standing. Manual assistance required throughout standing for balance and walker progression to complete bed>chair transfer. Pt demonstrates very short, shuffled steps for bed<>chair transfers, distance limited by fatigue. Pt colostomy began leaking and RN notified; RN presented to room and assisted with ostomy care. She requested pt to return to supine to allow placement of new ostomy bag/dressing and remain flat to allow adhesives to set fully. Pt with all needs in reach and daughter in room very attentive to pt needs. PT educated pt's daughter in recommendation for use of FWW and w/c if plan remains to return home as pt unable to amb household distances safely.     Treatment:  Patient practiced and was instructed in the following treatment:     Bed mobility: verbal/tactile cues for sequencing, manual assist for rolling and B LE/trunk negotiation for supine<>sit   Transfer training: cues for hand placement, manual assist for lift/lower, cues for upright posture and sequencing for bed>chair   Gait training: cues for upright posture and walker management, manual assist for balance and walker progression for bed<>chair transfers    PLAN:    Patient is making continued progress towards established goals. Will continue with current POC.         PLAN:    Time in  1308  Time out  1333    Total Treatment Time  25    CPT codes:  [] Gait training 93748 0 minutes  [] Manual therapy 04301 0 minutes  [x] Therapeutic activities 86158 25 minutes  [] Therapeutic exercises 63350 0 minutes  [] Neuromuscular reeducation 00990 0 minutes      Bonnie Li PT, DPT  [id number]

## 2021-07-13 PROBLEM — K52.9 COLITIS: Status: RESOLVED | Noted: 2021-01-01 | Resolved: 2021-01-01

## 2021-07-13 PROBLEM — R19.7 DIARRHEA: Status: RESOLVED | Noted: 2021-01-01 | Resolved: 2021-01-01

## 2021-07-13 PROBLEM — R10.84 GENERALIZED ABDOMINAL PAIN: Status: RESOLVED | Noted: 2021-01-01 | Resolved: 2021-01-01

## 2021-07-13 PROBLEM — J96.91 RESPIRATORY FAILURE WITH HYPOXIA (HCC): Status: RESOLVED | Noted: 2021-01-01 | Resolved: 2021-01-01

## 2021-07-13 NOTE — FLOWSHEET NOTE
ET Nurse (follow up) Tamera Aguirrekiki  Admit Date: 7/5/2021  2:55 PM       Stoma assessment:       07/13/21 0957   Colostomy LUQ   Placement Date: 07/09/21   Pre-existing: No  Timeout: Patient;Procedure;Site/Side  Inserted by: DR. Margaret Siddiqi  Location: LUQ   Stomal Appliance 1 piece; Changed   Stoma  Assessment Red;Moist;Protrudes   Mucocutaneous Junction   (claudine)   Peristomal Assessment Red   Treatment Bag change;Site care;Stoma powder   Stool Appearance Watery   Stool Color Brown   Output (mL) 150 ml   Incision 07/09/21 Abdomen Upper   Date First Assessed/Time First Assessed: 07/09/21 0957   Present on Hospital Admission: No  Primary Wound Type: Surgical Type  Location: Abdomen  Wound Location Orientation: Upper   Dressing Status Intact   Dressing/Treatment ABD pad       Plan:  Pouch change. Teaching completed with patients daughter for pouch change and care. Patient family will be managing ostomy at home. Supplies and teaching material given to patients daughter for  home. Will continued to follow patient.     Angeal Lake RN 7/13/2021 9:58 AM

## 2021-07-13 NOTE — PLAN OF CARE
Problem: Falls - Risk of:  Goal: Will remain free from falls  Description: Will remain free from falls  7/13/2021 0943 by Davida Love RN  Outcome: Met This Shift  7/13/2021 0047 by Jo Ann Singh RN  Outcome: Met This Shift  Goal: Absence of physical injury  Description: Absence of physical injury  7/13/2021 0943 by Davida Love RN  Outcome: Met This Shift  7/13/2021 0047 by Jo Ann Singh RN  Outcome: Met This Shift     Problem: Skin Integrity:  Goal: Will show no infection signs and symptoms  Description: Will show no infection signs and symptoms  7/13/2021 0943 by Davida Love RN  Outcome: Met This Shift  7/13/2021 0047 by Jo Ann Singh RN  Outcome: Met This Shift  Goal: Absence of new skin breakdown  Description: Absence of new skin breakdown  7/13/2021 0943 by Davida Love RN  Outcome: Met This Shift  7/13/2021 0047 by Jo Ann Singh RN  Outcome: Met This Shift  Goal: Signs of wound healing will improve  Description: Signs of wound healing will improve  7/13/2021 0943 by Davida Love RN  Outcome: Met This Shift  7/13/2021 0047 by Jo Ann Singh RN  Outcome: Met This Shift  Goal: Risk for impaired skin integrity will decrease  Description: Risk for impaired skin integrity will decrease  7/13/2021 0943 by Davida Love RN  Outcome: Met This Shift  7/13/2021 0047 by Jo Ann Singh RN  Outcome: Met This Shift     Problem:  Bowel/Gastric:  Goal: Gastrointestinal status for postoperative course will improve  Description: Gastrointestinal status for postoperative course will improve  7/13/2021 0943 by Davida Love RN  Outcome: Met This Shift  7/13/2021 0047 by Jo Ann Singh RN  Outcome: Met This Shift     Problem: Coping:  Goal: Ability to cope will improve  Description: Ability to cope will improve  7/13/2021 0943 by Davida Love RN  Outcome: Met This Shift  7/13/2021 0047 by Jo Ann Singh RN  Outcome: Met This Shift     Problem: Fluid Volume:  Goal: Ability to achieve a balanced intake and output will improve  Description: Ability to achieve a balanced intake and output will improve  7/13/2021 0943 by Jim Gonzalez RN  Outcome: Met This Shift  7/13/2021 0047 by Elizabeth Zapata RN  Outcome: Met This Shift     Problem: Health Behavior:  Goal: Ability to manage health-related needs will improve  Description: Ability to manage health-related needs will improve  7/13/2021 0943 by Jim Gonzalez RN  Outcome: Met This Shift  7/13/2021 0047 by Elizabeth Zapata RN  Outcome: Met This Shift     Problem: Nutritional:  Goal: Ability to attain and maintain optimal nutritional status will improve  Description: Ability to attain and maintain optimal nutritional status will improve  7/13/2021 0943 by Jim Gonzalez RN  Outcome: Met This Shift  7/13/2021 0047 by Elizabeth Zapata RN  Outcome: Met This Shift     Problem: Physical Regulation:  Goal: Postoperative complications will be avoided or minimized  Description: Postoperative complications will be avoided or minimized  7/13/2021 0943 by Jim Gonzalez RN  Outcome: Met This Shift  7/13/2021 0047 by Elizabeth Zapata RN  Outcome: Met This Shift     Problem: Sensory:  Goal: Pain level will decrease  Description: Pain level will decrease  7/13/2021 0943 by Jim Gonzalez RN  Outcome: Met This Shift  7/13/2021 0047 by Elizabeth Zapata RN  Outcome: Met This Shift

## 2021-07-13 NOTE — PROGRESS NOTES
Occupational Therapy  OCCUPATIONAL THERAPY RE-EVALUATION    [facility] [address] [address] [address]      Date:2021                                                Patient Name: Oskar Linn  MRN: [id number]  : 1935  Room: [address] [place] [address]    Re-evaluating OT: Kia Pace OTR/L #[id number]   OT re-evaluation completed following abdominal surgery    Referring Provider: Bishop Ruth Ann MD  Specific Provider Orders/Date: OT eval and treat 7/10/21    Diagnosis: Respiratory failure with hypoxia ([facility] [address] [J96.91]   Pt admitted to hospital with SOB And abdominal pain. Surgery / Procedure: 21 s/p R rosanna and diverting loop colostomy for colon mass. Pertinent Medical History:  has a past medical history of Diabetes ([facility] [address] and Thyroid disease.         Precautions:  Fall Risk, abdominal precautions, colostomy, cognition, bed alarm, TAPS    Assessment of current deficits    [x] Functional mobility  [x]ADLs  [x] Strength               [x]Cognition    [x] Functional transfers   [x] IADLs         [x] Safety Awareness   [x]Endurance    [] Fine Coordination              [x] Balance      [] Vision/perception   []Sensation     []Gross Motor Coordination  [] ROM  [] Delirium                   [] Motor Control     OT PLAN OF CARE   OT POC based on physician orders, patient diagnosis and results of clinical assessment    Frequency/Duration 1-3 days/wk for 2 weeks PRN   Specific OT Treatment Interventions to include:   * Instruction/training on adapted ADL techniques and AE recommendations to increase functional independence within precautions       * Training on energy conservation strategies, correct breathing pattern and techniques to improve independence/tolerance for self-care routine  * Functional transfer/mobility training/DME recommendations for increased independence, safety, and fall prevention  * Patient/Family education to increase follow through with safety techniques and functional independence  * Recommendation of environmental modifications for increased safety with functional transfers/mobility and ADLs  * Cognitive retraining/development of therapeutic activities to improve problem solving, judgement, memory, and attention for increased safety/participation in ADL/IADL tasks  * Therapeutic exercise to improve motor endurance, ROM, and functional strength for ADLs/functional transfers  * Therapeutic activities to facilitate/challenge dynamic balance, stand tolerance for increased safety and independence with ADLs  * Neuro-muscular re-education: facilitation of righting/equilibrium reactions, midline orientation, scapular stability/mobility, normalization of muscle tone, and facilitation of volitional active controled movement    Recommended Adaptive Equipment:  TBD     Home Living:  Pt lives with 1 dtr during the week and 1 dtr on the weekends. Bed/bath on the 1st floor at both [facility]. Pt does not use Basement. Bathroom setup:  Tub-Shower at both [facility], High Commode at 1 house, low commode at [employer] owned:  [employer] and 1 dtr's house, [employer]     Available Family Assist:  Family provides 24/7 assist - attends adult day-care 2 days/week     Prior Level of Function:  Pt receives assist w/ LB dressing, Bathing, PRN w/ UB dressing, toileting, Transfers and Mobility using [employer] for ambulation. 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 Therapist educated on abdominal precautions and facilitated repositioning to address pain      Cognition: A&O: 1/4 (self only);  Follows 1 step directions with cues   Memory:  poor   Sequencing:  P+   Problem solving:  P+   Judgement/safety:  P     Functional Assessment:  AM-PAC Daily Activity Raw Score: 12/24   Initial Eval Status  Date: 7/13/21 Treatment Status  Date: STGs = LTGs  Time frame: 10-14 days   Feeding Set-up   Mod I   Grooming Minimal Assist   Supervision    UB Dressing Moderate Assist   Stand by Assist    LB Dressing Dependent   Moderate Assist    Bathing Maximal Assist  Moderate Assist    Toileting Dependent   Maximal Assist    Bed Mobility  Supine to sit: Maximal Assist   Sit to supine: Maximal Assist   Supine to sit: Minimal Assist   Sit to supine: Minimal Assist    Functional Transfers Moderate Assist   Minimal Assist    Functional Mobility Moderate Assist     Ambulated short distance with w/w; to/from chair x3; cuing on sequencing, w/w management, posture and safety   Minimal Assist    Balance Sitting:     Static:  SBA    Dynamic:min A  Standing: mod A     Activity Tolerance F-    Limited due to fatigue and pain  F+   Visual/  Perceptual Glasses: yes  wfl                  Hand Dominance right   Strength ROM Additional Info:    RUE  Grossly Wfl, formal MMT deferred due to abdominal precautions  wfl good  and wfl FMC/dexterity noted during ADL tasks     LUE Grossly Wfl, formal MMT deferred due to abdominal precautions  wfl good  and wfl FMC/dexterity noted during ADL tasks     Hearing: wfl  Sensation:wfl  Tone: wfl  Edema:none noted       Comments: Upon arrival patient supine in bed and agreeable to OT Session. Therapist educated pt on role of OT. At end of session, pt seated in chair (daughter present - nursing notified) with call light and phone within reach, all lines intact. Overall patient demonstrated decreased independence and safety during completion of ADL/functional transfer/mobility tasks. Pt would benefit from continued skilled OT to increase safety and independence with completion of ADL/IADL tasks for functional independence and quality of life.     Treatment: OT treatment provided this date includes: Facilitation of bed mobility, unsupported sitting balance at EOB (impacting ADLs; addressing posture, weight shifting, dynamic reaching), functional transfers (various surfaces: bed, chair; sit to stands / stand pivots), standing tolerance tasks (addressing posture, balance and activity tolerance while incorporating light functional reaching; impacting ADLs and functional activity) and short functional ambulation tasks with w/w (in preparation for ambulation to/ from bathroom; cuing on posture, w/w management and safety) - skilled cuing on sequencing, hand placement, posture, body mechanics, energy conservation techniques and safety. Therapist facilitated self-care retraining: UB/LB self-care tasks (gown, socks), toileting hygiene task and seated grooming tasks while educating pt on modified techniques, posture, safety and energy conservation techniques. Skilled monitoring of HR, O2 sats and pts response to treatment. Rehab Potential: Good for established goals     Patient / Family Goal: none stated      Patient and/or family were instructed on functional diagnosis, prognosis/goals and OT plan of care. Demonstrated P+ understanding. Time In: 730  Time Out: 810  Total Treatment Time: 25 minutes    Min Units   OT Eval Low 07054       OT Eval Medium 85920      OT Eval High 79936      OT Re-Eval 97168  x  1   Therapeutic Ex 11481       Therapeutic Activities 19840  14  1   ADL/Self Care 68380  11  1   Orthotic Management 63853       Manual 03235     Neuro Re-Ed 09195       Non-Billable Time          Evaluation Time additionally includes thorough review of current medical information, gathering information on past medical history/social history and prior level of function, interpretation of standardized testing/informal observation of tasks, assessment of data and development of plan of care and goals.           Calvin Parnell OTR/L #[id number]

## 2021-07-13 NOTE — CARE COORDINATION
Discharge order noted. Patient is POD#4 EXPLORATORY LAPAROTOMY, RIGHT TYLER COLECTOMY, LOOP COLOSTOMY & TRANSVERSUS ABDOMONIS BLOCK for Colon Cancer. I met with patient and her two daughters in room to discuss transition of care. The plan remains home with daughter [place], 24 hr caregivers and home health care. They no longer want [facility], they now want [facility]. Referral made to Betzaida at [facility]. Home health care orders are in. Transportation set up via [facility] for 4 pm today to the patient's home address. Charge nurse, RN, patient and family all notified. Ambulance form in envelope in soft chart. Ambulance form faxed to [facility].  Marco Antonio Serrano RN CM

## 2021-07-13 NOTE — PLAN OF CARE
Problem: Falls - Risk of:  Goal: Will remain free from falls  Description: Will remain free from falls  7/13/2021 0047 by oYusuf Cochran RN  Outcome: Met This Shift  7/12/2021 1733 by Marshall Adamson RN  Outcome: Met This Shift  Goal: Absence of physical injury  Description: Absence of physical injury  7/13/2021 0047 by Yousuf Cochran RN  Outcome: Met This Shift  7/12/2021 1733 by Marshall Adamson RN  Outcome: Met This Shift

## 2021-07-13 NOTE — PROGRESS NOTES
Subjective: The patient is awake and alert. No problems overnight. Denies chest pain, angina, and dyspnea. Denies abdominal pain. Tolerating diet. No nausea or vomiting. Objective:    BP (!) 162/89   Pulse 90   Temp 97.3 °F (36.3 °C) (Oral)   Resp 18   Ht 5' 5\" (1.651 m)   Wt 170 lb (77.1 kg)   SpO2 94%   BMI 28.29 kg/m²     Heart:  RRR, no murmurs, gallops, or rubs.   Lungs:  CTA bilaterally, no wheeze, rales or rhonchi  Abd: bowel sounds present, nontender, nondistended, no masses  Extrem:  No clubbing, cyanosis, or edema    CBC with Differential:    Lab Results   Component Value Date    WBC 8.2 07/12/2021    RBC 3.45 07/12/2021    HGB 9.3 07/12/2021    HCT 30.2 07/12/2021     07/12/2021    MCV 87.5 07/12/2021    MCH 27.0 07/12/2021    MCHC 30.8 07/12/2021    RDW 17.1 07/12/2021    LYMPHOPCT 9.1 07/12/2021    MONOPCT 4.5 07/12/2021    BASOPCT 0.4 07/12/2021    MONOSABS 0.37 07/12/2021    LYMPHSABS 0.74 07/12/2021    EOSABS 0.20 07/12/2021    BASOSABS 0.03 07/12/2021     CMP:    Lab Results   Component Value Date     07/12/2021    K 3.6 07/12/2021     07/12/2021    CO2 25 07/12/2021    BUN 10 07/12/2021    CREATININE 0.8 07/12/2021    GFRAA >60 07/12/2021    LABGLOM >60 07/12/2021    GLUCOSE 83 07/12/2021    PROT 6.5 07/05/2021    LABALBU 3.4 07/05/2021    CALCIUM 8.3 07/12/2021    BILITOT 0.3 07/05/2021    ALKPHOS 82 07/05/2021    AST 16 07/05/2021    ALT 9 07/05/2021        Assessment:    Patient Active Problem List   Diagnosis    Intertrochanteric fracture of left hip, closed, initial encounter ([facility] [address]    Postoperative urinary retention    Drug-induced constipation    Anemia       Plan:  Home if ok with surgery        Eleazar Botello MD  8:01 AM  7/13/2021

## 2021-07-14 NOTE — PROGRESS NOTES
Physician Progress Note      PATIENT:               Debby Vergara  CSN #:                  [id number]  :                       1935  ADMIT DATE:       2021 2:55 PM  [address] [place] DATE:        2021 5:45 PM  RESPONDING  PROVIDER #:        Matias Chang MD          QUERY TEXT:    Patient admitted with Colon CA. Noted documentation of acute respiratory   failure in progress notes. In order to support the diagnosis of acute   respiratory failure, please include additional clinical indicators in your   documentation. Or please document if the diagnosis of acute respiratory   failure has been ruled out after further study. The medical record reflects the following:  Risk Factors: Colon CA, Anemia, Respiratory failure, unspecified    Clinical Indicators: Abg's: pCO2 32.4, PO2 88.8, RR 12-19, No respiratory   distress noted, CXR- Mild CHF, Supplemental O2 2-3L, EMS noted SpO2 87% on   room air. SpO2 > 92% this admission. Treatment: CXR, Supplemental O2, SpO2 checks, labs, Abg's    Thank you,  Leigh Bravo RN, BSN    Acute Respiratory Failure Clinical Indicators per Cristobal's and   Quick Reference Guide:  pO2 < 60 mmHg or SpO2 (pulse oximetry) < 91% breathing room air  pCO2 > 50 and pH < 7.35  P/F ratio (pO2 / FIO2) < 300  pO2 decrease or pCO2 increase by 10 mmHg from baseline (if known)  Supplemental oxygen of 40% or more  Presence of respiratory distress, tachypnea, dyspnea, shortness of breath,   wheezing  Unable to speak in complete sentences  Use of accessory muscles to breathe  Extreme anxiety and feeling of impending doom  Tripod position  Confusion/altered mental status/obtunded  Options provided:  -- Acute Respiratory Failure as evidenced by, Please document evidence.   -- Acute Respiratory Failure ruled out after study  -- Other - I will add my own diagnosis  -- Disagree - Not applicable / Not valid  -- Disagree - Clinically unable to determine / Unknown  -- Refer to Clinical Documentation Reviewer    PROVIDER RESPONSE TEXT:    This patient is in acute respiratory failure as evidenced by hypoxia Hypoxia    Query created by: Jeremy Cooks on 7/12/2021 10:37 AM      Electronically signed by:  Chava Navarro MD 7/13/2021 9:37 PM

## 2021-07-15 NOTE — TELEPHONE ENCOUNTER
Received a call from the patient's daughter Demetri Caceres [phone] who is wanting to schedule the follow up appt. MA told her that Dr. Dominique Ruff is unavailable. Scheduled the patient with Dr. Tammy Rust on 7/26/21 at 2:30am in [facility]. Gave her the directions to the office. Bring ID, medication list and insurance card. She verbalized understanding. The daughter wants to know the pathology result. MA told her that it's still in process. The result should come back by the time of the appt. Forwarded message to Kennedy OCHOA for informational purposes.   Electronically signed by Sherine Haley on 7/15/2021 at 3:47 PM

## 2021-07-16 NOTE — TELEPHONE ENCOUNTER
Pt was discharged from hospital on 7/13/21. Was having some shortness of breath in the hospital and was on O2, they did not sent her home with any. Is having occasional SOB at home now. Daughter said O2 was 89% this morning. They are requesting orders for oxygen to have at home.     Please advise    Aniceto Robbins [phone]

## 2021-07-22 NOTE — ED PROVIDER NOTES
HPI:  7/22/21,   Time: 10:53 AM EDT       Aric De Jesus is a 82 y.o. female presenting to the ED for lue red and swollen, beginning 2 days ago. The complaint has been persistent, mild in severity, and worsened by nothing. Seen by  [facility], referrd to er. Recent surgery. No fever/chills/sweats/nv/d. Pt poor historian. Nothing makes better, no trauma, no hx same    Review of Systems:   Pertinent positives and negatives are stated within HPI, all other systems reviewed and are negative.          --------------------------------------------- PAST HISTORY ---------------------------------------------  Past Medical History:  has a past medical history of Diabetes ([facility] [address] and Thyroid disease. Past Surgical History:  has a past surgical history that includes Hip fracture surgery (Left, 6/18/2020) and colectomy (N/A, 7/9/2021). Social History:  reports that she quit smoking about 64 years ago. She started smoking about 65 years ago. She has a 0.30 pack-year smoking history. She has never used smokeless tobacco. She reports current alcohol use. She reports that she does not use drugs. Family History: family history is not on file. The patients home medications have been reviewed. Allergies: Seasonal        ---------------------------------------------------PHYSICAL EXAM--------------------------------------    Constitutional/General: Alert and oriented x3, well appearing, non toxic in NAD  Head: Normocephalic and atraumatic  Eyes: PERRL, EOMI, conjunctive normal, sclera non icteric  Mouth: Oropharynx clear, handling secretions,   Neck: Supple, full ROM,   Respiratory:  Not in respiratory distress  Cardiovascular:   2+ distal pulses     Musculoskeletal: Moves all extremities x 4. Warm and well perfused, no clubbing, cyanosis, or edema. Capillary refill <3 seconds, erythem left mid forearm with mild tenderness, no fluctuance, nvi distal  Integument: skin warm and dry. 3 cm area erythema mid forearm  Lymphatic: no lymphadenopathy noted  Neurologic: GCS 15, no focal deficits,   Psychiatric: Normal Affect    -------------------------------------------------- RESULTS -------------------------------------------------  I have personally reviewed all laboratory and imaging results for this patient. Results are listed below. LABS:  No results found for this visit on 07/22/21. RADIOLOGY:  Interpreted by Radiologist.  US DUP UPPER EXTREMITY LEFT VENOUS   Final Result   No evidence of DVT in the left upper extremity. .             EKG:  This EKG is signed and interpreted by the EP. Time:   Rate:   Rhythm:   Interpretation:   Comparison:       ------------------------- NURSING NOTES AND VITALS REVIEWED ---------------------------   The nursing notes within the ED encounter and vital signs as below have been reviewed by myself. BP (!) 151/65   Pulse 80   Temp 98.6 °F (37 °C)   Resp 16   Ht 5' 7\" (1.702 m)   Wt 170 lb (77.1 kg)   SpO2 98%   BMI 26.63 kg/m²   Oxygen Saturation Interpretation: Normal    The patients available past medical records and past encounters were reviewed. ------------------------------ ED COURSE/MEDICAL DECISION MAKING----------------------  Medications - No data to display      ED COURSE:       Medical Decision Making:    Us neg, cellulitis by exam, dc on abx      This patient's ED course included: a personal history and physicial examination    This patient has remained hemodynamically stable during their ED course. Re-Evaluations:             Re-evaluation. Patients symptoms show no change          Counseling: The emergency provider has spoken with the patient and discussed todays results, in addition to providing specific details for the plan of care and counseling regarding the diagnosis and prognosis.   Questions are answered at this time and they are agreeable with the plan.       --------------------------------- IMPRESSION AND DISPOSITION ---------------------------------    IMPRESSION  1. Cellulitis of left upper extremity        DISPOSITION  Disposition: Discharge to home  Patient condition is stable    NOTE: This report was transcribed using voice recognition software.  Every effort was made to ensure accuracy; however, inadvertent computerized transcription errors may be present        Jaylen Fernandes MD  07/22/21 8418

## 2021-07-22 NOTE — ED NOTES
Bed: 27  Expected date:   Expected time:   Means of arrival:   Comments:  David Bhandari LPN  14/99/13 1698

## 2021-07-22 NOTE — TELEPHONE ENCOUNTER
Jaavd Alcon called from [facility] re Jennifer Matos. She is out seeing her today and pt has left arm pain, warm to the touch, red and painful, edema. Spoke to Dakota , [facility] Charissa Reynolds and instructed pt should go to ER. Spoke to Javad Phillip and instructed pt to go to ER per Dakota. FYI.

## 2021-07-26 NOTE — PROGRESS NOTES
Pt here for follow-up from ex lap with right hemicolectomy and loop colostomy for colon cancer by Dr. Shahbaz Boucher on 7/9/21. Pt's family reports that they are having issues with the bag staying in place due to a fold on her abd wall. Reports she is eating okay. Having regular bowel movements. Denies fevers/chills.       Incision well healed; staples removed; ostomy pink/viable/functional    Pt tolerated well  Has appointment with Oncology 7/28/21    Return PRN    Viviana Acosta MD, FACS  7/26/2021  3:34 PM

## 2021-07-28 NOTE — TELEPHONE ENCOUNTER
Spoke with family regarding options ahead regarding chemotherapy and colon CA. Told them to meet with the Oncologist and see what side effects are expected   And of course a lot of the decision will be based on the results of the PET scan in the near future. PAtient would be a good candidate to go through the scan.

## 2021-08-11 NOTE — TELEPHONE ENCOUNTER
Received a call from the patient's daughter Dedrick Lopez [phone] who stated that her ostomy bag does not stay well. It leaks frequesntly and home care nurse has tried different types of bag but she still has trouble. The daughter wants to know if the patient can be a candidate for reversal.   The daughter stated she has an appt with oncologist Dr. Jesus Torre on next Wednesday for consultation of PET scan. MA told the daughter that we can facilitate the appt with ostomy nurse Teresa Ozuna at [facility]. Dedrick Lopez is agreeable to meet Teresa Ozuna. Pend the ostomy nurse consultation. Forwarded message to Dr. Debbie Jiang and will wait further advisement.   Electronically signed by Krysten Ogden on 8/11/2021 at 4:52 PM

## 2021-08-12 PROBLEM — C18.3 MALIGNANT NEOPLASM OF HEPATIC FLEXURE (HCC): Status: ACTIVE | Noted: 2021-01-01

## 2021-08-13 NOTE — TELEPHONE ENCOUNTER
Spoke with Frankey Railing ostomy nurse. Notified her that the patient need ostomy care. Frankey Railing stated she is going to call the patient's daughter Harvey Bentley and set up the appt.   Electronically signed by Danial Strange on 8/13/2021 at 9:54 AM

## 2021-08-19 NOTE — TELEPHONE ENCOUNTER
Received a call from the patient's daughter Ghulam Santana who stated that she would like to make an appt with Dr. Jack Stringer to discuss about the revision or reversal. Scheduled the patient on 9/8/21 at 1pm in [facility]. Bring ID, medication list and insurance card. Gave the directions to the office. The daughter verbalized understanding. Per daughter, Dr. Julio Duval [phone] is her oncologist and she is scheduled for PET scan on 9/1/21.    Electronically signed by Juan Daniel Rooney on 8/19/2021 at 4:24 PM

## 2021-08-19 NOTE — FLOWSHEET NOTE
Clinical Level of Care Assessment    Outpatient Ostomy Care      NAME:  Ancelmo Morales  YOB: 1935  MEDICAL RECORD NUMBER:  [id number]   DATE:  8/19/2021      Patient Prashant Willard Assessment- Document in Flowsheet I&O   Points   Review of chart []   0   Assess Complete Ostomy tab in Navigator for assessment of; stoma status, peristomal skin, presence of hernia/stool consistency/diet/related medications   Simple adjustments to pouch size/pouch system, new stoma pattern, accessory addition/deletion. []   1   Assess Complete Ostomy tab in Navigator for assessment of; stoma status, peristomal skin, presence of hernia/stool consistency/diet/related medications   Moderate adjustments to pouch size/pouch system, new stoma pattern, accessory addition/deletion. Observe patient/caregiver with hands-on care. 1-2 adjustments to pouch size/system/skin care/accessory addition or deletion. [x]   2   Assess Complete Ostomy tab in Navigator for assessment of; stoma status, peristomal skin, presence of hernia/stool consistency/diet/related medications   Complex adjustments to pouch size/pouch system, new stoma pattern, accessory addition/deletion. 3 or more complex adjustments to pouch size/system/skin care/accessory addition or deletion. Observe patient/caregiver with hands-on care. Assess patient/patient abdomen for optimal pre-marked stoma site. Assess patient abdomen for type of hernia belt/accessory needed. []   3         Ambulation Status Documented in [facility] Clinical Note  Status Definition Points   Independent Independently able to ambulate. Fully able (without any assistance) to get on/off exam table/chair. []   0   Minimal Physical Assistance Requires physical assistance of one person to ambulate and/or position patient to be examined. Includes necessary physical assistance to position lower extremities on/off stool.    []   1   Moderate Physical Assistance Requires at least one staff member to physically assist patient in ambulating into treatment room, and/or on off chair/bed. Requires assistance to bathroom. [x]   2   Full Assistance Requires assistance of at least two staff members to transfer patient into treatment room and/or on/off bed/chair. \"Total Transfer\". Unable to use bathroom requires bedside commode and/or bedpan []   3       Teaching Effort Documented in [facility] Clinical Note  Effort Definition Points   No Teaching  []   0   General Initial/Simple lesson:  Assess readiness to learn, assess patient learning style to determine educational flow/special needs for learning. Teaching related to 1-3 topics  Documentation in [facility] completed. []   1   Intermediate Assess readiness to learn, assess patient learning style to determine educational flow/special needs for learning. Teaching related to 3-4 topics. Hernia belt application and care considerations  Documentation in [facility] completed. [x]   2   Complex Assess readiness to learn, assess patient learning style to determine educational flow/special needs for learning. Teaching of greater than 5 additional topics   Pre-operative ostomy education with review of written resources for patient/family/caregiver as needed. Demonstration/return demonstration of ostomy irrigation  Documentation in [facility] completed. []   3     Patient Assessment and Planning in [facility] Clinical Note   Planning Definition Points   Simple Simple pouch change procedure completed and reviewed with patient/family/caregiver   Documentation in [facility] completed. []   1   Intermediate Moderate level of follow-up needs:   Pouch change/discharge procedure revised and reviewed with patient/caregiver. Communications with outside resources; i.e. Telephone calls to Surgeon/ PCP, family/caregiver, home health, ECF. Documentation in [facility] completed.      [x]   2   Complex Complex level of instructions/changes:   Family/Caregiver learning/demonstration/return demonstration visit. Pouching/discharge procedure revised/reviewed with patient/family/caregiver. Contact with outside resources; i.e. communication with Surgeon/ PCP, home health, ECF. Contact/referral to ostomy appliance supplier for new or additional products. Review when to call [facility] or schedule follow-up visit. Referral to Emergency Department   Documentation in [facility] completed. []   3       Is this the Patient's First Visit with [facility] @ [facility]? Yes    Is this Patient Established to this [facility] within the last 3 years? Yes             Clinical Level of Care      Points  0-3  Level 1 []     Points  4-6  Level 2 []     Points  7-8  Level 3 [x]     Points  9-10  Level 4 []     Points  11-12  Level 5 []        08/19/21 1250   Colostomy LUQ   Placement Date: 07/09/21   Pre-existing: No  Timeout: Patient;Procedure;Site/Side  Inserted by: DR. Colleen Williamson  Location: LUQ   Stomal Appliance 1 piece; Changed  (vanessa convex)   Stoma  Assessment Flush;Moist;Red   Peristomal Assessment   (red, raw directly around stoma)   Treatment Bag change  (skin care, barrier rings, barrier strips)   Stool Appearance Loose   Stool Color Brown   Stool Amount Smear   patient in wheel chair- pivot to bed  2 daughters present with patient  Stoma sits in a major divot when sitting straight up in bed  Folds at 3/9 filled with paste strips and then barrier ring placed around stoma  [place] convex pouch applies with barrier strips  Reviewed pouching technique   Patient has my contact information      Electronically signed by Joie Leos RN on 8/19/2021 at 2:20 PM

## 2021-10-01 NOTE — TELEPHONE ENCOUNTER
Per daughter, pt's dementia seems to be getting worse. States pt was recently diagnosed with terminal cancer. Not sure if the cancer is having an effect on cognition, or if something can be done to help with her memory. Please call daughter to discuss & advise.

## 2021-10-04 NOTE — TELEPHONE ENCOUNTER
Call from Dr. Nurys Lane office making referral for Palliative care. Called to daughter Samuel Rizo per their request. Discussed Palliative care service and Samuel Rizo states patient is staying in [facility] at another [facility] home and is seeing Oncology at [facility]. Samuel Rizo states her mother is going to start radiation tomorrow. Contact information provided to Samuel Rizo if she feels her mother needs Palliative care once she is back in town and may just go into Hospice care once radiation is complete.

## 2023-02-28 NOTE — TELEPHONE ENCOUNTER
Daughter notified that referral for hearing eval must come from pt's PCP. States understanding. Expiration Date (Optional): 11/30/23

## 2023-12-08 NOTE — DISCHARGE SUMMARY
Admit Date: 7/5/2021  2:55 PM   Discharge Date: 7/13/2021    Patient Active Problem List   Diagnosis    Intertrochanteric fracture of left hip, closed, initial encounter ([facility] [address]    Postoperative urinary retention    Drug-induced constipation    Anemia    Malignant neoplasm of hepatic flexure ([facility] [address]        Present on Admission:   (Resolved) Respiratory failure with hypoxia (HCC)   (Resolved) Generalized abdominal pain   Anemia   (Resolved) Diarrhea   (Resolved) Colitis   Malignant neoplasm of hepatic flexure ([facility] [address]        [facility]   Home Medication Instructions [id number]    Printed on:08/19/21 1410   Medication Information                      donepezil (ARICEPT) 10 MG tablet  Take 10 mg by mouth nightly             donepezil (ARICEPT) 5 MG tablet  Take 5 mg by mouth nightly             levothyroxine (SYNTHROID) 75 MCG tablet  Take 75 mcg by mouth Daily             melatonin 5 MG TABS tablet  Take 5 mg by mouth nightly             memantine (NAMENDA) 10 MG tablet  Take 10 mg by mouth daily             memantine (NAMENDA) 5 MG tablet  Take 5 mg by mouth daily             metFORMIN (GLUCOPHAGE) 1000 MG tablet  Take 500 mg by mouth daily              Multiple Vitamins-Minerals (ONE-A-DAY WOMENS 50 PLUS) TABS  Take 1 tablet by mouth daily             nitrofurantoin (MACRODANTIN) 25 MG capsule  Take 25 mg by mouth daily              NONFORMULARY  Ultranol (for bladder support). Take 1 tablet daily by mouth. Probiotic Product (PROBIOTIC PO)  Take 1 tablet by mouth daily             vitamin D (D3-1000) 25 MCG (1000 UT) CAPS  Take 2 capsules by mouth daily                  Hospital Course/Procedures:82year-old with significant dementia was admitted on 7/5/2021 with shortness of breath ,abdominal pain and diarrhea.hemoglobin was 8. CT of the chest was negative for PE.  CT of the abdomen showed extensive inflammation of the cecum and ascending colon.   Surgery was consulted as well as No care due was identified.  [facility] Embedded Care Due Messages. Reference number: [id number].   12/08/2023 5:41:07 PM CST   gastroenterology. CEA level was in excess of 200. Colonoscopy revealed colon cancer. Patient underwent laparoscopic RIGHT hemicolectomy with loop colostomy on 7/9/2021. Patient tolerated the procedure well. Hematology/oncology was consulted. Patient's family declined any chemotherapy. Patient remained stable and was discharged to home with her family in stable condition on 7/13/2021. Home health care was ordered. Consultants Following:gastroenterology, general surgery, hematology/oncology    Disposition:home with home health care    Follow-up:PCP, Gen. Surgery.       Gracia Glass MD  8/19/2021  2:10 PM

## (undated) DEVICE — SEALER ENDOSCP NANO COAT OPN DIV CRV L JAW LIGASURE IMPACT

## (undated) DEVICE — Device

## (undated) DEVICE — JELLY,LUBE,STERILE,FLIP TOP,TUBE,4-OZ: Brand: MEDLINE

## (undated) DEVICE — NEEDLE HYPO 25GA L1.5IN BLU POLYPR HUB S STL REG BVL STR

## (undated) DEVICE — TOWEL,OR,DSP,ST,GREEN,DLX,XR,4/PK,20PK/C: Brand: MEDLINE

## (undated) DEVICE — DRAPE PATIENT ISOL IRRIG POUCH

## (undated) DEVICE — TAPE ADH W3INXL10YD WHT COT WVN BK POWERFUL RUB BASE HIGHLY

## (undated) DEVICE — APPLICATOR MEDICATED 26 CC SOLUTION HI LT ORNG CHLORAPREP

## (undated) DEVICE — COVER HNDL LT DISP

## (undated) DEVICE — COVER DSG W7 7 8XL11IN WHT POR CLTH PRECUT WTRPRF MEDIPORE

## (undated) DEVICE — SET INSTRUMENT LAP II

## (undated) DEVICE — SET INSTRUMENT LAP I

## (undated) DEVICE — DRAPE,LITHOTOMY,AB,STERILE: Brand: MEDLINE

## (undated) DEVICE — SOLUTION IV IRRIG WATER 1000ML POUR BRL 2F7114

## (undated) DEVICE — PAD PERINL POST FOAM DISPOSABLE FOR AMSCO SURG TBL

## (undated) DEVICE — TOWEL,OR,DSP,ST,BLUE,STD,6/PK,12PK/CS: Brand: MEDLINE

## (undated) DEVICE — SPONGE LAP W18XL18IN WHT COT 4 PLY FLD STRUNG RADPQ DISP ST

## (undated) DEVICE — BIT DRL L500MM DIA6X9MM CANN STP L QUIK CPL FOR DH DC TFN

## (undated) DEVICE — TUBING, SUCTION, 9/32" X 10', STRAIGHT: Brand: MEDLINE

## (undated) DEVICE — YANKAUER,POOLE TIP,STERILE,50/CS: Brand: MEDLINE

## (undated) DEVICE — DILATOR INTESTINAL

## (undated) DEVICE — AGENT HEMSTAT W4XL4IN OXIDIZED REGENERATED CELOS STRUCTURED

## (undated) DEVICE — TAPE ADH CLTH SILK H2O REPELLENT CURAD

## (undated) DEVICE — SOLUTION IV IRRIG POUR BRL 0.9% SODIUM CHL 2F7124

## (undated) DEVICE — PATIENT RETURN ELECTRODE, SINGLE-USE, CONTACT QUALITY MONITORING, ADULT, WITH 9FT CORD, FOR PATIENTS WEIGING OVER 33LBS. (15KG): Brand: MEGADYNE

## (undated) DEVICE — BIT DRL L L266MM DIA16MM FEM FLX CANN QUIK CPL

## (undated) DEVICE — GOWN,BREATHABLE SLV,AURORA,XLG,STRL: Brand: MEDLINE

## (undated) DEVICE — PADDING,UNDERCAST,COTTON, 4"X4YD STERILE: Brand: MEDLINE

## (undated) DEVICE — GUIDEWIRE ORTH L400MM DIA3.2MM FOR TFN

## (undated) DEVICE — STAPLER INT L75MM CUT LN L73MM STPL LN L77MM BLU B FRM 8

## (undated) DEVICE — TUBING, SUCTION, 3/16" X 12', STRAIGHT: Brand: MEDLINE

## (undated) DEVICE — 3M™ IOBAN™ 2 ANTIMICROBIAL INCISE DRAPE 6650EZ: Brand: IOBAN™ 2

## (undated) DEVICE — SUTURE VCRL L48IN 2 0 VLT BRAID CART ABSRB CNPJ482C

## (undated) DEVICE — BASIC SINGLE BASIN 1-LF: Brand: MEDLINE INDUSTRIES, INC.

## (undated) DEVICE — GLOVE ORANGE PI 7 1/2   MSG9075

## (undated) DEVICE — 3M™ STERI-DRAPE™ U-DRAPE 1015: Brand: STERI-DRAPE™

## (undated) DEVICE — PAD,ABDOMINAL,5"X9",ST,LF,25/BX: Brand: MEDLINE INDUSTRIES, INC.

## (undated) DEVICE — TOTAL TRAY, 16FR 10ML SIL FOLEY, URN: Brand: MEDLINE

## (undated) DEVICE — DRAPE THER FLUID WARMING 66X44 IN FLAT SLUSH DBL DISC ORS

## (undated) DEVICE — 4-PORT MANIFOLD: Brand: NEPTUNE 2

## (undated) DEVICE — RELOAD STPL L75MM OPN H3.8MM CLS 1.5MM WIRE DIA0.2MM REG

## (undated) DEVICE — DRESSING,GAUZE,XEROFORM,CURAD,1"X8",ST: Brand: CURAD

## (undated) DEVICE — GLOVE ORANGE PI 8   MSG9080

## (undated) DEVICE — SYRINGE MED 30ML STD CLR PLAS LUERLOCK TIP N CTRL DISP

## (undated) DEVICE — INTENDED FOR TISSUE SEPARATION, AND OTHER PROCEDURES THAT REQUIRE A SHARP SURGICAL BLADE TO PUNCTURE OR CUT.: Brand: BARD-PARKER ® STAINLESS STEEL BLADES

## (undated) DEVICE — SURGICAL PROCEDURE PACK BASIC

## (undated) DEVICE — PACK PROCEDURE SURG GEN CUST

## (undated) DEVICE — DILATORS EEA W/VALTRAC

## (undated) DEVICE — GOWN,SIRUS,FABRNF,XL,20/CS: Brand: MEDLINE

## (undated) DEVICE — ELECTRODE PT RET AD L9FT HI MOIST COND ADH HYDRGEL CORDED

## (undated) DEVICE — DRAPE C ARM W41XL74IN UNIV MOB W RUBBERBAND CLP

## (undated) DEVICE — GAUZE,SPONGE,4"X4",8PLY,STRL,LF,10/TRAY: Brand: MEDLINE

## (undated) DEVICE — BIT DRL L330MM DIA4.2MM CALIB L100MM ST 3 FLUT QUIK CPL FOR

## (undated) DEVICE — DOUBLE BASIN SET: Brand: MEDLINE INDUSTRIES, INC.